# Patient Record
Sex: MALE | Race: WHITE | NOT HISPANIC OR LATINO | Employment: OTHER
[De-identification: names, ages, dates, MRNs, and addresses within clinical notes are randomized per-mention and may not be internally consistent; named-entity substitution may affect disease eponyms.]

---

## 2022-02-15 ENCOUNTER — HOME CARE VISIT (OUTPATIENT)
Dept: HOME HEALTH SERVICES | Facility: CLINIC | Age: 87
End: 2022-02-15

## 2022-02-15 ENCOUNTER — HOME HEALTH ADMISSION (OUTPATIENT)
Dept: HOME HEALTH SERVICES | Facility: HOME HEALTHCARE | Age: 87
End: 2022-02-15

## 2022-02-15 ENCOUNTER — TRANSCRIBE ORDERS (OUTPATIENT)
Dept: HOME HEALTH SERVICES | Facility: HOME HEALTHCARE | Age: 87
End: 2022-02-15

## 2022-02-15 DIAGNOSIS — J18.9 UNRESOLVED PNEUMONIA: ICD-10-CM

## 2022-02-15 DIAGNOSIS — J44.1 COPD EXACERBATION: Primary | ICD-10-CM

## 2022-02-15 PROCEDURE — G0151 HHCP-SERV OF PT,EA 15 MIN: HCPCS

## 2022-02-16 ENCOUNTER — HOME CARE VISIT (OUTPATIENT)
Dept: HOME HEALTH SERVICES | Facility: CLINIC | Age: 87
End: 2022-02-16

## 2022-02-16 VITALS
OXYGEN SATURATION: 96 % | SYSTOLIC BLOOD PRESSURE: 129 MMHG | RESPIRATION RATE: 18 BRPM | HEART RATE: 70 BPM | TEMPERATURE: 97.9 F | DIASTOLIC BLOOD PRESSURE: 78 MMHG

## 2022-02-16 PROCEDURE — G0299 HHS/HOSPICE OF RN EA 15 MIN: HCPCS

## 2022-02-16 NOTE — HOME HEALTH
REASON FOR REFERRAL:  Patient admitted to [facility] 2/9/2022-2/14/2022 with hypoxic respiratory failure due to COPD exacerbation and pneumonia.  He reports he feels fine and isn't sure he needs any help.  Patient's caregiver reports patient has gotten weaker and had several recent falls.  DIAGNOSIS: Weakness, abnormal gait  SURGICAL PROCEDURE: N/A  PERTINENT MEDICAL HISTORY: HTN, HLD, Carotid artery occlusion, Dementia, Black lung  PRIOR LEVEL OF FUNCTION: Ambulate all surfaces I with PRN mackenzie of std cane or quad cane  PATIENT GOAL FOR THIS EPISODE OF CARE: Get around like I used to  HOME SOCIAL ENVIRONMENT: Lives in 2 story home with spouse but does not have to access lower level

## 2022-02-17 VITALS
OXYGEN SATURATION: 94 % | TEMPERATURE: 97.2 F | DIASTOLIC BLOOD PRESSURE: 78 MMHG | HEART RATE: 63 BPM | SYSTOLIC BLOOD PRESSURE: 142 MMHG

## 2022-02-19 ENCOUNTER — HOME CARE VISIT (OUTPATIENT)
Dept: HOME HEALTH SERVICES | Facility: CLINIC | Age: 87
End: 2022-02-19

## 2022-02-19 VITALS
RESPIRATION RATE: 18 BRPM | SYSTOLIC BLOOD PRESSURE: 126 MMHG | TEMPERATURE: 97.1 F | DIASTOLIC BLOOD PRESSURE: 80 MMHG | HEART RATE: 64 BPM | OXYGEN SATURATION: 95 %

## 2022-02-19 PROCEDURE — G0299 HHS/HOSPICE OF RN EA 15 MIN: HCPCS

## 2022-02-24 ENCOUNTER — HOME CARE VISIT (OUTPATIENT)
Dept: HOME HEALTH SERVICES | Facility: CLINIC | Age: 87
End: 2022-02-24

## 2022-02-24 VITALS
TEMPERATURE: 96.8 F | DIASTOLIC BLOOD PRESSURE: 86 MMHG | RESPIRATION RATE: 18 BRPM | HEART RATE: 66 BPM | SYSTOLIC BLOOD PRESSURE: 142 MMHG | OXYGEN SATURATION: 94 %

## 2022-02-24 PROCEDURE — G0157 HHC PT ASSISTANT EA 15: HCPCS

## 2022-02-25 NOTE — HOME HEALTH
pt up amb. in home and reports had scheduled appointment in [place] this am, but cancelled due to bad weather; pt reports rescheduled for 3/10/22; pt reports continuing activities as able to but mostly limited by poor vision, and wants to lay around a lot because he can't get out and do what he used to

## 2022-03-02 ENCOUNTER — HOME CARE VISIT (OUTPATIENT)
Dept: HOME HEALTH SERVICES | Facility: CLINIC | Age: 87
End: 2022-03-02

## 2022-03-02 VITALS
SYSTOLIC BLOOD PRESSURE: 118 MMHG | TEMPERATURE: 96.6 F | HEART RATE: 68 BPM | RESPIRATION RATE: 18 BRPM | DIASTOLIC BLOOD PRESSURE: 82 MMHG | OXYGEN SATURATION: 94 %

## 2022-03-02 PROCEDURE — G0157 HHC PT ASSISTANT EA 15: HCPCS

## 2022-03-03 NOTE — HOME HEALTH
pt up amb. in home and reports doing pretty good; pts spouse reports pt attempted to do some yard work yesturday but couldn't get the small chain saw started; pt reports still trying to get out and stay as active as possible

## 2022-03-10 ENCOUNTER — HOME CARE VISIT (OUTPATIENT)
Dept: HOME HEALTH SERVICES | Facility: CLINIC | Age: 87
End: 2022-03-10

## 2022-03-10 PROCEDURE — G0151 HHCP-SERV OF PT,EA 15 MIN: HCPCS

## 2022-03-11 VITALS
TEMPERATURE: 97.4 F | OXYGEN SATURATION: 96 % | DIASTOLIC BLOOD PRESSURE: 59 MMHG | SYSTOLIC BLOOD PRESSURE: 106 MMHG | HEART RATE: 73 BPM | RESPIRATION RATE: 18 BRPM

## 2022-03-11 NOTE — HOME HEALTH
Patient reports he doesn't depend on his cane but uses it outside.  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  Patient reports completing his exercises 1-2 times a day and is up to 25 reps of each exercise. He had a follow up with the doctor today with no medication changes reported by patient's spouse; patient reports he does not plan to f/u with the pulmonologist when scheduled.  Patient reports he is getting in and out of the tub on his own and is doing everything he used to do.

## 2022-10-02 ENCOUNTER — APPOINTMENT (OUTPATIENT)
Dept: CT IMAGING | Facility: HOSPITAL | Age: 87
End: 2022-10-02

## 2022-10-02 ENCOUNTER — APPOINTMENT (OUTPATIENT)
Dept: GENERAL RADIOLOGY | Facility: HOSPITAL | Age: 87
End: 2022-10-02

## 2022-10-02 ENCOUNTER — HOSPITAL ENCOUNTER (EMERGENCY)
Facility: HOSPITAL | Age: 87
Discharge: HOME | End: 2022-10-02
Attending: STUDENT IN AN ORGANIZED HEALTH CARE EDUCATION/TRAINING PROGRAM | Admitting: STUDENT IN AN ORGANIZED HEALTH CARE EDUCATION/TRAINING PROGRAM

## 2022-10-02 VITALS
WEIGHT: 135 LBS | BODY MASS INDEX: 22.49 KG/M2 | TEMPERATURE: 98.6 F | OXYGEN SATURATION: 95 % | SYSTOLIC BLOOD PRESSURE: 195 MMHG | HEIGHT: 65 IN | DIASTOLIC BLOOD PRESSURE: 88 MMHG | RESPIRATION RATE: 18 BRPM | HEART RATE: 84 BPM

## 2022-10-02 DIAGNOSIS — R41.0 CONFUSION: Primary | ICD-10-CM

## 2022-10-02 PROCEDURE — 71045 X-RAY EXAM CHEST 1 VIEW: CPT

## 2022-10-02 PROCEDURE — 99283 EMERGENCY DEPT VISIT LOW MDM: CPT

## 2022-10-02 PROCEDURE — 70450 CT HEAD/BRAIN W/O DYE: CPT

## 2022-10-02 NOTE — ED PROVIDER NOTES
Subjective   History of Present Illness  Patient presents with wife with complaints of 3 days of intermittent confusion after falling off his porch and hitting his forehead on Wednesday.  He is not sure if he lost consciousness.  Patient has dementia and is on donezepil.  He has had upper respiratory symptoms and [facility] for some relief but was informed that he needed to come here to get a CAT scan.  He did not want to come to the hospital yesterday but presented today with increased cough and more confusion.  Wife has given him DM cough medicine over-the-counter and states that seems to make him more confused.        Review of Systems   Constitutional: Negative for activity change and appetite change.   HENT: Negative for congestion and ear pain.    Eyes: Negative for pain and discharge.   Respiratory: Positive for cough. Negative for chest tightness and shortness of breath.    Cardiovascular: Negative for chest pain and palpitations.   Gastrointestinal: Negative for abdominal distention and abdominal pain.   Endocrine: Negative for cold intolerance and heat intolerance.   Genitourinary: Negative for difficulty urinating and dysuria.   Musculoskeletal: Negative for arthralgias and back pain.   Skin: Negative for color change and rash.   Allergic/Immunologic: Negative for environmental allergies and food allergies.   Neurological: Negative for dizziness and headaches.   Hematological: Negative for adenopathy. Does not bruise/bleed easily.   Psychiatric/Behavioral: Positive for confusion. Negative for agitation.       No past medical history on file.    Allergies   Allergen Reactions   • Morphine Unknown - High Severity   • Tramadol Unknown - High Severity       No past surgical history on file.    No family history on file.    Social History     Socioeconomic History   • Marital status:            Objective   Physical Exam  Vitals and nursing note reviewed.   Constitutional:        "Appearance: He is well-developed.   HENT:      Head: Normocephalic and atraumatic.   Eyes:      Pupils: Pupils are equal, round, and reactive to light.   Neck:      Thyroid: No thyromegaly.      Trachea: No tracheal deviation.   Cardiovascular:      Rate and Rhythm: Normal rate.      Pulses:           Radial pulses are 2+ on the left side.        Dorsalis pedis pulses are 2+ on the right side and 2+ on the left side.      Heart sounds: Normal heart sounds, S1 normal and S2 normal.   Pulmonary:      Effort: Pulmonary effort is normal.      Breath sounds: Normal breath sounds.   Abdominal:      Palpations: Abdomen is soft.   Musculoskeletal:         General: Normal range of motion.      Cervical back: Neck supple.   Skin:     General: Skin is warm and dry.      Capillary Refill: Capillary refill takes 2 to 3 seconds.   Neurological:      Mental Status: He is alert and oriented to person, place, and time.      GCS: GCS eye subscore is 4. GCS verbal subscore is 4. GCS motor subscore is 6.      Comments: Patient has a history of dementia and is not always oriented.  Wife endorses baseline mentation at time of exam.     Psychiatric:         Speech: Speech normal.         Behavior: Behavior normal.         Thought Content: Thought content normal.         Procedures           ED Course  ED Course as of 10/02/22 2213   Sun Oct 02, 2022   1345 Patient is refusing blood draw or saline lock. [TERESITA]      ED Course User Index  [TERESITA] Rocío Gary MD           Vitals:    10/02/22 1233 10/02/22 1341 10/02/22 1504   BP: 146/83 (!) 189/89 (!) 195/88   BP Location: Left arm Left arm Left arm   Patient Position: Sitting Sitting Sitting   Pulse: 95 81 84   Resp: 17 18 18   Temp: 98.6 °F (37 °C)     TempSrc: Oral     SpO2: 93% 93% 95%   Weight: 61.2 kg (135 lb)     Height: 165.1 cm (65\")       Lab Results (last 24 hours)     ** No results found for the last 24 hours. **        CT Head Without Contrast    Result Date: " 10/2/2022  CONCLUSION: No intracranial injury or acute process. Cerebral atrophy. Marked small vessel disease. [id number] Electronically signed by:  Daniel Ruelas MD  10/2/2022 1:37 PM CDT Workstation: [id number]    XR Chest 1 View    Result Date: 10/2/2022  CONCLUSION: Previously demonstrated large right-sided diaphragmatic hernia. 10 cm hiatal hernia. [id number] Electronically signed by:  Daniel Ruelas MD  10/2/2022 2:05 PM CDT Workstation: [id number]                                    MDM  Number of Diagnoses or Management Options  Confusion  Diagnosis management comments: Patient with increased confusion after being given cold medicine.  Patient refused labs or urine.  CT head negative for acute findings.  Discussed with wife other options for controlling his upper respiratory infection symptoms and alternatives to coughing such as honey, increase fluids, or plain guaifenesin.  Close follow-up with PCP tomorrow.  Vital signs were stable throughout stay.       Amount and/or Complexity of Data Reviewed  Tests in the radiology section of CPT®: ordered and reviewed    Patient Progress  Patient progress: stable      Final diagnoses:   Confusion       ED Disposition  ED Disposition     ED Disposition   Discharge    Condition   Stable    Comment   --             Janina Motley, APRN  [address] [id number] [phone]    Call in 1 day  for follow up         Medication List      No changes were made to your prescriptions during this visit.       This document has been electronically signed by Rocío Gary MD on October 2, 2022 22:15 CDT    Rocío Gary MD   Part of this note may be an electronic transcription/translation of spoken language to printed text using the Dragon Dictation System.        Rocío Gary MD  10/02/22 2215       Rocío Gary MD  10/02/22 2216

## 2022-10-02 NOTE — DISCHARGE INSTRUCTIONS
Call your PCP tomorrow for follow-up.  Take all your regularly scheduled medications.  Do not use over-the-counter decongestants and use only nasal saline washes, plain guaifenesin for cough, honey for cough and sore throat, and increase fluids.

## 2022-10-09 ENCOUNTER — HOSPITAL ENCOUNTER (INPATIENT)
Facility: HOSPITAL | Age: 87
LOS: 10 days | Discharge: HOME | End: 2022-10-19
Attending: PSYCHIATRY & NEUROLOGY | Admitting: PSYCHIATRY & NEUROLOGY

## 2022-10-09 ENCOUNTER — APPOINTMENT (OUTPATIENT)
Dept: CT IMAGING | Facility: HOSPITAL | Age: 87
End: 2022-10-09

## 2022-10-09 ENCOUNTER — HOSPITAL ENCOUNTER (EMERGENCY)
Facility: HOSPITAL | Age: 87
Discharge: PSYCHIATRIC OR SUBSTANCE USE FACILITY | End: 2022-10-09
Attending: EMERGENCY MEDICINE | Admitting: EMERGENCY MEDICINE

## 2022-10-09 VITALS
OXYGEN SATURATION: 96 % | HEART RATE: 78 BPM | HEIGHT: 65 IN | WEIGHT: 135 LBS | SYSTOLIC BLOOD PRESSURE: 180 MMHG | RESPIRATION RATE: 18 BRPM | TEMPERATURE: 98.1 F | BODY MASS INDEX: 22.49 KG/M2 | DIASTOLIC BLOOD PRESSURE: 92 MMHG

## 2022-10-09 DIAGNOSIS — Z74.09 IMPAIRED MOBILITY AND ADLS: ICD-10-CM

## 2022-10-09 DIAGNOSIS — R44.3 HALLUCINATION: Primary | ICD-10-CM

## 2022-10-09 DIAGNOSIS — F03.918 DEMENTIA WITH BEHAVIORAL DISTURBANCE: ICD-10-CM

## 2022-10-09 DIAGNOSIS — R26.89 DECREASED FUNCTIONAL MOBILITY: ICD-10-CM

## 2022-10-09 DIAGNOSIS — Z78.9 IMPAIRED MOBILITY AND ADLS: ICD-10-CM

## 2022-10-09 PROBLEM — F03.90 DEMENTIA: Status: ACTIVE | Noted: 2022-01-01

## 2022-10-09 LAB
ALBUMIN SERPL-MCNC: 4.2 G/DL (ref 3.5–5.2)
ALBUMIN/GLOB SERPL: 1.4 G/DL
ALP SERPL-CCNC: 136 U/L (ref 39–117)
ALT SERPL W P-5'-P-CCNC: 10 U/L (ref 1–41)
AMPHET+METHAMPHET UR QL: NEGATIVE
AMPHETAMINES UR QL: NEGATIVE
ANION GAP SERPL CALCULATED.3IONS-SCNC: 13 MMOL/L (ref 5–15)
APAP SERPL-MCNC: <5 MCG/ML (ref 0–30)
AST SERPL-CCNC: 15 U/L (ref 1–40)
BARBITURATES UR QL SCN: NEGATIVE
BASOPHILS # BLD AUTO: 0.05 10*3/MM3 (ref 0–0.2)
BASOPHILS NFR BLD AUTO: 0.5 % (ref 0–1.5)
BENZODIAZ UR QL SCN: NEGATIVE
BILIRUB SERPL-MCNC: 0.5 MG/DL (ref 0–1.2)
BILIRUB UR QL STRIP: NEGATIVE
BUN SERPL-MCNC: 16 MG/DL (ref 8–23)
BUN/CREAT SERPL: 13.3 (ref 7–25)
BUPRENORPHINE SERPL-MCNC: NEGATIVE NG/ML
CALCIUM SPEC-SCNC: 9.6 MG/DL (ref 8.6–10.5)
CANNABINOIDS SERPL QL: NEGATIVE
CHLORIDE SERPL-SCNC: 107 MMOL/L (ref 98–107)
CLARITY UR: CLEAR
CO2 SERPL-SCNC: 22 MMOL/L (ref 22–29)
COCAINE UR QL: NEGATIVE
COLOR UR: YELLOW
CREAT SERPL-MCNC: 1.2 MG/DL (ref 0.76–1.27)
DEPRECATED RDW RBC AUTO: 42.6 FL (ref 37–54)
EGFRCR SERPLBLD CKD-EPI 2021: 58.9 ML/MIN/1.73
EOSINOPHIL # BLD AUTO: 0.42 10*3/MM3 (ref 0–0.4)
EOSINOPHIL NFR BLD AUTO: 4.4 % (ref 0.3–6.2)
ERYTHROCYTE [DISTWIDTH] IN BLOOD BY AUTOMATED COUNT: 12.3 % (ref 12.3–15.4)
ETHANOL BLD-MCNC: <10 MG/DL (ref 0–10)
ETHANOL UR QL: <0.01 %
FLUAV RNA RESP QL NAA+PROBE: NOT DETECTED
FLUBV RNA RESP QL NAA+PROBE: NOT DETECTED
GLOBULIN UR ELPH-MCNC: 2.9 GM/DL
GLUCOSE SERPL-MCNC: 108 MG/DL (ref 65–99)
GLUCOSE UR STRIP-MCNC: NEGATIVE MG/DL
HCT VFR BLD AUTO: 45.1 % (ref 37.5–51)
HGB BLD-MCNC: 15.7 G/DL (ref 13–17.7)
HGB UR QL STRIP.AUTO: NEGATIVE
HOLD SPECIMEN: NORMAL
IMM GRANULOCYTES # BLD AUTO: 0.07 10*3/MM3 (ref 0–0.05)
IMM GRANULOCYTES NFR BLD AUTO: 0.7 % (ref 0–0.5)
KETONES UR QL STRIP: NEGATIVE
LEUKOCYTE ESTERASE UR QL STRIP.AUTO: NEGATIVE
LYMPHOCYTES # BLD AUTO: 2.08 10*3/MM3 (ref 0.7–3.1)
LYMPHOCYTES NFR BLD AUTO: 21.8 % (ref 19.6–45.3)
MCH RBC QN AUTO: 32.8 PG (ref 26.6–33)
MCHC RBC AUTO-ENTMCNC: 34.8 G/DL (ref 31.5–35.7)
MCV RBC AUTO: 94.2 FL (ref 79–97)
METHADONE UR QL SCN: NEGATIVE
MONOCYTES # BLD AUTO: 0.73 10*3/MM3 (ref 0.1–0.9)
MONOCYTES NFR BLD AUTO: 7.7 % (ref 5–12)
NEUTROPHILS NFR BLD AUTO: 6.18 10*3/MM3 (ref 1.7–7)
NEUTROPHILS NFR BLD AUTO: 64.9 % (ref 42.7–76)
NITRITE UR QL STRIP: NEGATIVE
NRBC BLD AUTO-RTO: 0 /100 WBC (ref 0–0.2)
OPIATES UR QL: NEGATIVE
OXYCODONE UR QL SCN: NEGATIVE
PCP UR QL SCN: NEGATIVE
PH UR STRIP.AUTO: 6 [PH] (ref 5–9)
PLATELET # BLD AUTO: 222 10*3/MM3 (ref 140–450)
PMV BLD AUTO: 9.7 FL (ref 6–12)
POTASSIUM SERPL-SCNC: 3.7 MMOL/L (ref 3.5–5.2)
PROPOXYPH UR QL: NEGATIVE
PROT SERPL-MCNC: 7.1 G/DL (ref 6–8.5)
PROT UR QL STRIP: ABNORMAL
RBC # BLD AUTO: 4.79 10*6/MM3 (ref 4.14–5.8)
SALICYLATES SERPL-MCNC: <0.3 MG/DL
SARS-COV-2 RNA RESP QL NAA+PROBE: NOT DETECTED
SODIUM SERPL-SCNC: 142 MMOL/L (ref 136–145)
SP GR UR STRIP: 1.02 (ref 1–1.03)
TRICYCLICS UR QL SCN: NEGATIVE
URATE SERPL-MCNC: 6.3 MG/DL (ref 3.4–7)
UROBILINOGEN UR QL STRIP: ABNORMAL
WBC NRBC COR # BLD: 9.53 10*3/MM3 (ref 3.4–10.8)
WHOLE BLOOD HOLD COAG: NORMAL

## 2022-10-09 PROCEDURE — 80053 COMPREHEN METABOLIC PANEL: CPT | Performed by: EMERGENCY MEDICINE

## 2022-10-09 PROCEDURE — 36415 COLL VENOUS BLD VENIPUNCTURE: CPT

## 2022-10-09 PROCEDURE — 72125 CT NECK SPINE W/O DYE: CPT

## 2022-10-09 PROCEDURE — 99284 EMERGENCY DEPT VISIT MOD MDM: CPT

## 2022-10-09 PROCEDURE — 87636 SARSCOV2 & INF A&B AMP PRB: CPT | Performed by: EMERGENCY MEDICINE

## 2022-10-09 PROCEDURE — 85025 COMPLETE CBC W/AUTO DIFF WBC: CPT | Performed by: EMERGENCY MEDICINE

## 2022-10-09 PROCEDURE — 82077 ASSAY SPEC XCP UR&BREATH IA: CPT | Performed by: EMERGENCY MEDICINE

## 2022-10-09 PROCEDURE — 80143 DRUG ASSAY ACETAMINOPHEN: CPT | Performed by: EMERGENCY MEDICINE

## 2022-10-09 PROCEDURE — 80306 DRUG TEST PRSMV INSTRMNT: CPT | Performed by: EMERGENCY MEDICINE

## 2022-10-09 PROCEDURE — 84550 ASSAY OF BLOOD/URIC ACID: CPT | Performed by: EMERGENCY MEDICINE

## 2022-10-09 PROCEDURE — 70450 CT HEAD/BRAIN W/O DYE: CPT

## 2022-10-09 PROCEDURE — 99285 EMERGENCY DEPT VISIT HI MDM: CPT

## 2022-10-09 PROCEDURE — 80179 DRUG ASSAY SALICYLATE: CPT | Performed by: EMERGENCY MEDICINE

## 2022-10-09 PROCEDURE — 81003 URINALYSIS AUTO W/O SCOPE: CPT | Performed by: EMERGENCY MEDICINE

## 2022-10-09 RX ORDER — CLONIDINE HYDROCHLORIDE 0.1 MG/1
0.1 TABLET ORAL 4 TIMES DAILY PRN
Status: DISCONTINUED | OUTPATIENT
Start: 2022-10-09 | End: 2022-10-19 | Stop reason: HOSPADM

## 2022-10-09 RX ORDER — CLONIDINE HYDROCHLORIDE 0.1 MG/1
0.1 TABLET ORAL 4 TIMES DAILY PRN
Status: DISCONTINUED | OUTPATIENT
Start: 2022-10-09 | End: 2022-10-09

## 2022-10-09 RX ORDER — ACETAMINOPHEN 325 MG/1
650 TABLET ORAL EVERY 4 HOURS PRN
Status: DISCONTINUED | OUTPATIENT
Start: 2022-10-09 | End: 2022-10-19 | Stop reason: HOSPADM

## 2022-10-09 RX ORDER — LOPERAMIDE HYDROCHLORIDE 2 MG/1
2 CAPSULE ORAL
Status: DISCONTINUED | OUTPATIENT
Start: 2022-10-09 | End: 2022-10-19 | Stop reason: HOSPADM

## 2022-10-09 RX ORDER — ALUMINA, MAGNESIA, AND SIMETHICONE 2400; 2400; 240 MG/30ML; MG/30ML; MG/30ML
15 SUSPENSION ORAL EVERY 6 HOURS PRN
Status: DISCONTINUED | OUTPATIENT
Start: 2022-10-09 | End: 2022-10-19 | Stop reason: HOSPADM

## 2022-10-10 PROBLEM — F02.80 MAJOR NEUROCOGNITIVE DISORDER DUE TO MULTIPLE ETIOLOGIES: Status: ACTIVE | Noted: 2022-10-10

## 2022-10-10 PROBLEM — E78.5 HLD (HYPERLIPIDEMIA): Status: ACTIVE | Noted: 2022-01-01

## 2022-10-10 PROBLEM — I67.9 CEREBROVASCULAR DISEASE: Status: ACTIVE | Noted: 2022-10-10

## 2022-10-10 PROBLEM — I10 BENIGN ESSENTIAL HTN: Status: ACTIVE | Noted: 2022-10-10

## 2022-10-10 PROBLEM — I25.10 CAD (CORONARY ARTERY DISEASE): Status: ACTIVE | Noted: 2022-10-10

## 2022-10-10 PROBLEM — J44.9 COPD (CHRONIC OBSTRUCTIVE PULMONARY DISEASE): Status: ACTIVE | Noted: 2022-10-10

## 2022-10-10 PROCEDURE — 97162 PT EVAL MOD COMPLEX 30 MIN: CPT

## 2022-10-10 PROCEDURE — 94640 AIRWAY INHALATION TREATMENT: CPT

## 2022-10-10 PROCEDURE — 97165 OT EVAL LOW COMPLEX 30 MIN: CPT

## 2022-10-10 PROCEDURE — 94760 N-INVAS EAR/PLS OXIMETRY 1: CPT

## 2022-10-10 PROCEDURE — 99223 1ST HOSP IP/OBS HIGH 75: CPT | Performed by: PSYCHIATRY & NEUROLOGY

## 2022-10-10 RX ORDER — TORSEMIDE 10 MG/1
10 TABLET ORAL DAILY
Status: DISCONTINUED | OUTPATIENT
Start: 2022-10-10 | End: 2022-10-19 | Stop reason: HOSPADM

## 2022-10-10 RX ORDER — ATORVASTATIN CALCIUM 40 MG/1
40 TABLET, FILM COATED ORAL NIGHTLY
Status: DISCONTINUED | OUTPATIENT
Start: 2022-10-10 | End: 2022-10-19 | Stop reason: HOSPADM

## 2022-10-10 RX ORDER — MULTIPLE VITAMINS W/ MINERALS TAB 9MG-400MCG
1 TAB ORAL DAILY
Status: DISCONTINUED | OUTPATIENT
Start: 2022-10-10 | End: 2022-10-19 | Stop reason: HOSPADM

## 2022-10-10 RX ORDER — NITROGLYCERIN 0.4 MG/1
0.4 TABLET SUBLINGUAL
Status: DISCONTINUED | OUTPATIENT
Start: 2022-10-10 | End: 2022-10-19 | Stop reason: HOSPADM

## 2022-10-10 RX ORDER — RIVASTIGMINE 4.6 MG/24H
1 PATCH, EXTENDED RELEASE TRANSDERMAL DAILY
Status: DISCONTINUED | OUTPATIENT
Start: 2022-10-10 | End: 2022-10-19 | Stop reason: HOSPADM

## 2022-10-10 RX ORDER — ASPIRIN 81 MG/1
81 TABLET, CHEWABLE ORAL DAILY
Status: DISCONTINUED | OUTPATIENT
Start: 2022-10-10 | End: 2022-10-19 | Stop reason: HOSPADM

## 2022-10-10 RX ORDER — BUDESONIDE AND FORMOTEROL FUMARATE DIHYDRATE 160; 4.5 UG/1; UG/1
2 AEROSOL RESPIRATORY (INHALATION)
Status: DISCONTINUED | OUTPATIENT
Start: 2022-10-10 | End: 2022-10-19 | Stop reason: HOSPADM

## 2022-10-10 RX ORDER — ALBUTEROL SULFATE 2.5 MG/3ML
2.5 SOLUTION RESPIRATORY (INHALATION) EVERY 6 HOURS PRN
Status: DISCONTINUED | OUTPATIENT
Start: 2022-10-10 | End: 2022-10-19 | Stop reason: HOSPADM

## 2022-10-10 RX ORDER — SPIRONOLACTONE 25 MG/1
25 TABLET ORAL DAILY
Status: DISCONTINUED | OUTPATIENT
Start: 2022-10-10 | End: 2022-10-19 | Stop reason: HOSPADM

## 2022-10-10 RX ADMIN — ASPIRIN 81 MG: 81 TABLET, CHEWABLE ORAL at 08:53

## 2022-10-10 RX ADMIN — BUDESONIDE AND FORMOTEROL FUMARATE DIHYDRATE 2 PUFF: 160; 4.5 AEROSOL RESPIRATORY (INHALATION) at 10:29

## 2022-10-10 RX ADMIN — Medication 1 TABLET: at 08:53

## 2022-10-10 RX ADMIN — TORSEMIDE 10 MG: 10 TABLET ORAL at 08:53

## 2022-10-10 RX ADMIN — ATORVASTATIN CALCIUM 40 MG: 40 TABLET, FILM COATED ORAL at 20:27

## 2022-10-10 RX ADMIN — SPIRONOLACTONE 25 MG: 25 TABLET ORAL at 08:53

## 2022-10-10 RX ADMIN — RIVASTIGMINE TRANSDERMAL SYSTEM 1 PATCH: 4.6 PATCH, EXTENDED RELEASE TRANSDERMAL at 17:23

## 2022-10-11 LAB
25(OH)D3 SERPL-MCNC: 37 NG/ML (ref 30–100)
FOLATE SERPL-MCNC: 6.59 NG/ML (ref 4.78–24.2)
HIV1+2 AB SER QL: NORMAL
QT INTERVAL: 434 MS
QTC INTERVAL: 519 MS
TSH SERPL DL<=0.05 MIU/L-ACNC: 2.4 UIU/ML (ref 0.27–4.2)
VIT B12 BLD-MCNC: 522 PG/ML (ref 211–946)

## 2022-10-11 PROCEDURE — 84443 ASSAY THYROID STIM HORMONE: CPT | Performed by: PSYCHIATRY & NEUROLOGY

## 2022-10-11 PROCEDURE — 94799 UNLISTED PULMONARY SVC/PX: CPT

## 2022-10-11 PROCEDURE — 82607 VITAMIN B-12: CPT | Performed by: PSYCHIATRY & NEUROLOGY

## 2022-10-11 PROCEDURE — 99232 SBSQ HOSP IP/OBS MODERATE 35: CPT | Performed by: PSYCHIATRY & NEUROLOGY

## 2022-10-11 PROCEDURE — 82306 VITAMIN D 25 HYDROXY: CPT | Performed by: PSYCHIATRY & NEUROLOGY

## 2022-10-11 PROCEDURE — 82746 ASSAY OF FOLIC ACID SERUM: CPT | Performed by: PSYCHIATRY & NEUROLOGY

## 2022-10-11 PROCEDURE — G0432 EIA HIV-1/HIV-2 SCREEN: HCPCS | Performed by: PSYCHIATRY & NEUROLOGY

## 2022-10-11 PROCEDURE — 94664 DEMO&/EVAL PT USE INHALER: CPT

## 2022-10-11 PROCEDURE — 94760 N-INVAS EAR/PLS OXIMETRY 1: CPT

## 2022-10-11 PROCEDURE — 93005 ELECTROCARDIOGRAM TRACING: CPT | Performed by: PSYCHIATRY & NEUROLOGY

## 2022-10-11 RX ADMIN — Medication 1 TABLET: at 08:58

## 2022-10-11 RX ADMIN — SPIRONOLACTONE 25 MG: 25 TABLET ORAL at 08:58

## 2022-10-11 RX ADMIN — TORSEMIDE 10 MG: 10 TABLET ORAL at 08:58

## 2022-10-11 RX ADMIN — RIVASTIGMINE TRANSDERMAL SYSTEM 1 PATCH: 4.6 PATCH, EXTENDED RELEASE TRANSDERMAL at 08:59

## 2022-10-11 RX ADMIN — ATORVASTATIN CALCIUM 40 MG: 40 TABLET, FILM COATED ORAL at 20:03

## 2022-10-11 RX ADMIN — BUDESONIDE AND FORMOTEROL FUMARATE DIHYDRATE 2 PUFF: 160; 4.5 AEROSOL RESPIRATORY (INHALATION) at 20:14

## 2022-10-11 RX ADMIN — BUDESONIDE AND FORMOTEROL FUMARATE DIHYDRATE 2 PUFF: 160; 4.5 AEROSOL RESPIRATORY (INHALATION) at 08:48

## 2022-10-11 RX ADMIN — ASPIRIN 81 MG: 81 TABLET, CHEWABLE ORAL at 08:58

## 2022-10-12 PROCEDURE — 99232 SBSQ HOSP IP/OBS MODERATE 35: CPT | Performed by: PSYCHIATRY & NEUROLOGY

## 2022-10-12 PROCEDURE — 94799 UNLISTED PULMONARY SVC/PX: CPT

## 2022-10-12 RX ORDER — ESCITALOPRAM OXALATE 5 MG/1
2.5 TABLET ORAL DAILY
Status: DISCONTINUED | OUTPATIENT
Start: 2022-10-12 | End: 2022-10-13

## 2022-10-12 RX ADMIN — ESCITALOPRAM 2.5 MG: 5 TABLET, FILM COATED ORAL at 15:41

## 2022-10-12 RX ADMIN — ATORVASTATIN CALCIUM 40 MG: 40 TABLET, FILM COATED ORAL at 20:17

## 2022-10-12 RX ADMIN — SPIRONOLACTONE 25 MG: 25 TABLET ORAL at 08:14

## 2022-10-12 RX ADMIN — Medication 1 TABLET: at 08:16

## 2022-10-12 RX ADMIN — TORSEMIDE 10 MG: 10 TABLET ORAL at 08:14

## 2022-10-12 RX ADMIN — BUDESONIDE AND FORMOTEROL FUMARATE DIHYDRATE 2 PUFF: 160; 4.5 AEROSOL RESPIRATORY (INHALATION) at 07:30

## 2022-10-12 RX ADMIN — RIVASTIGMINE TRANSDERMAL SYSTEM 1 PATCH: 4.6 PATCH, EXTENDED RELEASE TRANSDERMAL at 08:15

## 2022-10-12 RX ADMIN — ASPIRIN 81 MG: 81 TABLET, CHEWABLE ORAL at 08:14

## 2022-10-13 PROCEDURE — 99231 SBSQ HOSP IP/OBS SF/LOW 25: CPT | Performed by: PSYCHIATRY & NEUROLOGY

## 2022-10-13 PROCEDURE — 94799 UNLISTED PULMONARY SVC/PX: CPT

## 2022-10-13 RX ORDER — ESCITALOPRAM OXALATE 5 MG/1
5 TABLET ORAL DAILY
Status: DISCONTINUED | OUTPATIENT
Start: 2022-10-14 | End: 2022-10-19 | Stop reason: HOSPADM

## 2022-10-13 RX ADMIN — ASPIRIN 81 MG: 81 TABLET, CHEWABLE ORAL at 08:41

## 2022-10-13 RX ADMIN — ATORVASTATIN CALCIUM 40 MG: 40 TABLET, FILM COATED ORAL at 20:12

## 2022-10-13 RX ADMIN — Medication 1 TABLET: at 08:41

## 2022-10-13 RX ADMIN — RIVASTIGMINE TRANSDERMAL SYSTEM 1 PATCH: 4.6 PATCH, EXTENDED RELEASE TRANSDERMAL at 08:41

## 2022-10-13 RX ADMIN — BUDESONIDE AND FORMOTEROL FUMARATE DIHYDRATE 2 PUFF: 160; 4.5 AEROSOL RESPIRATORY (INHALATION) at 07:59

## 2022-10-13 RX ADMIN — TORSEMIDE 10 MG: 10 TABLET ORAL at 08:41

## 2022-10-13 RX ADMIN — BUDESONIDE AND FORMOTEROL FUMARATE DIHYDRATE 2 PUFF: 160; 4.5 AEROSOL RESPIRATORY (INHALATION) at 20:22

## 2022-10-13 RX ADMIN — SPIRONOLACTONE 25 MG: 25 TABLET ORAL at 08:41

## 2022-10-13 RX ADMIN — ESCITALOPRAM 2.5 MG: 5 TABLET, FILM COATED ORAL at 08:41

## 2022-10-14 PROCEDURE — 99231 SBSQ HOSP IP/OBS SF/LOW 25: CPT | Performed by: NURSE PRACTITIONER

## 2022-10-14 RX ADMIN — RIVASTIGMINE TRANSDERMAL SYSTEM 1 PATCH: 4.6 PATCH, EXTENDED RELEASE TRANSDERMAL at 08:53

## 2022-10-14 RX ADMIN — ASPIRIN 81 MG: 81 TABLET, CHEWABLE ORAL at 08:53

## 2022-10-14 RX ADMIN — TORSEMIDE 10 MG: 10 TABLET ORAL at 08:53

## 2022-10-14 RX ADMIN — SPIRONOLACTONE 25 MG: 25 TABLET ORAL at 08:53

## 2022-10-14 RX ADMIN — Medication 1 TABLET: at 08:53

## 2022-10-14 RX ADMIN — ATORVASTATIN CALCIUM 40 MG: 40 TABLET, FILM COATED ORAL at 21:03

## 2022-10-14 RX ADMIN — ESCITALOPRAM 5 MG: 5 TABLET, FILM COATED ORAL at 08:53

## 2022-10-15 PROCEDURE — 99232 SBSQ HOSP IP/OBS MODERATE 35: CPT | Performed by: PSYCHIATRY & NEUROLOGY

## 2022-10-15 RX ADMIN — TORSEMIDE 10 MG: 10 TABLET ORAL at 08:37

## 2022-10-15 RX ADMIN — ESCITALOPRAM 5 MG: 5 TABLET, FILM COATED ORAL at 08:37

## 2022-10-15 RX ADMIN — Medication 1 TABLET: at 08:37

## 2022-10-15 RX ADMIN — ATORVASTATIN CALCIUM 40 MG: 40 TABLET, FILM COATED ORAL at 21:26

## 2022-10-15 RX ADMIN — SPIRONOLACTONE 25 MG: 25 TABLET ORAL at 08:37

## 2022-10-15 RX ADMIN — RIVASTIGMINE TRANSDERMAL SYSTEM 1 PATCH: 4.6 PATCH, EXTENDED RELEASE TRANSDERMAL at 08:37

## 2022-10-15 RX ADMIN — ASPIRIN 81 MG: 81 TABLET, CHEWABLE ORAL at 08:37

## 2022-10-16 PROCEDURE — 94799 UNLISTED PULMONARY SVC/PX: CPT

## 2022-10-16 PROCEDURE — 94664 DEMO&/EVAL PT USE INHALER: CPT

## 2022-10-16 PROCEDURE — 94760 N-INVAS EAR/PLS OXIMETRY 1: CPT

## 2022-10-16 PROCEDURE — 99232 SBSQ HOSP IP/OBS MODERATE 35: CPT | Performed by: PSYCHIATRY & NEUROLOGY

## 2022-10-16 RX ADMIN — ATORVASTATIN CALCIUM 40 MG: 40 TABLET, FILM COATED ORAL at 21:49

## 2022-10-16 RX ADMIN — SPIRONOLACTONE 25 MG: 25 TABLET ORAL at 08:35

## 2022-10-16 RX ADMIN — ESCITALOPRAM 5 MG: 5 TABLET, FILM COATED ORAL at 08:35

## 2022-10-16 RX ADMIN — TORSEMIDE 10 MG: 10 TABLET ORAL at 08:35

## 2022-10-16 RX ADMIN — Medication 1 TABLET: at 08:35

## 2022-10-16 RX ADMIN — ASPIRIN 81 MG: 81 TABLET, CHEWABLE ORAL at 08:35

## 2022-10-16 RX ADMIN — BUDESONIDE AND FORMOTEROL FUMARATE DIHYDRATE 2 PUFF: 160; 4.5 AEROSOL RESPIRATORY (INHALATION) at 07:45

## 2022-10-16 RX ADMIN — RIVASTIGMINE TRANSDERMAL SYSTEM 1 PATCH: 4.6 PATCH, EXTENDED RELEASE TRANSDERMAL at 08:35

## 2022-10-17 PROCEDURE — 99232 SBSQ HOSP IP/OBS MODERATE 35: CPT | Performed by: PSYCHIATRY & NEUROLOGY

## 2022-10-17 RX ADMIN — ASPIRIN 81 MG: 81 TABLET, CHEWABLE ORAL at 08:26

## 2022-10-17 RX ADMIN — TORSEMIDE 10 MG: 10 TABLET ORAL at 08:27

## 2022-10-17 RX ADMIN — SPIRONOLACTONE 25 MG: 25 TABLET ORAL at 08:27

## 2022-10-17 RX ADMIN — RIVASTIGMINE TRANSDERMAL SYSTEM 1 PATCH: 4.6 PATCH, EXTENDED RELEASE TRANSDERMAL at 08:29

## 2022-10-17 RX ADMIN — ATORVASTATIN CALCIUM 40 MG: 40 TABLET, FILM COATED ORAL at 21:05

## 2022-10-17 RX ADMIN — Medication 1 TABLET: at 08:26

## 2022-10-17 RX ADMIN — ESCITALOPRAM 5 MG: 5 TABLET, FILM COATED ORAL at 08:26

## 2022-10-18 PROCEDURE — 99232 SBSQ HOSP IP/OBS MODERATE 35: CPT | Performed by: PSYCHIATRY & NEUROLOGY

## 2022-10-18 RX ADMIN — SPIRONOLACTONE 25 MG: 25 TABLET ORAL at 08:25

## 2022-10-18 RX ADMIN — ASPIRIN 81 MG: 81 TABLET, CHEWABLE ORAL at 08:25

## 2022-10-18 RX ADMIN — Medication 1 TABLET: at 08:25

## 2022-10-18 RX ADMIN — ESCITALOPRAM 5 MG: 5 TABLET, FILM COATED ORAL at 08:25

## 2022-10-18 RX ADMIN — TORSEMIDE 10 MG: 10 TABLET ORAL at 08:25

## 2022-10-18 RX ADMIN — RIVASTIGMINE TRANSDERMAL SYSTEM 1 PATCH: 4.6 PATCH, EXTENDED RELEASE TRANSDERMAL at 08:25

## 2022-10-18 RX ADMIN — ATORVASTATIN CALCIUM 40 MG: 40 TABLET, FILM COATED ORAL at 20:16

## 2022-10-19 VITALS
TEMPERATURE: 96.3 F | HEART RATE: 82 BPM | BODY MASS INDEX: 22.49 KG/M2 | DIASTOLIC BLOOD PRESSURE: 57 MMHG | WEIGHT: 135 LBS | HEIGHT: 65 IN | SYSTOLIC BLOOD PRESSURE: 98 MMHG | RESPIRATION RATE: 16 BRPM | OXYGEN SATURATION: 97 %

## 2022-10-19 PROCEDURE — 99238 HOSP IP/OBS DSCHRG MGMT 30/<: CPT | Performed by: PSYCHIATRY & NEUROLOGY

## 2022-10-19 PROCEDURE — 90846 FAMILY PSYTX W/O PT 50 MIN: CPT | Performed by: PSYCHIATRY & NEUROLOGY

## 2022-10-19 RX ORDER — RIVASTIGMINE 4.6 MG/24H
1 PATCH, EXTENDED RELEASE TRANSDERMAL DAILY
Qty: 30 PATCH | Refills: 1 | Status: SHIPPED | OUTPATIENT
Start: 2022-10-20

## 2022-10-19 RX ORDER — ESCITALOPRAM OXALATE 5 MG/1
5 TABLET ORAL DAILY
Qty: 30 TABLET | Refills: 1 | Status: SHIPPED | OUTPATIENT
Start: 2022-10-20

## 2022-10-19 RX ADMIN — TORSEMIDE 10 MG: 10 TABLET ORAL at 08:11

## 2022-10-19 RX ADMIN — RIVASTIGMINE TRANSDERMAL SYSTEM 1 PATCH: 4.6 PATCH, EXTENDED RELEASE TRANSDERMAL at 08:10

## 2022-10-19 RX ADMIN — ASPIRIN 81 MG: 81 TABLET, CHEWABLE ORAL at 08:11

## 2022-10-19 RX ADMIN — Medication 1 TABLET: at 08:11

## 2022-10-19 RX ADMIN — SPIRONOLACTONE 25 MG: 25 TABLET ORAL at 08:11

## 2022-10-19 RX ADMIN — ESCITALOPRAM 5 MG: 5 TABLET, FILM COATED ORAL at 08:11

## 2022-12-01 ENCOUNTER — APPOINTMENT (OUTPATIENT)
Dept: GENERAL RADIOLOGY | Facility: HOSPITAL | Age: 87
End: 2022-12-01

## 2022-12-01 ENCOUNTER — APPOINTMENT (OUTPATIENT)
Dept: CT IMAGING | Facility: HOSPITAL | Age: 87
End: 2022-12-01

## 2022-12-01 ENCOUNTER — HOSPITAL ENCOUNTER (INPATIENT)
Facility: HOSPITAL | Age: 87
LOS: 7 days | Discharge: NURSING, REHAB OR LONG-TERM CARE | End: 2022-12-09
Attending: EMERGENCY MEDICINE | Admitting: FAMILY MEDICINE

## 2022-12-01 DIAGNOSIS — Z78.9 IMPAIRED MOBILITY AND ADLS: ICD-10-CM

## 2022-12-01 DIAGNOSIS — Z74.09 IMPAIRED MOBILITY AND ADLS: ICD-10-CM

## 2022-12-01 DIAGNOSIS — R31.0 GROSS HEMATURIA: ICD-10-CM

## 2022-12-01 DIAGNOSIS — R33.9 URINARY RETENTION: ICD-10-CM

## 2022-12-01 DIAGNOSIS — Z74.09 IMPAIRED FUNCTIONAL MOBILITY, BALANCE, GAIT, AND ENDURANCE: ICD-10-CM
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
## 2022-12-01 DIAGNOSIS — S72.002A CLOSED FRACTURE OF PROXIMAL END OF LEFT FEMUR, INITIAL ENCOUNTER: Primary | ICD-10-CM

## 2022-12-01 LAB
ALBUMIN SERPL-MCNC: 4.2 G/DL (ref 3.5–5.2)
ALBUMIN/GLOB SERPL: 1.8 G/DL
ALP SERPL-CCNC: 99 U/L (ref 39–117)
ALT SERPL W P-5'-P-CCNC: 15 U/L (ref 1–41)
ANION GAP SERPL CALCULATED.3IONS-SCNC: 14 MMOL/L (ref 5–15)
AST SERPL-CCNC: 21 U/L (ref 1–40)
BASOPHILS # BLD AUTO: 0.03 10*3/MM3 (ref 0–0.2)
BASOPHILS NFR BLD AUTO: 0.3 % (ref 0–1.5)
BILIRUB SERPL-MCNC: 0.6 MG/DL (ref 0–1.2)
BILIRUB UR QL STRIP: NEGATIVE
BUN SERPL-MCNC: 20 MG/DL (ref 8–23)
BUN/CREAT SERPL: 17.2 (ref 7–25)
CALCIUM SPEC-SCNC: 9.6 MG/DL (ref 8.6–10.5)
CHLORIDE SERPL-SCNC: 106 MMOL/L (ref 98–107)
CLARITY UR: CLEAR
CO2 SERPL-SCNC: 22 MMOL/L (ref 22–29)
COLOR UR: YELLOW
CREAT SERPL-MCNC: 1.16 MG/DL (ref 0.76–1.27)
DEPRECATED RDW RBC AUTO: 40.7 FL (ref 37–54)
EGFRCR SERPLBLD CKD-EPI 2021: 61 ML/MIN/1.73
EOSINOPHIL # BLD AUTO: 0.06 10*3/MM3 (ref 0–0.4)
EOSINOPHIL NFR BLD AUTO: 0.5 % (ref 0.3–6.2)
ERYTHROCYTE [DISTWIDTH] IN BLOOD BY AUTOMATED COUNT: 12.3 % (ref 12.3–15.4)
GLOBULIN UR ELPH-MCNC: 2.3 GM/DL
GLUCOSE SERPL-MCNC: 158 MG/DL (ref 65–99)
GLUCOSE UR STRIP-MCNC: NEGATIVE MG/DL
HCT VFR BLD AUTO: 42 % (ref 37.5–51)
HGB BLD-MCNC: 14.6 G/DL (ref 13–17.7)
HGB UR QL STRIP.AUTO: NEGATIVE
IMM GRANULOCYTES # BLD AUTO: 0.1 10*3/MM3 (ref 0–0.05)
IMM GRANULOCYTES NFR BLD AUTO: 0.9 % (ref 0–0.5)
KETONES UR QL STRIP: NEGATIVE
LEUKOCYTE ESTERASE UR QL STRIP.AUTO: NEGATIVE
LYMPHOCYTES # BLD AUTO: 1.78 10*3/MM3 (ref 0.7–3.1)
LYMPHOCYTES NFR BLD AUTO: 15.6 % (ref 19.6–45.3)
MCH RBC QN AUTO: 31.7 PG (ref 26.6–33)
MCHC RBC AUTO-ENTMCNC: 34.8 G/DL (ref 31.5–35.7)
MCV RBC AUTO: 91.3 FL (ref 79–97)
MONOCYTES # BLD AUTO: 0.82 10*3/MM3 (ref 0.1–0.9)
MONOCYTES NFR BLD AUTO: 7.2 % (ref 5–12)
NEUTROPHILS NFR BLD AUTO: 75.5 % (ref 42.7–76)
NEUTROPHILS NFR BLD AUTO: 8.61 10*3/MM3 (ref 1.7–7)
NITRITE UR QL STRIP: NEGATIVE
NRBC BLD AUTO-RTO: 0 /100 WBC (ref 0–0.2)
PH UR STRIP.AUTO: 7 [PH] (ref 5–9)
PLATELET # BLD AUTO: 150 10*3/MM3 (ref 140–450)
PMV BLD AUTO: 10.3 FL (ref 6–12)
POTASSIUM SERPL-SCNC: 3.5 MMOL/L (ref 3.5–5.2)
PROT SERPL-MCNC: 6.5 G/DL (ref 6–8.5)
PROT UR QL STRIP: ABNORMAL
RBC # BLD AUTO: 4.6 10*6/MM3 (ref 4.14–5.8)
SODIUM SERPL-SCNC: 142 MMOL/L (ref 136–145)
SP GR UR STRIP: 1.01 (ref 1–1.03)
UROBILINOGEN UR QL STRIP: ABNORMAL
WBC NRBC COR # BLD: 11.4 10*3/MM3 (ref 3.4–10.8)

## 2022-12-01 PROCEDURE — G0378 HOSPITAL OBSERVATION PER HR: HCPCS

## 2022-12-01 PROCEDURE — 25010000002 FENTANYL CITRATE (PF) 50 MCG/ML SOLUTION: Performed by: INTERNAL MEDICINE

## 2022-12-01 PROCEDURE — 81003 URINALYSIS AUTO W/O SCOPE: CPT

## 2022-12-01 PROCEDURE — 25010000002 FENTANYL CITRATE (PF) 50 MCG/ML SOLUTION: Performed by: EMERGENCY MEDICINE

## 2022-12-01 PROCEDURE — 73630 X-RAY EXAM OF FOOT: CPT

## 2022-12-01 PROCEDURE — 93010 ELECTROCARDIOGRAM REPORT: CPT | Performed by: INTERNAL MEDICINE

## 2022-12-01 PROCEDURE — 85610 PROTHROMBIN TIME: CPT | Performed by: INTERNAL MEDICINE

## 2022-12-01 PROCEDURE — 93005 ELECTROCARDIOGRAM TRACING: CPT | Performed by: INTERNAL MEDICINE

## 2022-12-01 PROCEDURE — 73502 X-RAY EXAM HIP UNI 2-3 VIEWS: CPT

## 2022-12-01 PROCEDURE — 72192 CT PELVIS W/O DYE: CPT

## 2022-12-01 PROCEDURE — 51702 INSERT TEMP BLADDER CATH: CPT

## 2022-12-01 PROCEDURE — 85025 COMPLETE CBC W/AUTO DIFF WBC: CPT

## 2022-12-01 PROCEDURE — 36415 COLL VENOUS BLD VENIPUNCTURE: CPT

## 2022-12-01 PROCEDURE — 73552 X-RAY EXAM OF FEMUR 2/>: CPT

## 2022-12-01 PROCEDURE — 80053 COMPREHEN METABOLIC PANEL: CPT

## 2022-12-01 PROCEDURE — 25010000002 HYDRALAZINE PER 20 MG

## 2022-12-01 PROCEDURE — 99285 EMERGENCY DEPT VISIT HI MDM: CPT

## 2022-12-01 RX ORDER — ATORVASTATIN CALCIUM 40 MG/1
40 TABLET, FILM COATED ORAL NIGHTLY
Status: DISCONTINUED | OUTPATIENT
Start: 2022-12-01 | End: 2022-12-09 | Stop reason: HOSPADM

## 2022-12-01 RX ORDER — FENTANYL CITRATE 50 UG/ML
25 INJECTION, SOLUTION INTRAMUSCULAR; INTRAVENOUS ONCE
Status: COMPLETED | OUTPATIENT
Start: 2022-12-01 | End: 2022-12-01

## 2022-12-01 RX ORDER — HYDRALAZINE HYDROCHLORIDE 20 MG/ML
10 INJECTION INTRAMUSCULAR; INTRAVENOUS ONCE
Status: COMPLETED | OUTPATIENT
Start: 2022-12-01 | End: 2022-12-01

## 2022-12-01 RX ORDER — ALBUTEROL SULFATE 2.5 MG/3ML
2.5 SOLUTION RESPIRATORY (INHALATION) EVERY 4 HOURS PRN
Status: DISCONTINUED | OUTPATIENT
Start: 2022-12-01 | End: 2022-12-09 | Stop reason: HOSPADM

## 2022-12-01 RX ORDER — FENTANYL CITRATE 50 UG/ML
25 INJECTION, SOLUTION INTRAMUSCULAR; INTRAVENOUS EVERY 6 HOURS PRN
Status: DISCONTINUED | OUTPATIENT
Start: 2022-12-01 | End: 2022-12-02

## 2022-12-01 RX ORDER — ACETAMINOPHEN 325 MG/1
650 TABLET ORAL EVERY 6 HOURS PRN
Status: DISCONTINUED | OUTPATIENT
Start: 2022-12-01 | End: 2022-12-09 | Stop reason: HOSPADM

## 2022-12-01 RX ORDER — CLONIDINE HYDROCHLORIDE 0.2 MG/1
0.2 TABLET ORAL ONCE
Status: COMPLETED | OUTPATIENT
Start: 2022-12-01 | End: 2022-12-01

## 2022-12-01 RX ORDER — SODIUM CHLORIDE 9 MG/ML
40 INJECTION, SOLUTION INTRAVENOUS AS NEEDED
Status: DISCONTINUED | OUTPATIENT
Start: 2022-12-01 | End: 2022-12-09 | Stop reason: HOSPADM

## 2022-12-01 RX ORDER — AMOXICILLIN 250 MG
2 CAPSULE ORAL 2 TIMES DAILY PRN
Status: DISCONTINUED | OUTPATIENT
Start: 2022-12-01 | End: 2022-12-09 | Stop reason: HOSPADM

## 2022-12-01 RX ORDER — BUDESONIDE AND FORMOTEROL FUMARATE DIHYDRATE 80; 4.5 UG/1; UG/1
2 AEROSOL RESPIRATORY (INHALATION)
Status: DISCONTINUED | OUTPATIENT
Start: 2022-12-01 | End: 2022-12-09 | Stop reason: HOSPADM

## 2022-12-01 RX ORDER — SODIUM CHLORIDE 0.9 % (FLUSH) 0.9 %
10 SYRINGE (ML) INJECTION EVERY 12 HOURS SCHEDULED
Status: DISCONTINUED | OUTPATIENT
Start: 2022-12-01 | End: 2022-12-09 | Stop reason: HOSPADM

## 2022-12-01 RX ORDER — LABETALOL HYDROCHLORIDE 5 MG/ML
10 INJECTION, SOLUTION INTRAVENOUS ONCE
Status: COMPLETED | OUTPATIENT
Start: 2022-12-01 | End: 2022-12-01

## 2022-12-01 RX ORDER — RIVASTIGMINE 4.6 MG/24H
1 PATCH, EXTENDED RELEASE TRANSDERMAL DAILY
Status: DISCONTINUED | OUTPATIENT
Start: 2022-12-02 | End: 2022-12-09 | Stop reason: HOSPADM

## 2022-12-01 RX ORDER — NITROGLYCERIN 0.4 MG/1
0.4 TABLET SUBLINGUAL
Status: DISCONTINUED | OUTPATIENT
Start: 2022-12-01 | End: 2022-12-09 | Stop reason: HOSPADM

## 2022-12-01 RX ORDER — SODIUM CHLORIDE 0.9 % (FLUSH) 0.9 %
10 SYRINGE (ML) INJECTION AS NEEDED
Status: DISCONTINUED | OUTPATIENT
Start: 2022-12-01 | End: 2022-12-09 | Stop reason: HOSPADM

## 2022-12-01 RX ORDER — ESCITALOPRAM OXALATE 5 MG/1
5 TABLET ORAL DAILY
Status: DISCONTINUED | OUTPATIENT
Start: 2022-12-02 | End: 2022-12-09 | Stop reason: HOSPADM

## 2022-12-01 RX ORDER — ASPIRIN 81 MG/1
81 TABLET, CHEWABLE ORAL DAILY
Status: DISCONTINUED | OUTPATIENT
Start: 2022-12-02 | End: 2022-12-09 | Stop reason: HOSPADM

## 2022-12-01 RX ORDER — MULTIPLE VITAMINS W/ MINERALS TAB 9MG-400MCG
1 TAB ORAL DAILY
Status: DISCONTINUED | OUTPATIENT
Start: 2022-12-02 | End: 2022-12-09 | Stop reason: HOSPADM

## 2022-12-01 RX ORDER — ENOXAPARIN SODIUM 100 MG/ML
40 INJECTION SUBCUTANEOUS DAILY
Status: DISCONTINUED | OUTPATIENT
Start: 2022-12-03 | End: 2022-12-09 | Stop reason: HOSPADM

## 2022-12-01 RX ADMIN — CLONIDINE HYDROCHLORIDE 0.2 MG: 0.2 TABLET ORAL at 19:19

## 2022-12-01 RX ADMIN — ATORVASTATIN CALCIUM 40 MG: 40 TABLET, FILM COATED ORAL at 23:11

## 2022-12-01 RX ADMIN — FENTANYL CITRATE 25 MCG: 50 INJECTION, SOLUTION INTRAMUSCULAR; INTRAVENOUS at 23:00

## 2022-12-01 RX ADMIN — Medication 10 ML: at 23:11

## 2022-12-01 RX ADMIN — FENTANYL CITRATE 25 MCG: 50 INJECTION, SOLUTION INTRAMUSCULAR; INTRAVENOUS at 18:25

## 2022-12-01 RX ADMIN — LABETALOL HYDROCHLORIDE 10 MG: 5 INJECTION, SOLUTION INTRAVENOUS at 23:09

## 2022-12-01 RX ADMIN — HYDRALAZINE HYDROCHLORIDE 10 MG: 20 INJECTION INTRAMUSCULAR; INTRAVENOUS at 20:45

## 2022-12-01 NOTE — ED NOTES
Patient arrives from home via EMS with c/o left hip pain after fall. Patient did not lose consciousness. Patient states was getting up from having dinner and his foot slipped.

## 2022-12-01 NOTE — ED PROVIDER NOTES
"Subjective   History of Present Illness  87 year old male patient presents to ER after fall tonight. He stood up from being seated and fell forward, landing on wooden floor after loosing balance. No LOC. He was unable to get up on his own and is complaining of left hip pain. He is unable to use pain scale, states \"its up there.\" no other complaints. Denies tobacco, ETOH, or illicit drug use.        Review of Systems   Constitutional: Negative.    HENT: Negative.    Eyes: Negative.    Respiratory: Negative.    Cardiovascular: Negative.    Gastrointestinal: Negative.    Endocrine: Negative.    Genitourinary: Negative.    Musculoskeletal: Positive for arthralgias and gait problem.   Skin: Negative.    Allergic/Immunologic: Negative.    Hematological: Negative.    Psychiatric/Behavioral: Negative.        Past Medical History:   Diagnosis Date   • Dementia (HCC)    • Hyperlipidemia    • Myocardial infarction (HCC)        Allergies   Allergen Reactions   • Morphine Unknown - High Severity   • Tramadol Unknown - High Severity       Past Surgical History:   Procedure Laterality Date   • CAROTID STENT         History reviewed. No pertinent family history.    Social History     Socioeconomic History   • Marital status:    Tobacco Use   • Smoking status: Never   Vaping Use   • Vaping Use: Unknown   Substance and Sexual Activity   • Alcohol use: Not Currently   • Drug use: Never           Objective    BP (!) 191/90 (BP Location: Left arm, Patient Position: Lying)   Pulse 84   Temp 97.6 °F (36.4 °C) (Oral)   Resp 20   Ht 165.1 cm (65\")   Wt 63.5 kg (140 lb)   SpO2 96%   BMI 23.30 kg/m²     Physical Exam  Vitals and nursing note reviewed.   Constitutional:       General: He is not in acute distress.     Appearance: Normal appearance. He is not ill-appearing, toxic-appearing or diaphoretic.   HENT:      Head: Normocephalic.      Nose: Nose normal.      Mouth/Throat:      Mouth: Mucous membranes are moist.   Eyes:     "  Pupils: Pupils are equal, round, and reactive to light.   Cardiovascular:      Rate and Rhythm: Normal rate and regular rhythm.      Pulses: Normal pulses.      Heart sounds: Normal heart sounds.   Pulmonary:      Effort: Pulmonary effort is normal.      Breath sounds: Normal breath sounds.   Abdominal:      General: Bowel sounds are normal.      Palpations: Abdomen is soft.   Musculoskeletal:         General: Tenderness present. No swelling or deformity. Normal range of motion.      Cervical back: Normal range of motion.      Comments: Tenderness to left hip, no deformity, swelling, rotation, or shortening appreciated. Pedal pulse, motor and sensory intact.   Skin:     General: Skin is warm and dry.      Capillary Refill: Capillary refill takes less than 2 seconds.   Neurological:      Mental Status: He is alert and oriented to person, place, and time.   Psychiatric:         Mood and Affect: Mood normal.         Behavior: Behavior normal.         Thought Content: Thought content normal.         Judgment: Judgment normal.         Procedures           ED Course  ED Course as of 12/01/22 2122   Thu Dec 01, 2022   1859 Spoke with patient and wife about his results. Will attempt to stand and ambulate patient to assess mobility.  [HS]   2113 Spoke with Dr. Danielle who is agreeable with admission. [HS]      ED Course User Index  [HS] Paloma Grajeda, APRN            Results for orders placed or performed during the hospital encounter of 10/09/22   TSH    Specimen: Blood   Result Value Ref Range    TSH 2.400 0.270 - 4.200 uIU/mL   Vitamin B12    Specimen: Blood   Result Value Ref Range    Vitamin B-12 522 211 - 946 pg/mL   Folate    Specimen: Blood   Result Value Ref Range    Folate 6.59 4.78 - 24.20 ng/mL   Vitamin D 25 Hydroxy    Specimen: Blood   Result Value Ref Range    25 Hydroxy, Vitamin D 37.0 30.0 - 100.0 ng/ml   HIV-1 / O / 2 Ag / Antibody 4th Generation    Specimen: Blood   Result Value Ref Range    HIV-1/  HIV-2 Non-Reactive Non-Reactive   ECG 12 Lead   Result Value Ref Range    QT Interval 434 ms    QTC Interval 519 ms     XR Foot 3+ View Left    Result Date: 12/1/2022  XR FOOT 3 OR MORE VIEWS COMPARISONS: None. ADDITIONAL PERTINENT HISTORY: Fall with pain FINDINGS:   Osseous structures: Mild diffuse osteopenia. No bony fractures. Joint spaces: Negative. Surrounding soft tissues: Negative.     1. Diffuse osteopenia. 2. No acute appearing bony abnormalities. Electronically signed by:  Darin Michel MD  12/1/2022 8:35 PM CST Workstation: [id number]    CT Pelvis Without Contrast    Result Date: 12/1/2022  EXAM: CT PELVIS WITHOUT IV CONTRAST ORDERING PROVIDER: JOSHUA NANCE CLINICAL HISTORY: Pain on the left side COMPARISON STUDY: Radiograph of the same date TECHNIQUE: A multislice scan was obtained of the pelvic girdle and left hip in the axial plane without IV contrast. Reformatted images were generated in the sagittal and coronal planes. This exam was performed according to our departmental dose-optimization program, which includes automated exposure control, adjustment of the mA and/or kV according to the patient's size and/or use of iterative reconstruction technique. FINDINGS: Discontinuity of the cortex of the base of the femoral neck on axial image 61 along both the lateral and medial margin, with a subtle lucency visualized on coronal image 35, and axial image 64, consistent with nondisplaced fracture of the left proximal femur along the intratrochanteric region. Buckling of the left inferior pubic ramus on axial image 66, and along the lateral aspect of the left superior pubic ramus on axial image 49 and this is due to fracture of indeterminate age. Coexisting fracture deformity of the left lateral sacral ala on axial image 29. There is significant distention of the urinary bladder, and this patient may benefit from catheterization of the urinary bladder for decompression.     Acute nondisplaced fracture of  the left proximal femur along the intratrochanteric region. Fracture deformity of indeterminate age involving the left pubic rami and lateral aspect of the left sacral ala. There is significant distention of the urinary bladder, and this patient may benefit from catheterization of the urinary bladder for decompression. Electronically signed by:  Panfilo Horton MD  12/1/2022 8:50 PM CST Workstation: [id number]    XR Hip With or Without Pelvis 2 - 3 View Left    Result Date: 12/1/2022  EXAM DESCRIPTION: XR HIP W OR WO PELVIS 2-3 VIEW LEFT 12/1/2022 6:15 PM CST CLINICAL HISTORY:Fall, injury COMPARISON: None. TECHNIQUE: 3 views of the left hip. FINDINGS: Osteopenia. No acute fracture or traumatic malalignment. Postsurgical changes of the right acetabulum and superior pubic ramus. Surgical clips projecting over the left pelvis. Pelvic phleboliths.     Osteopenia. No acute fracture or traumatic malalignment. Electronically signed by:  Meir Vu MD  12/1/2022 6:42 PM CST Workstation: [id number]                                      [facility]    Final diagnoses:   Fall, initial encounter   Closed fracture of proximal end of left femur, initial encounter (HCC)   Urinary retention       ED Disposition  ED Disposition     ED Disposition   Decision to Admit    Condition   --    Comment   Level of Care: Med/Surg [1]   Diagnosis: Closed fracture of proximal end of left femur, initial encounter ([facility]) [[id number]]   Admitting Physician: BEHROOZI, SAEID [[id number]]   Attending Physician: BEHROOZI, SAEID [[id number]]               No follow-up provider specified.       Medication List      No changes were made to your prescriptions during this visit.          Paloma Grajeda, APRN  12/01/22 2122

## 2022-12-02 ENCOUNTER — APPOINTMENT (OUTPATIENT)
Dept: ULTRASOUND IMAGING | Facility: HOSPITAL | Age: 87
End: 2022-12-02

## 2022-12-02 ENCOUNTER — ANESTHESIA (OUTPATIENT)
Dept: PERIOP | Facility: HOSPITAL | Age: 87
End: 2022-12-02

## 2022-12-02 ENCOUNTER — APPOINTMENT (OUTPATIENT)
Dept: CARDIOLOGY | Facility: HOSPITAL | Age: 87
End: 2022-12-02

## 2022-12-02 ENCOUNTER — ANESTHESIA EVENT (OUTPATIENT)
Dept: PERIOP | Facility: HOSPITAL | Age: 87
End: 2022-12-02

## 2022-12-02 ENCOUNTER — APPOINTMENT (OUTPATIENT)
Dept: GENERAL RADIOLOGY | Facility: HOSPITAL | Age: 87
End: 2022-12-02

## 2022-12-02 LAB
25(OH)D3 SERPL-MCNC: 36.4 NG/ML (ref 30–100)
ALBUMIN SERPL-MCNC: 4.1 G/DL (ref 3.5–5.2)
ALBUMIN/GLOB SERPL: 1.7 G/DL
ALP SERPL-CCNC: 93 U/L (ref 39–117)
ALT SERPL W P-5'-P-CCNC: 14 U/L (ref 1–41)
ANION GAP SERPL CALCULATED.3IONS-SCNC: 14 MMOL/L (ref 5–15)
AST SERPL-CCNC: 21 U/L (ref 1–40)
BH CV ECHO MEAS - AO MAX PG: 4.7 MMHG
BH CV ECHO MEAS - AO V2 MAX: 108.6 CM/SEC
BH CV ECHO MEAS - TR MAX PG: 35 MMHG
BH CV ECHO MEAS - TR MAX VEL: 295.9 CM/SEC
BILIRUB SERPL-MCNC: 0.9 MG/DL (ref 0–1.2)
BUN SERPL-MCNC: 20 MG/DL (ref 8–23)
BUN/CREAT SERPL: 17.4 (ref 7–25)
CALCIUM SPEC-SCNC: 9.6 MG/DL (ref 8.6–10.5)
CHLORIDE SERPL-SCNC: 107 MMOL/L (ref 98–107)
CO2 SERPL-SCNC: 22 MMOL/L (ref 22–29)
CREAT SERPL-MCNC: 1.15 MG/DL (ref 0.76–1.27)
DEPRECATED RDW RBC AUTO: 41.4 FL (ref 37–54)
EGFRCR SERPLBLD CKD-EPI 2021: 61.6 ML/MIN/1.73
ERYTHROCYTE [DISTWIDTH] IN BLOOD BY AUTOMATED COUNT: 12.2 % (ref 12.3–15.4)
FOLATE SERPL-MCNC: 8.81 NG/ML (ref 4.78–24.2)
GLOBULIN UR ELPH-MCNC: 2.4 GM/DL
GLUCOSE SERPL-MCNC: 156 MG/DL (ref 65–99)
HBA1C MFR BLD: 5.5 % (ref 4.8–5.6)
HCT VFR BLD AUTO: 42.3 % (ref 37.5–51)
HCT VFR BLD AUTO: 42.6 % (ref 37.5–51)
HGB BLD-MCNC: 14.4 G/DL (ref 13–17.7)
HGB BLD-MCNC: 14.5 G/DL (ref 13–17.7)
INR PPP: 1.21 (ref 0.8–1.2)
MAGNESIUM SERPL-MCNC: 1.5 MG/DL (ref 1.6–2.4)
MAXIMAL PREDICTED HEART RATE: 133 BPM
MCH RBC QN AUTO: 31.7 PG (ref 26.6–33)
MCHC RBC AUTO-ENTMCNC: 34.3 G/DL (ref 31.5–35.7)
MCV RBC AUTO: 92.4 FL (ref 79–97)
PLATELET # BLD AUTO: 145 10*3/MM3 (ref 140–450)
PMV BLD AUTO: 10.2 FL (ref 6–12)
POTASSIUM SERPL-SCNC: 3.3 MMOL/L (ref 3.5–5.2)
PROT SERPL-MCNC: 6.5 G/DL (ref 6–8.5)
PROTHROMBIN TIME: 15.3 SECONDS (ref 11.1–15.3)
RBC # BLD AUTO: 4.58 10*6/MM3 (ref 4.14–5.8)
SODIUM SERPL-SCNC: 143 MMOL/L (ref 136–145)
STRESS TARGET HR: 113 BPM
TSH SERPL DL<=0.05 MIU/L-ACNC: 2.64 UIU/ML (ref 0.27–4.2)
VIT B12 BLD-MCNC: 325 PG/ML (ref 211–946)
WBC NRBC COR # BLD: 11.06 10*3/MM3 (ref 3.4–10.8)

## 2022-12-02 PROCEDURE — 94640 AIRWAY INHALATION TREATMENT: CPT

## 2022-12-02 PROCEDURE — C1713 ANCHOR/SCREW BN/BN,TIS/BN: HCPCS | Performed by: STUDENT IN AN ORGANIZED HEALTH CARE EDUCATION/TRAINING PROGRAM

## 2022-12-02 PROCEDURE — 27245 TREAT THIGH FRACTURE: CPT | Performed by: STUDENT IN AN ORGANIZED HEALTH CARE EDUCATION/TRAINING PROGRAM

## 2022-12-02 PROCEDURE — 97163 PT EVAL HIGH COMPLEX 45 MIN: CPT

## 2022-12-02 PROCEDURE — 84443 ASSAY THYROID STIM HORMONE: CPT | Performed by: INTERNAL MEDICINE

## 2022-12-02 PROCEDURE — 99221 1ST HOSP IP/OBS SF/LOW 40: CPT | Performed by: STUDENT IN AN ORGANIZED HEALTH CARE EDUCATION/TRAINING PROGRAM

## 2022-12-02 PROCEDURE — 93321 DOPPLER ECHO F-UP/LMTD STD: CPT | Performed by: INTERNAL MEDICINE

## 2022-12-02 PROCEDURE — 82607 VITAMIN B-12: CPT | Performed by: INTERNAL MEDICINE

## 2022-12-02 PROCEDURE — 25010000002 CEFAZOLIN PER 500 MG: Performed by: FAMILY MEDICINE

## 2022-12-02 PROCEDURE — 83036 HEMOGLOBIN GLYCOSYLATED A1C: CPT | Performed by: INTERNAL MEDICINE

## 2022-12-02 PROCEDURE — 25010000002 PROPOFOL 200 MG/20ML EMULSION: Performed by: NURSE ANESTHETIST, CERTIFIED REGISTERED

## 2022-12-02 PROCEDURE — 80053 COMPREHEN METABOLIC PANEL: CPT | Performed by: INTERNAL MEDICINE

## 2022-12-02 PROCEDURE — 93308 TTE F-UP OR LMTD: CPT | Performed by: INTERNAL MEDICINE

## 2022-12-02 PROCEDURE — 25010000002 PHENYLEPHRINE 10 MG/ML SOLUTION: Performed by: NURSE ANESTHETIST, CERTIFIED REGISTERED

## 2022-12-02 PROCEDURE — 93325 DOPPLER ECHO COLOR FLOW MAPG: CPT | Performed by: INTERNAL MEDICINE

## 2022-12-02 PROCEDURE — 85014 HEMATOCRIT: CPT | Performed by: STUDENT IN AN ORGANIZED HEALTH CARE EDUCATION/TRAINING PROGRAM

## 2022-12-02 PROCEDURE — 83735 ASSAY OF MAGNESIUM: CPT | Performed by: INTERNAL MEDICINE

## 2022-12-02 PROCEDURE — 25010000002 DEXAMETHASONE PER 1 MG: Performed by: NURSE ANESTHETIST, CERTIFIED REGISTERED

## 2022-12-02 PROCEDURE — 0QS734Z REPOSITION LEFT UPPER FEMUR WITH INTERNAL FIXATION DEVICE, PERCUTANEOUS APPROACH: ICD-10-PCS | Performed by: STUDENT IN AN ORGANIZED HEALTH CARE EDUCATION/TRAINING PROGRAM

## 2022-12-02 PROCEDURE — 93321 DOPPLER ECHO F-UP/LMTD STD: CPT

## 2022-12-02 PROCEDURE — 25010000002 FENTANYL CITRATE PF 50 MCG/ML SOLUTION PREFILLED SYRINGE: Performed by: NURSE ANESTHETIST, CERTIFIED REGISTERED

## 2022-12-02 PROCEDURE — 97166 OT EVAL MOD COMPLEX 45 MIN: CPT

## 2022-12-02 PROCEDURE — 93325 DOPPLER ECHO COLOR FLOW MAPG: CPT

## 2022-12-02 PROCEDURE — 76775 US EXAM ABDO BACK WALL LIM: CPT

## 2022-12-02 PROCEDURE — 93308 TTE F-UP OR LMTD: CPT

## 2022-12-02 PROCEDURE — 82746 ASSAY OF FOLIC ACID SERUM: CPT | Performed by: INTERNAL MEDICINE

## 2022-12-02 PROCEDURE — 85027 COMPLETE CBC AUTOMATED: CPT | Performed by: INTERNAL MEDICINE

## 2022-12-02 PROCEDURE — 25010000002 ONDANSETRON PER 1 MG: Performed by: NURSE ANESTHETIST, CERTIFIED REGISTERED

## 2022-12-02 PROCEDURE — 76000 FLUOROSCOPY <1 HR PHYS/QHP: CPT

## 2022-12-02 PROCEDURE — 25010000002 KETOROLAC TROMETHAMINE PER 15 MG: Performed by: NURSE ANESTHETIST, CERTIFIED REGISTERED

## 2022-12-02 PROCEDURE — 85018 HEMOGLOBIN: CPT | Performed by: STUDENT IN AN ORGANIZED HEALTH CARE EDUCATION/TRAINING PROGRAM

## 2022-12-02 PROCEDURE — 82306 VITAMIN D 25 HYDROXY: CPT | Performed by: INTERNAL MEDICINE

## 2022-12-02 PROCEDURE — 25010000002 CEFAZOLIN PER 500 MG: Performed by: STUDENT IN AN ORGANIZED HEALTH CARE EDUCATION/TRAINING PROGRAM

## 2022-12-02 PROCEDURE — C1769 GUIDE WIRE: HCPCS | Performed by: STUDENT IN AN ORGANIZED HEALTH CARE EDUCATION/TRAINING PROGRAM

## 2022-12-02 DEVICE — LAG SCREW, TI
Type: IMPLANTABLE DEVICE | Site: HIP | Status: FUNCTIONAL
Brand: GAMMA

## 2022-12-02 DEVICE — LOCKING SCREW, FULLY THREADED: Type: IMPLANTABLE DEVICE | Site: HIP | Status: FUNCTIONAL

## 2022-12-02 DEVICE — TROCHANTERIC NAIL KIT, TI
Type: IMPLANTABLE DEVICE | Site: HIP | Status: FUNCTIONAL
Brand: GAMMA

## 2022-12-02 RX ORDER — FLUMAZENIL 0.1 MG/ML
0.2 INJECTION INTRAVENOUS AS NEEDED
Status: DISCONTINUED | OUTPATIENT
Start: 2022-12-02 | End: 2022-12-02 | Stop reason: HOSPADM

## 2022-12-02 RX ORDER — KETOROLAC TROMETHAMINE 30 MG/ML
INJECTION, SOLUTION INTRAMUSCULAR; INTRAVENOUS AS NEEDED
Status: DISCONTINUED | OUTPATIENT
Start: 2022-12-02 | End: 2022-12-02 | Stop reason: SURG

## 2022-12-02 RX ORDER — ACETAMINOPHEN 325 MG/1
650 TABLET ORAL ONCE AS NEEDED
Status: DISCONTINUED | OUTPATIENT
Start: 2022-12-02 | End: 2022-12-02 | Stop reason: HOSPADM

## 2022-12-02 RX ORDER — NALOXONE HCL 0.4 MG/ML
0.4 VIAL (ML) INJECTION AS NEEDED
Status: DISCONTINUED | OUTPATIENT
Start: 2022-12-02 | End: 2022-12-02 | Stop reason: HOSPADM

## 2022-12-02 RX ORDER — PROMETHAZINE HYDROCHLORIDE 25 MG/1
25 SUPPOSITORY RECTAL ONCE AS NEEDED
Status: DISCONTINUED | OUTPATIENT
Start: 2022-12-02 | End: 2022-12-02 | Stop reason: HOSPADM

## 2022-12-02 RX ORDER — BUPIVACAINE HCL/0.9 % NACL/PF 0.1 %
2 PLASTIC BAG, INJECTION (ML) EPIDURAL EVERY 8 HOURS
Status: COMPLETED | OUTPATIENT
Start: 2022-12-02 | End: 2022-12-05

## 2022-12-02 RX ORDER — DEXAMETHASONE SODIUM PHOSPHATE 4 MG/ML
INJECTION, SOLUTION INTRA-ARTICULAR; INTRALESIONAL; INTRAMUSCULAR; INTRAVENOUS; SOFT TISSUE AS NEEDED
Status: DISCONTINUED | OUTPATIENT
Start: 2022-12-02 | End: 2022-12-02 | Stop reason: SURG

## 2022-12-02 RX ORDER — MEPERIDINE HYDROCHLORIDE 25 MG/ML
12.5 INJECTION INTRAMUSCULAR; INTRAVENOUS; SUBCUTANEOUS
Status: DISCONTINUED | OUTPATIENT
Start: 2022-12-02 | End: 2022-12-02 | Stop reason: HOSPADM

## 2022-12-02 RX ORDER — CEFAZOLIN SODIUM IN 0.9 % NACL 3 G/100 ML
3 INTRAVENOUS SOLUTION, PIGGYBACK (ML) INTRAVENOUS ONCE
Status: DISCONTINUED | OUTPATIENT
Start: 2022-12-02 | End: 2022-12-02 | Stop reason: SDUPTHER

## 2022-12-02 RX ORDER — DIPHENHYDRAMINE HYDROCHLORIDE 50 MG/ML
12.5 INJECTION INTRAMUSCULAR; INTRAVENOUS
Status: DISCONTINUED | OUTPATIENT
Start: 2022-12-02 | End: 2022-12-02 | Stop reason: HOSPADM

## 2022-12-02 RX ORDER — FENTANYL CITRATE 50 UG/ML
12.5 INJECTION, SOLUTION INTRAMUSCULAR; INTRAVENOUS EVERY 6 HOURS PRN
Status: DISCONTINUED | OUTPATIENT
Start: 2022-12-02 | End: 2022-12-03

## 2022-12-02 RX ORDER — ACETAMINOPHEN 650 MG/1
650 SUPPOSITORY RECTAL ONCE AS NEEDED
Status: DISCONTINUED | OUTPATIENT
Start: 2022-12-02 | End: 2022-12-02 | Stop reason: HOSPADM

## 2022-12-02 RX ORDER — BUPIVACAINE HCL/0.9 % NACL/PF 0.1 %
2 PLASTIC BAG, INJECTION (ML) EPIDURAL ONCE
Status: COMPLETED | OUTPATIENT
Start: 2022-12-02 | End: 2022-12-02

## 2022-12-02 RX ORDER — LIDOCAINE HYDROCHLORIDE 20 MG/ML
INJECTION, SOLUTION EPIDURAL; INFILTRATION; INTRACAUDAL; PERINEURAL AS NEEDED
Status: DISCONTINUED | OUTPATIENT
Start: 2022-12-02 | End: 2022-12-02 | Stop reason: SURG

## 2022-12-02 RX ORDER — ONDANSETRON 2 MG/ML
4 INJECTION INTRAMUSCULAR; INTRAVENOUS ONCE AS NEEDED
Status: DISCONTINUED | OUTPATIENT
Start: 2022-12-02 | End: 2022-12-02 | Stop reason: HOSPADM

## 2022-12-02 RX ORDER — EPHEDRINE SULFATE 50 MG/ML
5 INJECTION, SOLUTION INTRAVENOUS ONCE AS NEEDED
Status: DISCONTINUED | OUTPATIENT
Start: 2022-12-02 | End: 2022-12-02 | Stop reason: HOSPADM

## 2022-12-02 RX ORDER — ONDANSETRON 2 MG/ML
INJECTION INTRAMUSCULAR; INTRAVENOUS AS NEEDED
Status: DISCONTINUED | OUTPATIENT
Start: 2022-12-02 | End: 2022-12-02 | Stop reason: SURG

## 2022-12-02 RX ORDER — PROMETHAZINE HYDROCHLORIDE 25 MG/1
25 TABLET ORAL ONCE AS NEEDED
Status: DISCONTINUED | OUTPATIENT
Start: 2022-12-02 | End: 2022-12-02 | Stop reason: HOSPADM

## 2022-12-02 RX ORDER — FENTANYL CITRATE 50 UG/ML
INJECTION, SOLUTION INTRAMUSCULAR; INTRAVENOUS AS NEEDED
Status: DISCONTINUED | OUTPATIENT
Start: 2022-12-02 | End: 2022-12-02 | Stop reason: SURG

## 2022-12-02 RX ORDER — HYDROXYZINE PAMOATE 25 MG/1
25 CAPSULE ORAL ONCE AS NEEDED
Status: COMPLETED | OUTPATIENT
Start: 2022-12-02 | End: 2022-12-02

## 2022-12-02 RX ORDER — SODIUM CHLORIDE, SODIUM GLUCONATE, SODIUM ACETATE, POTASSIUM CHLORIDE AND MAGNESIUM CHLORIDE 526; 502; 368; 37; 30 MG/100ML; MG/100ML; MG/100ML; MG/100ML; MG/100ML
INJECTION, SOLUTION INTRAVENOUS CONTINUOUS PRN
Status: DISCONTINUED | OUTPATIENT
Start: 2022-12-02 | End: 2022-12-02 | Stop reason: SURG

## 2022-12-02 RX ORDER — LANOLIN ALCOHOL/MO/W.PET/CERES
3 CREAM (GRAM) TOPICAL NIGHTLY PRN
Status: DISCONTINUED | OUTPATIENT
Start: 2022-12-02 | End: 2022-12-09 | Stop reason: HOSPADM

## 2022-12-02 RX ORDER — PROPOFOL 10 MG/ML
INJECTION, EMULSION INTRAVENOUS AS NEEDED
Status: DISCONTINUED | OUTPATIENT
Start: 2022-12-02 | End: 2022-12-02 | Stop reason: SURG

## 2022-12-02 RX ORDER — PHENYLEPHRINE HYDROCHLORIDE 10 MG/ML
INJECTION INTRAVENOUS AS NEEDED
Status: DISCONTINUED | OUTPATIENT
Start: 2022-12-02 | End: 2022-12-02 | Stop reason: SURG

## 2022-12-02 RX ADMIN — Medication 2 G: at 11:19

## 2022-12-02 RX ADMIN — SODIUM CHLORIDE, SODIUM GLUCONATE, SODIUM ACETATE, POTASSIUM CHLORIDE AND MAGNESIUM CHLORIDE: 526; 502; 368; 37; 30 INJECTION, SOLUTION INTRAVENOUS at 11:09

## 2022-12-02 RX ADMIN — PHENYLEPHRINE HYDROCHLORIDE 100 MCG: 10 INJECTION, SOLUTION INTRAVENOUS at 12:40

## 2022-12-02 RX ADMIN — KETOROLAC TROMETHAMINE 15 MG: 30 INJECTION, SOLUTION INTRAMUSCULAR at 12:59

## 2022-12-02 RX ADMIN — PHENYLEPHRINE HYDROCHLORIDE 100 MCG: 10 INJECTION, SOLUTION INTRAVENOUS at 12:28

## 2022-12-02 RX ADMIN — KETOROLAC TROMETHAMINE 15 MG: 30 INJECTION, SOLUTION INTRAMUSCULAR at 12:34

## 2022-12-02 RX ADMIN — Medication 10 ML: at 20:49

## 2022-12-02 RX ADMIN — PHENYLEPHRINE HYDROCHLORIDE 150 MCG: 10 INJECTION, SOLUTION INTRAVENOUS at 11:47

## 2022-12-02 RX ADMIN — FENTANYL CITRATE 25 MCG: 50 INJECTION, SOLUTION INTRAMUSCULAR; INTRAVENOUS at 12:02

## 2022-12-02 RX ADMIN — ATORVASTATIN CALCIUM 40 MG: 40 TABLET, FILM COATED ORAL at 20:49

## 2022-12-02 RX ADMIN — PROPOFOL 80 MG: 10 INJECTION, EMULSION INTRAVENOUS at 11:16

## 2022-12-02 RX ADMIN — PROPOFOL 30 MG: 10 INJECTION, EMULSION INTRAVENOUS at 12:05

## 2022-12-02 RX ADMIN — DEXAMETHASONE SODIUM PHOSPHATE 4 MG: 4 INJECTION, SOLUTION INTRAMUSCULAR; INTRAVENOUS at 11:37

## 2022-12-02 RX ADMIN — CEFAZOLIN 2 G: 10 INJECTION, POWDER, FOR SOLUTION INTRAVENOUS at 18:07

## 2022-12-02 RX ADMIN — PHENYLEPHRINE HYDROCHLORIDE 100 MCG: 10 INJECTION, SOLUTION INTRAVENOUS at 12:16

## 2022-12-02 RX ADMIN — BUDESONIDE AND FORMOTEROL FUMARATE DIHYDRATE 2 PUFF: 80; 4.5 AEROSOL RESPIRATORY (INHALATION) at 09:45

## 2022-12-02 RX ADMIN — ONDANSETRON 4 MG: 2 INJECTION INTRAMUSCULAR; INTRAVENOUS at 12:33

## 2022-12-02 RX ADMIN — MELATONIN 3 MG: 3 TAB ORAL at 20:49

## 2022-12-02 RX ADMIN — FENTANYL CITRATE 25 MCG: 50 INJECTION, SOLUTION INTRAMUSCULAR; INTRAVENOUS at 12:21

## 2022-12-02 RX ADMIN — LIDOCAINE HYDROCHLORIDE 60 MG: 20 INJECTION, SOLUTION EPIDURAL; INFILTRATION; INTRACAUDAL; PERINEURAL at 11:16

## 2022-12-02 RX ADMIN — PHENYLEPHRINE HYDROCHLORIDE 150 MCG: 10 INJECTION, SOLUTION INTRAVENOUS at 11:32

## 2022-12-02 RX ADMIN — HYDROXYZINE PAMOATE 25 MG: 25 CAPSULE ORAL at 20:49

## 2022-12-02 RX ADMIN — TRANEXAMIC ACID 1000 MG: 100 INJECTION, SOLUTION INTRAVENOUS at 12:31

## 2022-12-02 RX ADMIN — TRANEXAMIC ACID 1000 MG: 100 INJECTION, SOLUTION INTRAVENOUS at 11:40

## 2022-12-02 NOTE — PROGRESS NOTES
[facility]  INPATIENT PROGRESS NOTE    Length of Stay: 1  Date of Admission: 12/1/2022  Primary Care Physician: Nicole Burgos APRN    Subjective   Chief Complaint: Hip pain  HPI:    Admitted for hip fracture after a fall.  Will be going in for hip repair today.    Review of Systems   Unable to perform ROS: Mental status change        All pertinent negatives and positives are as above. All other systems have been reviewed and are negative unless otherwise stated.     Objective    Temp:  [97.1 °F (36.2 °C)-98 °F (36.7 °C)] 97.8 °F (36.6 °C)  Heart Rate:  [78-91] 80  Resp:  [18-20] 18  BP: (130-197)/(60-93) 157/79  Physical Exam  Vitals and nursing note reviewed.   Constitutional:       General: He is not in acute distress.     Appearance: He is well-developed. He is not diaphoretic.   HENT:      Head: Normocephalic and atraumatic.   Cardiovascular:      Rate and Rhythm: Normal rate.   Pulmonary:      Effort: Pulmonary effort is normal. No respiratory distress.      Breath sounds: No wheezing.   Abdominal:      General: There is no distension.      Palpations: Abdomen is soft.   Musculoskeletal:         General: Normal range of motion.   Skin:     General: Skin is warm and dry.   Neurological:      Mental Status: He is alert.      Cranial Nerves: No cranial nerve deficit.   Psychiatric:      Comments: Confusion present             Results Review:  I have reviewed the labs, radiology results, and diagnostic studies.    Laboratory Data:   Lab Results (last 24 hours)     Procedure Component Value Units Date/Time    Hemoglobin & Hematocrit, Blood [[id number]]  (Normal) Collected: 12/02/22 1318    Specimen: Blood Updated: 12/02/22 1327     Hemoglobin 14.4 g/dL      Hematocrit 42.6 %     Vitamin D,25-Hydroxy [[id number]]  (Normal) Collected: 12/02/22 0708    Specimen: Blood Updated: 12/02/22 1313     25 Hydroxy, Vitamin D 36.4 ng/ml     Narrative:      Reference  Range for Total Vitamin D 25(OH)     Deficiency <20.0 ng/mL   Insufficiency 21-29 ng/mL   Sufficiency  ng/mL  Toxicity >100 ng/ml    Results may be falsely increased if patient taking Biotin.      Folate [[id number]]  (Normal) Collected: 12/02/22 0708    Specimen: Blood Updated: 12/02/22 1313     Folate 8.81 ng/mL     Narrative:      Results may be falsely increased if patient taking Biotin.      Vitamin B12 [[id number]]  (Normal) Collected: 12/02/22 0708    Specimen: Blood Updated: 12/02/22 1313     Vitamin B-12 325 pg/mL     Narrative:      Results may be falsely increased if patient taking Biotin.      TSH [[id number]]  (Normal) Collected: 12/02/22 0708    Specimen: Blood Updated: 12/02/22 0804     TSH 2.640 uIU/mL     Comprehensive Metabolic Panel [[id number]]  (Abnormal) Collected: 12/02/22 0708    Specimen: Blood Updated: 12/02/22 0759     Glucose 156 mg/dL      BUN 20 mg/dL      Creatinine 1.15 mg/dL      Sodium 143 mmol/L      Potassium 3.3 mmol/L      Chloride 107 mmol/L      CO2 22.0 mmol/L      Calcium 9.6 mg/dL      Total Protein 6.5 g/dL      Albumin 4.10 g/dL      ALT (SGPT) 14 U/L      AST (SGOT) 21 U/L      Alkaline Phosphatase 93 U/L      Total Bilirubin 0.9 mg/dL      Globulin 2.4 gm/dL      A/G Ratio 1.7 g/dL      BUN/Creatinine Ratio 17.4     Anion Gap 14.0 mmol/L      eGFR 61.6 mL/min/1.73      Comment: National Kidney Foundation and American Society of Nephrology (ASN) Task Force recommended calculation based on the Chronic Kidney Disease Epidemiology Collaboration (CKD-EPI) equation refit without adjustment for race.       Narrative:      GFR Normal >60  Chronic Kidney Disease <60  Kidney Failure <15    The GFR formula is only valid for adults with stable renal function between ages 18 and 70.    Magnesium [[id number]]  (Abnormal) Collected: 12/02/22 0708    Specimen: Blood Updated: 12/02/22 0759     Magnesium 1.5 mg/dL     Hemoglobin A1c [[id number]]  (Normal) Collected: 12/02/22 0708     Specimen: Blood Updated: 12/02/22 0750     Hemoglobin A1C 5.50 %     Narrative:      Hemoglobin A1C Ranges:    Increased Risk for Diabetes  5.7% to 6.4%  Diabetes                     >= 6.5%  Diabetic Goal                < 7.0%    CBC (No Diff) [[id number]]  (Abnormal) Collected: 12/02/22 0708    Specimen: Blood Updated: 12/02/22 0740     WBC 11.06 10*3/mm3      RBC 4.58 10*6/mm3      Hemoglobin 14.5 g/dL      Hematocrit 42.3 %      MCV 92.4 fL      MCH 31.7 pg      MCHC 34.3 g/dL      RDW 12.2 %      RDW-SD 41.4 fl      MPV 10.2 fL      Platelets 145 10*3/mm3     Protime-INR [[id number]]  (Abnormal) Collected: 12/01/22 2338    Specimen: Blood Updated: 12/02/22 0021     Protime 15.3 Seconds      INR 1.21    Narrative:      Therapeutic range for most indications is 2.0-3.0 INR,  or 2.5-3.5 for mechanical heart valves.    Comprehensive Metabolic Panel [[id number]]  (Abnormal) Collected: 12/01/22 2145    Specimen: Blood Updated: 12/01/22 2238     Glucose 158 mg/dL      BUN 20 mg/dL      Creatinine 1.16 mg/dL      Sodium 142 mmol/L      Potassium 3.5 mmol/L      Chloride 106 mmol/L      CO2 22.0 mmol/L      Calcium 9.6 mg/dL      Total Protein 6.5 g/dL      Albumin 4.20 g/dL      ALT (SGPT) 15 U/L      AST (SGOT) 21 U/L      Alkaline Phosphatase 99 U/L      Total Bilirubin 0.6 mg/dL      Globulin 2.3 gm/dL      A/G Ratio 1.8 g/dL      BUN/Creatinine Ratio 17.2     Anion Gap 14.0 mmol/L      eGFR 61.0 mL/min/1.73      Comment: National Kidney Foundation and American Society of Nephrology (ASN) Task Force recommended calculation based on the Chronic Kidney Disease Epidemiology Collaboration (CKD-EPI) equation refit without adjustment for race.       Narrative:      GFR Normal >60  Chronic Kidney Disease <60  Kidney Failure <15    The GFR formula is only valid for adults with stable renal function between ages 18 and 70.    CBC & Differential [[id number]]  (Abnormal) Collected: 12/01/22 2145    Specimen: Blood Updated:  12/01/22 2227    Narrative:      The following orders were created for panel order CBC & Differential.  Procedure                               Abnormality         Status                     ---------                               -----------         ------                     CBC Auto Differential[[id number]]        Abnormal            Final result                 Please view results for these tests on the individual orders.    CBC Auto Differential [[id number]]  (Abnormal) Collected: 12/01/22 2145    Specimen: Blood Updated: 12/01/22 2227     WBC 11.40 10*3/mm3      RBC 4.60 10*6/mm3      Hemoglobin 14.6 g/dL      Hematocrit 42.0 %      MCV 91.3 fL      MCH 31.7 pg      MCHC 34.8 g/dL      RDW 12.3 %      RDW-SD 40.7 fl      MPV 10.3 fL      Platelets 150 10*3/mm3      Neutrophil % 75.5 %      Lymphocyte % 15.6 %      Monocyte % 7.2 %      Eosinophil % 0.5 %      Basophil % 0.3 %      Immature Grans % 0.9 %      Neutrophils, Absolute 8.61 10*3/mm3      Lymphocytes, Absolute 1.78 10*3/mm3      Monocytes, Absolute 0.82 10*3/mm3      Eosinophils, Absolute 0.06 10*3/mm3      Basophils, Absolute 0.03 10*3/mm3      Immature Grans, Absolute 0.10 10*3/mm3      nRBC 0.0 /100 WBC     Urinalysis With Microscopic If Indicated (No Culture) - Urine, Clean Catch [[id number]]  (Abnormal) Collected: 12/01/22 2129    Specimen: Urine, Clean Catch Updated: 12/01/22 2136     Color, UA Yellow     Appearance, UA Clear     pH, UA 7.0     Specific Gravity, UA 1.015     Glucose, UA Negative     Ketones, UA Negative     Bilirubin, UA Negative     Blood, UA Negative     Protein, UA Trace     Leuk Esterase, UA Negative     Nitrite, UA Negative     Urobilinogen, UA 0.2 E.U./dL    Narrative:      Urine microscopic not indicated.          Culture Data:   No results found for: BLOODCX  No results found for: URINECX  No results found for: RESPCX  No results found for: WOUNDCX  No results found for: STOOLCX  [facility] components found for:  [facility]    Radiology Data:   Imaging Results (Last 24 Hours)     Procedure Component Value Units Date/Time    FL C Arm During Surgery [[id number]] Resulted: 12/02/22 1331     Updated: 12/02/22 1331    XR Femur 2 View Left [[id number]] Collected: 12/01/22 2156     Updated: 12/01/22 2227    Narrative:      Left femur x-ray two views on 12/1/2022    CLINICAL INDICATION: Left intertrochanteric femur fracture    COMPARISON: Left hip x-ray from 12/1/2022 and CT pelvis from  12/1/2022    FINDINGS: There is an acute, oblique, nondisplaced left  intertrochanteric femur fracture. There is diffuse osteopenia. No  other fracture is noted. Vascular calcifications are noted.  Visualized joints are well aligned.      Impression:      Acute left intertrochanteric femur fracture.    Electronically signed by:  Noel Arias  12/1/2022 10:25 PM  CST Workstation: [id number]    CT Pelvis Without Contrast [[id number]] Collected: 12/01/22 1941     Updated: 12/01/22 2053    Narrative:      EXAM:  CT PELVIS WITHOUT IV CONTRAST    ORDERING PROVIDER:  JOSHUA NANCE    CLINICAL HISTORY:  Pain on the left side    COMPARISON STUDY:  Radiograph of the same date    TECHNIQUE:  A multislice scan was obtained of the pelvic girdle and left hip  in the axial plane without IV contrast. Reformatted images were  generated in the sagittal and coronal planes.   This exam was performed according to our departmental  dose-optimization program, which includes automated exposure  control, adjustment of the mA and/or kV according to the  patient's size and/or use of iterative reconstruction technique.     FINDINGS:  Discontinuity of the cortex of the base of the femoral neck on  axial image 61 along both the lateral and medial margin, with a  subtle lucency visualized on coronal image 35, and axial image  64, consistent with nondisplaced fracture of the left proximal  femur along the intratrochanteric region.    Buckling of the left inferior pubic ramus on  axial image 66, and  along the lateral aspect of the left superior pubic ramus on  axial image 49 and this is due to fracture of indeterminate age.  Coexisting fracture deformity of the left lateral sacral ala on  axial image 29.    There is significant distention of the urinary bladder, and this  patient may benefit from catheterization of the urinary bladder  for decompression.      Impression:      Acute nondisplaced fracture of the left proximal femur along the  intratrochanteric region.  Fracture deformity of indeterminate age involving the left pubic  rami and lateral aspect of the left sacral ala.  There is significant distention of the urinary bladder, and this  patient may benefit from catheterization of the urinary bladder  for decompression.    Electronically signed by:  Panfilo Horton MD  12/1/2022 8:50 PM CST  Workstation: [id number]    XR Foot 3+ View Left [[id number]] Collected: 12/01/22 2007     Updated: 12/01/22 2038    Narrative:      XR FOOT 3 OR MORE VIEWS    COMPARISONS: None.    ADDITIONAL PERTINENT HISTORY: Fall with pain    FINDINGS:       Osseous structures: Mild diffuse osteopenia. No bony fractures.    Joint spaces: Negative.    Surrounding soft tissues: Negative.      Impression:      1. Diffuse osteopenia.  2. No acute appearing bony abnormalities.    Electronically signed by:  Darin Michel MD  12/1/2022 8:35 PM CST  Workstation: [id number]    XR Hip With or Without Pelvis 2 - 3 View Left [[id number]] Collected: 12/01/22 1815     Updated: 12/01/22 1844    Narrative:      EXAM DESCRIPTION: XR HIP W OR WO PELVIS 2-3 VIEW LEFT 12/1/2022  6:15 PM CST    CLINICAL HISTORY:Fall, injury    COMPARISON: None.    TECHNIQUE:     3 views of the left hip.     FINDINGS:     Osteopenia.    No acute fracture or traumatic malalignment.    Postsurgical changes of the right acetabulum and superior pubic  ramus.    Surgical clips projecting over the left pelvis.    Pelvic phleboliths.       Impression:       Osteopenia. No acute fracture or traumatic malalignment.    Electronically signed by:  Meir Vu MD  12/1/2022 6:42 PM  CST Workstation: [id number]          I have reviewed the patient's current medications.     Assessment/Plan     Active Hospital Problems    Diagnosis    • **Closed fracture of proximal end of left femur, initial encounter ([facility])        Left femur fracture-status post repair, orthopedics managing, will continue with PT OT    Hematuria-we will continue to monitor has a Godwin catheter.  May need urology to see him as well    Hypertension-continue managing blood pressure    Dyslipidemia-continue with Lipitor     pain-continue with pain control    DVT prophylaxis-SCD            Jordy Fernandez MD   12/02/22   13:42 CST

## 2022-12-02 NOTE — PLAN OF CARE
Goal Outcome Evaluation:  Plan of Care Reviewed With: patient, spouse           Outcome Evaluation: OT eval complete, co-eval with PT. Patient alert x 1, pleasant and agreeable for eval. Supine<>sit dependent x2. Patient requires min A for static sitting balance. Sit to stand with mod A x 2 person. Patient did take several steps towards HOB and foot of bed with mod A x 2 person and RW. Returned to bed, VSS. Patient with decreased balance, hx of dementia, decreased safety in transfers, and decreased independence in ADLs. Cont inpatient OT. Recommend SNF.

## 2022-12-02 NOTE — THERAPY EVALUATION
Patient Name: Teofilo Brown  : 1935    MRN: [id number]                              Today's Date: 2022       Admit Date: 2022    Visit Dx:     ICD-10-CM ICD-9-CM   1. Closed fracture of proximal end of left femur, initial encounter (Formerly [facility])  S72.002A 820.8   2. Fall, initial encounter  W19.XXXA E888.9   3. Urinary retention  R33.9 788.20   4. 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, due to cerebrovascular dz)   • Cerebrovascular disease   • COPD (chronic obstructive pulmonary disease) (Formerly [facility])   • Benign essential HTN   • HLD (hyperlipidemia)   • CAD (coronary artery disease)   • Closed fracture of proximal end of left femur, initial encounter (Formerly [facility])     Past Medical History:   Diagnosis Date   • Dementia (Formerly [facility])    • Hyperlipidemia    • Myocardial infarction (Formerly [facility])      Past Surgical History:   Procedure Laterality Date   • CAROTID STENT        General Information     Row Name 22 1416          OT Time and Intention    Document Type evaluation  -     Mode of Treatment physical therapy;occupational therapy  -     Row Name 22 [facility]          General Information    Patient Profile Reviewed yes  -     Prior Level of Function independent:;feeding;grooming;min assist:;transfer;gait;mod assist:;dressing;bathing  -     Existing Precautions/Restrictions fall  -     Barriers to Rehab cognitive status;medically complex  -     Row Name 22 1416          Living Environment    People in Home spouse  -     Row Name 22 1416          Home Main Entrance    Number of Stairs, Main Entrance three  -     Row Name 22 1416          Stairs Within Home, Primary    Stairs, Within Home, Primary tub/shower, has a RW he uses, no other DME at home. Wife was planning to transition patient to NH for long term care.  -     Row Name  12/02/22 1416          Cognition    Orientation Status (Cognition) oriented to;person  -     Row Name 12/02/22 1416          Safety Issues, Functional Mobility    Impairments Affecting Function (Mobility) balance;endurance/activity tolerance;grasp  -           User Key  (r) = Recorded By, (t) = Taken By, (c) = Cosigned By    Initials Name Provider Type     Annemarie Funes OT Occupational Therapist                 Mobility/ADL's     Row Name 12/02/22 1416 12/02/22 1415       Bed Mobility    Bed Mobility -- supine-sit;sit-supine  -SJ    Supine-Sit [place] (Bed Mobility) -- dependent (less than 25% patient effort);2 person assist  -SJ    Sit-Supine [place] (Bed Mobility) -- dependent (less than 25% patient effort);2 person assist  -SJ    Supine-Sit-Supine [place] (Bed Mobility) --  -SJ dependent (less than 25% patient effort);2 person assist  -    Assistive Device (Bed Mobility) -- bed rails;head of bed elevated  -    Row Name 12/02/22 1416 12/02/22 1415       Transfers    Transfers --  -SJ sit-stand transfer;stand-sit transfer  -    Row Name 12/02/22 1415          Sit-Stand Transfer    Sit-Stand [place] (Transfers) moderate assist (50% patient effort)  -     Assistive Device (Sit-Stand Transfers) walker, front-wheeled  -     Row Name 12/02/22 1416 12/02/22 1415       Stand-Sit Transfer    Stand-Sit [place] (Transfers) --  -SJ 2 person assist;moderate assist (50% patient effort)  -    Assistive Device (Stand-Sit Transfers) --  -SJ walker, front-wheeled  -    Row Name 12/02/22 1416          Activities of Daily Living    BADL Assessment/Intervention --  -     Row Name 12/02/22 1416 12/02/22 1415       Lower Body Dressing Assessment/Training    [place] Level (Lower Body Dressing) --  -SJ doff;don;socks;dependent (less than 25% patient effort)  -          User Key  (r) = Recorded By, (t) = Taken By, (c) = Cosigned By    Initials Name Provider Type    Annemarie Man  JANNA SEARS Occupational Therapist               Obj/Interventions     [facility] Name 12/02/22 1415          Sensory Assessment (Somatosensory)    Sensory Assessment (Somatosensory) UE sensation intact  -[facility] Name 12/02/22 1415          Range of Motion Comprehensive    General Range of Motion bilateral upper extremity ROM WFL  -[facility] Name 12/02/22 1415          Strength Comprehensive (MMT)    General Manual Muscle Testing (MMT) Assessment other (see comments)  -     Comment, General Manual Muscle Testing (MMT) Assessment BUE 4/5 grossly  -SJ           User Key  (r) = Recorded By, (t) = Taken By, (c) = Cosigned By    Initials Name Provider Type     Annemarie Funes OT Occupational Therapist               Goals/Plan     Row Name 12/02/22 1416          Transfer Goal 1 (OT)    Activity/Assistive Device (Transfer Goal 1, OT) toilet  -SJ     [place] Level/Cues Needed (Transfer Goal 1, OT) minimum assist (75% or more patient effort)  -SJ     Time Frame (Transfer Goal 1, OT) long term goal (LTG);by discharge  -SJ     Progress/Outcome (Transfer Goal 1, OT) goal not met  -SJ     Row Name 12/02/22 1416          Bathing Goal 1 (OT)    Activity/Device (Bathing Goal 1, OT) lower body bathing  -SJ     [place] Level/Cues Needed (Bathing Goal 1, OT) minimum assist (75% or more patient effort)  -SJ     Time Frame (Bathing Goal 1, OT) long term goal (LTG);by discharge  -SJ     Progress/Outcomes (Bathing Goal 1, OT) goal not met  -SJ     Row Name 12/02/22 1416          Dressing Goal 1 (OT)    Activity/Device (Dressing Goal 1, OT) lower body dressing  -SJ     [place]/Cues Needed (Dressing Goal 1, OT) moderate assist (50-74% patient effort)  -SJ     Time Frame (Dressing Goal 1, OT) long term goal (LTG);by discharge  -     Progress/Outcome (Dressing Goal 1, OT) goal not met  -SJ     Row Name 12/02/22 1416          Toileting Goal 1 (OT)    Activity/Device (Toileting Goal 1, OT) toileting skills, all  -SJ      [place] Level/Cues Needed (Toileting Goal 1, OT) minimum assist (75% or more patient effort)  -     Time Frame (Toileting Goal 1, OT) long term goal (LTG);by discharge  -     Progress/Outcome (Toileting Goal 1, OT) goal not met  -     Row Name 12/02/22 1416          Therapy Assessment/Plan (OT)    Planned Therapy Interventions (OT) activity tolerance training;adaptive equipment training;BADL retraining;edema control/reduction;functional balance retraining;IADL retraining;cognitive/visual perception retraining;manual therapy/joint mobilization;occupation/activity based interventions;neuromuscular control/coordination retraining;passive ROM/stretching;patient/caregiver education/training;ROM/therapeutic exercise;strengthening exercise;transfer/mobility retraining  -           User Key  (r) = Recorded By, (t) = Taken By, (c) = Cosigned By    Initials Name Provider Type     Annemarie Funes, OT Occupational Therapist               Clinical Impression     Row Name 12/02/22 1416          Pain Assessment    Pretreatment Pain Rating 5/10  -SJ     Posttreatment Pain Rating 5/10  -SJ     Pain Location - Side/Orientation Left  -     Pain Location - hip  -     Pain Intervention(s) Medication (See MAR);Repositioned;Ambulation/increased activity  -     Row Name 12/02/22 1416          Plan of Care Review    Plan of Care Reviewed With patient;spouse  -     Outcome Evaluation OT eval complete, co-eval with PT. Patient alert x 1, pleasant and agreeable for eval. Supine<>sit dependent x2. Patient requires min A for static sitting balance. Sit to stand with mod A x 2 person. Patient did take several steps towards HOB and foot of bed with mod A x 2 person and RW. Returned to bed, VSS. Patient with decreased balance, hx of dementia, decreased safety in transfers, and decreased independence in ADLs. Cont inpatient OT. Recommend SNF.  -     Row Name 12/02/22 1416          Therapy Assessment/Plan (OT)     Patient/Family Therapy Goal Statement (OT) SNF for rehab  -     Rehab Potential (OT) good, to achieve stated therapy goals  -     Criteria for Skilled Therapeutic Interventions Met (OT) yes;skilled treatment is necessary  -     Therapy Frequency (OT) daily  -     Predicted Duration of Therapy Intervention (OT) until d/c or all goals met  -[facility] Name 12/02/22 1416          Therapy Plan Review/Discharge Plan (OT)    Anticipated Discharge Disposition (OT) skilled nursing facility  -     Row Name 12/02/22 1416          Vital Signs    Pre Systolic BP Rehab 170  -SJ     Pre Treatment Diastolic BP 78  -SJ     Post Systolic BP Rehab 176  -SJ     Post Treatment Diastolic BP 82  -SJ     Pretreatment Heart Rate (beats/min) 78  -SJ     Posttreatment Heart Rate (beats/min) 85  -SJ     Pre SpO2 (%) 95  -SJ     O2 Delivery Pre Treatment supplemental O2  -     Post SpO2 (%) 97  -SJ     O2 Delivery Post Treatment supplemental O2  -SJ     Pre Patient Position Supine  -SJ     Post Patient Position Supine  -[facility] Name 12/02/22 1416          Positioning and Restraints    Pre-Treatment Position in bed  -     Post Treatment Position bed  -SJ     In Bed notified nsg;supine;call light within reach;encouraged to call for assist;exit alarm on  -           User Key  (r) = Recorded By, (t) = Taken By, (c) = Cosigned By    Initials Name Provider Type     Annemarie Funes, OT Occupational Therapist               Outcome Measures     Row Name 12/02/22 1416          How much help from another is currently needed...    Putting on and taking off regular lower body clothing? 1  -SJ     Bathing (including washing, rinsing, and drying) 2  -SJ     Toileting (which includes using toilet bed pan or urinal) 1  -SJ     Putting on and taking off regular upper body clothing 2  -SJ     Taking care of personal grooming (such as brushing teeth) 3  -SJ     Eating meals 3  -SJ     AM-PAC 6 Clicks Score (OT) 12  -     Row Name 12/02/22  1420 12/02/22 1330       How much help from another person do you currently need...    Turning from your back to your side while in flat bed without using bedrails? 2  -LR 2  -RM    Moving from lying on back to sitting on the side of a flat bed without bedrails? 2  -LR 2  -RM    Moving to and from a bed to a chair (including a wheelchair)? 2  -LR 2  -RM    Standing up from a chair using your arms (e.g., wheelchair, bedside chair)? 2  -LR 2  -RM    Climbing 3-5 steps with a railing? 2  -LR 2  -RM    To walk in hospital room? 2  -LR 2  -RM    AM-PAC 6 Clicks Score (PT) 12  -LR 12  -RM    Highest level of mobility 4 --> Transferred to chair/commode  -LR 4 --> Transferred to chair/commode  -RM    Row Name 12/02/22 0915          How much help from another person do you currently need...    Turning from your back to your side while in flat bed without using bedrails? 3  -MB     Moving from lying on back to sitting on the side of a flat bed without bedrails? 1  -MB     Moving to and from a bed to a chair (including a wheelchair)? 1  -MB     Standing up from a chair using your arms (e.g., wheelchair, bedside chair)? 1  -MB     Climbing 3-5 steps with a railing? 1  -MB     To walk in hospital room? 1  -MB     AM-PAC 6 Clicks Score (PT) 8  -MB     Highest level of mobility 3 --> Sat at edge of bed  -MB     Row Name 12/02/22 1420 12/02/22 1416       Functional Assessment    Outcome Measure Options AM-PAC 6 Clicks Basic Mobility (PT)  -LR AM-PAC 6 Clicks Daily Activity (OT)  -SJ          User Key  (r) = Recorded By, (t) = Taken By, (c) = Cosigned By    Initials Name Provider Type    Kanika Peterson RN Registered Nurse    Nicole Alonso RN Registered Nurse    Milan Nichols Physical Therapist    Annemarie Man, OT Occupational Therapist                Occupational Therapy Education     Title: PT OT SLP Therapies (In Progress)     Topic: Occupational Therapy (In Progress)     Point: ADL training (In  Progress)     Description:   Instruct learner(s) on proper safety adaptation and remediation techniques during self care or transfers.   Instruct in proper use of assistive devices.              Learning Progress Summary           Patient Acceptance, E,TB, NR by  at 12/2/2022 1501    Comment: POC, role of OT                   Point: Home exercise program (Not Started)     Description:   Instruct learner(s) on appropriate technique for monitoring, assisting and/or progressing therapeutic exercises/activities.              Learner Progress:  Not documented in this visit.          Point: Precautions (Not Started)     Description:   Instruct learner(s) on prescribed precautions during self-care and functional transfers.              Learner Progress:  Not documented in this visit.          Point: Body mechanics (Not Started)     Description:   Instruct learner(s) on proper positioning and spine alignment during self-care, functional mobility activities and/or exercises.              Learner Progress:  Not documented in this visit.                      User Key     Initials Effective Dates Name Provider Type Discipline     06/14/21 -  Annemarie Funes, OT Occupational Therapist OT              OT Recommendation and Plan  Planned Therapy Interventions (OT): activity tolerance training, adaptive equipment training, BADL retraining, edema control/reduction, functional balance retraining, IADL retraining, cognitive/visual perception retraining, manual therapy/joint mobilization, occupation/activity based interventions, neuromuscular control/coordination retraining, passive ROM/stretching, patient/caregiver education/training, ROM/therapeutic exercise, strengthening exercise, transfer/mobility retraining  Therapy Frequency (OT): daily  Plan of Care Review  Plan of Care Reviewed With: patient, spouse  Outcome Evaluation: OT eval complete, co-eval with PT. Patient alert x 1, pleasant and agreeable for eval. Supine<>sit  dependent x2. Patient requires min A for static sitting balance. Sit to stand with mod A x 2 person. Patient did take several steps towards HOB and foot of bed with mod A x 2 person and RW. Returned to bed, VSS. Patient with decreased balance, hx of dementia, decreased safety in transfers, and decreased independence in ADLs. Cont inpatient OT. Recommend SNF.     Time Calculation:    Time Calculation- OT     Row Name 12/02/22 1502             Time Calculation- OT    OT Start Time 1416  -      OT Stop Time 1500  -      OT Time Calculation (min) 44 min  -SJ      OT Received On 12/02/22  -      OT Goal Re-Cert Due Date 12/15/22  -         Untimed Charges    OT Eval/Re-eval Minutes 44  -SJ         Total Minutes    Untimed Charges Total Minutes 44  -SJ       Total Minutes 44  -SJ            User Key  (r) = Recorded By, (t) = Taken By, (c) = Cosigned By    Initials Name Provider Type     Annemarie Funes OT Occupational Therapist              Therapy Charges for Today     Code Description Service Date Service Provider Modifiers Qty    [id number]  OT EVAL MOD COMPLEXITY 3 12/2/2022 Annemarie Funes OT GO 1               Annemarie Funes OT  12/2/2022

## 2022-12-02 NOTE — THERAPY EVALUATION
Patient Name: Teofilo Brown  : 1935    MRN: [id number]                              Today's Date: 2022       Admit Date: 2022    Visit Dx:     ICD-10-CM ICD-9-CM   1. Closed fracture of proximal end of left femur, initial encounter (Formerly [facility])  S72.002A 820.8   2. Fall, initial encounter  W19.XXXA E888.9   3. Urinary retention  R33.9 788.20   4. Impaired mobility and ADLs  Z74.09 V49.89    Z78.9    5. 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, due to cerebrovascular dz)   • Cerebrovascular disease   • COPD (chronic obstructive pulmonary disease) (Formerly [facility])   • Benign essential HTN   • HLD (hyperlipidemia)   • CAD (coronary artery disease)   • Closed fracture of proximal end of left femur, initial encounter (Formerly [facility])     Past Medical History:   Diagnosis Date   • Dementia (Formerly [facility])    • Hyperlipidemia    • Myocardial infarction (Formerly [facility])      Past Surgical History:   Procedure Laterality Date   • CAROTID STENT        General Information     Row Name 22 142          Physical Therapy Time and Intention    Document Type evaluation  -LR     Mode of Treatment physical therapy;occupational therapy  -LR     Row Name 22 1420          General Information    Patient Profile Reviewed yes  -LR     Prior Level of Function independent:;all household mobility;gait;transfer  -LR     Existing Precautions/Restrictions fall  -LR     Row Name 22 1420          Living Environment    People in Home spouse  -LR     Row Name 22 1420          Home Main Entrance    Number of Stairs, Main Entrance three  -LR     Row Name 22 1420          Stairs Within Home, Primary    Stairs, Within Home, Primary tub/shower, has a RW he uses, Rollator/SPC. Wife was planning to transition patient to NH for long term care.  -LR     Row Name 22 1428           Cognition    Orientation Status (Cognition) oriented to;person  -LR     Row Name 12/02/22 1420          Safety Issues, Functional Mobility    Safety Issues Affecting Function (Mobility) insight into deficits/self-awareness;safety precautions follow-through/compliance;safety precaution awareness;awareness of need for assistance;at risk behavior observed;ability to follow commands  -LR     Impairments Affecting Function (Mobility) balance;endurance/activity tolerance;grasp;pain;strength;shortness of breath  -LR           User Key  (r) = Recorded By, (t) = Taken By, (c) = Cosigned By    Initials Name Provider Type    Mialn Nichols Physical Therapist               Mobility     Row Name 12/02/22 1416          Bed Mobility    Bed Mobility supine-sit;sit-supine  -LR     Supine-Sit [place] (Bed Mobility) dependent (less than 25% patient effort);2 person assist  -LR     Sit-Supine [place] (Bed Mobility) dependent (less than 25% patient effort);2 person assist  -LR     Assistive Device (Bed Mobility) bed rails;head of bed elevated  -LR     Row Name 12/02/22 1416          Sit-Stand Transfer    Sit-Stand [place] (Transfers) moderate assist (50% patient effort)  -LR     Assistive Device (Sit-Stand Transfers) walker, front-wheeled  -LR     Row Name 12/02/22 1416          Gait/Stairs (Locomotion)    [place] Level (Gait) 2 person assist;verbal cues;nonverbal cues (demo/gesture);moderate assist (50% patient effort)  -LR     Assistive Device (Gait) walker, front-wheeled  -LR     Distance in Feet (Gait) 6'x1 laterally  -LR     Deviations/Abnormal Patterns (Gait) antalgic;base of support, narrow;araceli decreased;festinating/shuffling;gait speed decreased  -LR           User Key  (r) = Recorded By, (t) = Taken By, (c) = Cosigned By    Initials Name Provider Type    Milan Nichols Physical Therapist               Obj/Interventions     Row Name 12/02/22 1416          Range of Motion Comprehensive     Comment, General Range of Motion BLE grossly WFL  -LR     Row Name 12/02/22 1416          Strength Comprehensive (MMT)    Comment, General Manual Muscle Testing (MMT) Assessment Difficult to assess due to cognition, RLE grossly 3/5, LLE limit by hip pain  -LR     Row Name 12/02/22 1416          Sensory Assessment (Somatosensory)    Sensory Assessment (Somatosensory) unable/difficult to assess  -           User Key  (r) = Recorded By, (t) = Taken By, (c) = Cosigned By    Initials Name Provider Type    LR Milan Cole Physical Therapist               Goals/Plan     Row Name 12/02/22 1420          Bed Mobility Goal 1 (PT)    Activity/Assistive Device (Bed Mobility Goal 1, PT) sit to supine;supine to sit  -LR     [place] Level/Cues Needed (Bed Mobility Goal 1, PT) standby assist  -LR     Time Frame (Bed Mobility Goal 1, PT) by discharge  -LR     Progress/Outcomes (Bed Mobility Goal 1, PT) goal not met  -     Row Name 12/02/22 1420          Transfer Goal 1 (PT)    Activity/Assistive Device (Transfer Goal 1, PT) sit-to-stand/stand-to-sit;bed-to-chair/chair-to-bed  -LR     [place] Level/Cues Needed (Transfer Goal 1, PT) standby assist  -LR     Time Frame (Transfer Goal 1, PT) by discharge  -LR     Progress/Outcome (Transfer Goal 1, PT) goal not met  -LR     Row Name 12/02/22 1420          Gait Training Goal 1 (PT)    Activity/Assistive Device (Gait Training Goal 1, PT) gait (walking locomotion);assistive device use  -LR     [place] Level (Gait Training Goal 1, PT) standby assist  -LR     Distance (Gait Training Goal 1, PT) 50'x1  -LR     Time Frame (Gait Training Goal 1, PT) by discharge  -LR     Progress/Outcome (Gait Training Goal 1, PT) goal not met  -LR     Row Name 12/02/22 1420          Therapy Assessment/Plan (PT)    Planned Therapy Interventions (PT) balance training;bed mobility training;gait training;home exercise program;joint mobilization;lumbar stabilization;manual therapy  techniques;neuromuscular re-education;patient/family education;postural re-education;orthotic fitting/training;prosthetic fitting/training;ROM (range of motion);stair training;strengthening;stretching;transfer training;vestibular therapy;wheelchair management/propulsion training  -LR           User Key  (r) = Recorded By, (t) = Taken By, (c) = Cosigned By    Initials Name Provider Type    LR Milan Cole Physical Therapist               Clinical Impression     Row Name 12/02/22 1415          Pain    Pretreatment Pain Rating 5/10  -LR     Posttreatment Pain Rating 5/10  -LR     Row Name 12/02/22 1415          Plan of Care Review    Plan of Care Reviewed With patient;spouse  -LR     Outcome Evaluation PT eval completed. Patient alert and talkative but seems to be hallucinating. Dependentx2 for supine<>sit transfer with HOB up and bed rails, ModAx1 for sit<>stand transfer with RW and ModAx2 for ambulating laterally 6' x 1 left and right with RW. Patient required PT/OT to help with weight shifting, AD placement, and sequencing. Patient may benefit from cotreatment if only able to tolerate one therapy at day. Anticipate SNF at d/c.  -LR     Row Name 12/02/22 1415          Therapy Assessment/Plan (PT)    Patient/Family Therapy Goals Statement (PT) Family wants to go to SNF.  -LR     Rehab Potential (PT) fair, will monitor progress closely  -LR     Criteria for Skilled Interventions Met (PT) yes;meets criteria;skilled treatment is necessary  -LR     Therapy Frequency (PT) daily  -LR     Predicted Duration of Therapy Intervention (PT) Until d/c or until all goals met  -LR     Row Name 12/02/22 1415          Vital Signs    Pre Systolic BP Rehab 170  -LR     Pre Treatment Diastolic BP 78  -LR     Post Systolic BP Rehab 176  -LR     Post Treatment Diastolic BP 82  -LR     Pretreatment Heart Rate (beats/min) 78  -LR     Posttreatment Heart Rate (beats/min) 85  -LR     Pre SpO2 (%) 95  -LR     O2 Delivery Pre Treatment  supplemental O2  -LR     Post SpO2 (%) 97  -LR     O2 Delivery Post Treatment supplemental O2  -LR     Pre Patient Position Supine  -LR     Post Patient Position Supine  -LR     Row Name 12/02/22 1415          Positioning and Restraints    Pre-Treatment Position in bed  -LR     Post Treatment Position bed  -LR     In Bed notified nsg;call light within reach;encouraged to call for assist;exit alarm on  -LR           User Key  (r) = Recorded By, (t) = Taken By, (c) = Cosigned By    Initials Name Provider Type    Milan Nichols Physical Therapist               Outcome Measures     Row Name 12/02/22 1420 12/02/22 1330       How much help from another person do you currently need...    Turning from your back to your side while in flat bed without using bedrails? 2  -LR 2  -RM    Moving from lying on back to sitting on the side of a flat bed without bedrails? 2  -LR 2  -RM    Moving to and from a bed to a chair (including a wheelchair)? 2  -LR 2  -RM    Standing up from a chair using your arms (e.g., wheelchair, bedside chair)? 2  -LR 2  -RM    Climbing 3-5 steps with a railing? 2  -LR 2  -RM    To walk in hospital room? 2  -LR 2  -RM    AM-PAC 6 Clicks Score (PT) 12  -LR 12  -RM    Highest level of mobility 4 --> Transferred to chair/commode  -LR 4 --> Transferred to chair/commode  -RM    Row Name 12/02/22 0915          How much help from another person do you currently need...    Turning from your back to your side while in flat bed without using bedrails? 3  -MB     Moving from lying on back to sitting on the side of a flat bed without bedrails? 1  -MB     Moving to and from a bed to a chair (including a wheelchair)? 1  -MB     Standing up from a chair using your arms (e.g., wheelchair, bedside chair)? 1  -MB     Climbing 3-5 steps with a railing? 1  -MB     To walk in hospital room? 1  -MB     AM-PAC 6 Clicks Score (PT) 8  -MB     Highest level of mobility 3 --> Sat at edge of bed  -MB     Row Name 12/02/22 1420  12/02/22 1416       Functional Assessment    Outcome Measure Options AM-PAC 6 Clicks Basic Mobility (PT)  -LR AM-PAC 6 Clicks Daily Activity (OT)  -SJ          User Key  (r) = Recorded By, (t) = Taken By, (c) = Cosigned By    Initials Name Provider Type     Kanika Marquez RN Registered Nurse    NEYDA Julian, Nicole AGUIRRE RN Registered Nurse    Milan Nichols Physical Therapist    Annemarie Man, OT Occupational Therapist                             Physical Therapy Education     Title: PT OT SLP Therapies (In Progress)     Topic: Physical Therapy (Done)     Point: Mobility training (Done)     Learning Progress Summary           Patient Acceptance, E,TB, VU by LR at 12/2/2022 1458    Comment: Educated on PT POC and goals.                   Point: Home exercise program (Done)     Learning Progress Summary           Patient Acceptance, E,TB, VU by LR at 12/2/2022 1458    Comment: Educated on PT POC and goals.                   Point: Body mechanics (Done)     Learning Progress Summary           Patient Acceptance, E,TB, VU by LR at 12/2/2022 1458    Comment: Educated on PT POC and goals.                   Point: Precautions (Done)     Learning Progress Summary           Patient Acceptance, E,TB, VU by LR at 12/2/2022 1458    Comment: Educated on PT POC and goals.                               User Key     Initials Effective Dates Name Provider Type Discipline     06/16/21 -  Milan Cole Physical Therapist PT              PT Recommendation and Plan  Planned Therapy Interventions (PT): balance training, bed mobility training, gait training, home exercise program, joint mobilization, lumbar stabilization, manual therapy techniques, neuromuscular re-education, patient/family education, postural re-education, orthotic fitting/training, prosthetic fitting/training, ROM (range of motion), stair training, strengthening, stretching, transfer training, vestibular therapy, wheelchair management/propulsion  training  Plan of Care Reviewed With: patient, spouse  Outcome Evaluation: PT eval completed. Patient alert and talkative but seems to be hallucinating. Dependentx2 for supine<>sit transfer with HOB up and bed rails, ModAx1 for sit<>stand transfer with RW and ModAx2 for ambulating laterally 6' x 1 left and right with RW. Patient required PT/OT to help with weight shifting, AD placement, and sequencing. Patient may benefit from cotreatment if only able to tolerate one therapy at day. Anticipate SNF at d/c.     Time Calculation:    PT Charges     Row Name 12/02/22 1604             Time Calculation    Start Time 1415  -LR      Stop Time 1455  -LR      Time Calculation (min) 40 min  -LR      PT Received On 12/02/22  -LR      PT Goal Re-Cert Due Date 12/15/22  -LR         Untimed Charges    PT Eval/Re-eval Minutes 40  -LR         Total Minutes    Untimed Charges Total Minutes 40  -LR       Total Minutes 40  -LR            User Key  (r) = Recorded By, (t) = Taken By, (c) = Cosigned By    Initials Name Provider Type    LR Milan Cole Physical Therapist              Therapy Charges for Today     Code Description Service Date Service Provider Modifiers Qty    18018905983 HC PT EVAL HIGH COMPLEXITY 3 12/2/2022 Milan Cole GP 1          PT G-Codes  Outcome Measure Options: AM-PAC 6 Clicks Basic Mobility (PT)  AM-PAC 6 Clicks Score (PT): 12  AM-PAC 6 Clicks Score (OT): 12  PT Discharge Summary  Anticipated Discharge Disposition (PT): skilled nursing facility    Milan Cole  12/2/2022

## 2022-12-02 NOTE — PLAN OF CARE
Goal Outcome Evaluation:  Plan of Care Reviewed With: patient, spouse           Outcome Evaluation: PT eval completed. Patient alert and talkative but seems to be hallucinating. Dependentx2 for supine<>sit transfer with HOB up and bed rails, ModAx1 for sit<>stand transfer with RW and ModAx2 for ambulating laterally 6' x 1 left and right with RW. Patient required PT/OT to help with weight shifting, AD placement, and sequencing. Patient may benefit from cotreatment if only able to tolerate one therapy at day. Anticipate SNF at d/c.

## 2022-12-02 NOTE — PLAN OF CARE
Goal Outcome Evaluation:      Received from OR in stable condition           Problem: Pain Acute  Goal: Acceptable Pain Control and Functional Ability  Outcome: Ongoing, Not Progressing

## 2022-12-02 NOTE — ANESTHESIA PROCEDURE NOTES
Airway  Date/Time: 12/2/2022 11:16 AM  End Time:12/2/2022 11:16 AM    General Information and Staff    Patient location during procedure: OR  CRNA/CAA: Alka Caruso CRNA  SRNA: Cornell Velasquez SRNA  Indications and Patient Condition  Indications for airway management: airway protection and CNS depression    Preoxygenated: yes  MILS maintained throughout  Mask difficulty assessment: 0 - not attempted    Final Airway Details  Final airway type: supraglottic airway      Successful airway: I-gel  Size 4     Cormack-Lehane Classification: grade I - full view of glottis  Number of attempts at approach: 1  Assessment: lips, teeth, and gum same as pre-op

## 2022-12-02 NOTE — OP NOTE
Name: Teofilo Brown  YOB: 1935    DATE OF SURGERY: 12/2/2022    PREOPERATIVE DIAGNOSIS: Left  hip intertrochanteric fracture    POSTOPERATIVE DIAGNOSIS: Left hip intertrochanteric fracture    PROCEDURE PERFORMED: Left hip trochanteric femoral nail    SURGEON: Dino Danielle MD          Anesthesia: General    IMPLANTS:   Implant Name Type Inv. Item Serial No.  Lot No. LRB No. Used Action   NAIL FEM TROCH GAMMA3 TI 130DEG [id number] STRL - [id number] Implant NAIL FEM TROCH GAMMA3 TI 130DEG [id number] STRL  ZENY ANNITA [id number] Left 1 Implanted   SCRW LAG GAM3 TI 10.5X95MM STRL - [id number] Implant SCRW LAG GAM3 TI 10.5X95MM STRL  ZENY ANNITA [id number] Left 1 Implanted   SCRW LK FUL/THRD 5X37MM STRL - [id number] Implant SCRW LK FUL/THRD 5X37MM STRL  ZENY ANNIAT [id number] Left 1 Implanted       Estimated Blood Loss: 50 cc    Specimens :  None    Complications: None    DESCRIPTION OF PROCEDURE:      The patient was taken to the operating room and placed in the supine position.  Preoperative antibiotics were administered. Surgical timeout was performed. After adequate induction of anesthesia the patient was then transferred onto the [place] table and positioned appropriately in the supine position. All bony prominences were well padded.  The Left hip was then prepped and draped in the usual sterile fashion.  C-arm image intensification was then used to confirm proper reduction with indirect traction.  The AP and lateral images demonstrated near anatomic reduction.      A small stab wound was then made a few centimeters proximal to the tip of the greater trochanter.  The deep fascia was then incised in line with the skin incision.  A curved Salazar scissor was then inserted to the tip of the greater trochanter and spread dividing the gluteal muscle.  We then inserted a guide pin at the tip of the greater trochanter as visualized on the AP and lateral C-arm views.  The guide pin was then advanced and  images confirmed proper position.  We then over reamed the guide pin with the large reamer.  A size 13mm reamer was then passed by hand to ensure clear passage of the agustin.  The nail was then chosen, assembled on the back table, and inserted into the shaft of the femur.  We used C-arm image intensification to confirm that the nail had seated appropriately.   We again used C-arm image intensification to make sure that the fracture did not displace.   We used the proximal targeting arm to appropriately position the guide pin for the femoral neck screw.  We made sure that the tip of the guide pin was at the center position on AP and lateral views.  The guide pin was then measured, over drilled, and the final lag screw was placed over the guide pin.  We used C-arm image intensification to confirm that the guide pin did not advance through the femoral head.  After the lag screw had been placed in the appropriate position C-arm image intensification was used to confirm and then the locking bolt was seated fully to lock the lag screw.  The proximal targeting arm was then used to place the distal locking screw into the shaft of the femur.  The guide arm was then removed and the wounds were copiously irrigated with pulsatile lavage. C-arm images were taken of the hip which confirmed near anatomic reduction of the fracture and proper position of the hardware.     All wounds were then closed with 2-0 monocryl followed by staples.  Sterile dressings were applied and the patient was then removed from the fracture table and transferred to PACU for recovery from anesthesia.  At the end of the case the sponge and needle counts were reported to me as being correct and there were no known complications.      Dino Danielle MD    12/02/22 at 12:54 CST by Dino Danielle MD

## 2022-12-02 NOTE — ANESTHESIA POSTPROCEDURE EVALUATION
Patient: Teofilo Scruggs Stone    Procedure Summary     Date: 12/02/22 Room / Location: [facility] OR  / [facility] OR    Anesthesia Start: 1109 Anesthesia Stop: 1301    Procedure: HIP TROCHANTERIC NAILING SHORT WITH INTRAMEDULLARY HIP SCREW (Left: Hip) Diagnosis:       Closed fracture of proximal end of left femur, initial encounter ([facility])      (Closed fracture of proximal end of left femur, initial encounter ([facility]) [S72.002A])    Surgeons: Dino Danielle MD Provider: Alka Caruso CRNA    Anesthesia Type: general ASA Status: 4 - Emergent          Anesthesia Type: general    Vitals  Vitals Value Taken Time   /66 12/02/22 1252   Temp 97.1 °F (36.2 °C) 12/02/22 1252   Pulse 83 12/02/22 1252   Resp 20 12/02/22 1252   SpO2 94 % 12/02/22 1252           Post Anesthesia Care and Evaluation    Patient location during evaluation: PACU  Patient participation: complete - patient cannot participate  Level of consciousness: awake  Pain score: 0  Pain management: adequate    Airway patency: patent  Anesthetic complications: No anesthetic complications  PONV Status: none  Cardiovascular status: acceptable  Respiratory status: acceptable  Hydration status: acceptable    Comments: 144/66  79 bpm   18 rpm  94%  97.1

## 2022-12-02 NOTE — H&P
[facility] Medicine Admission      Date of Admission: 12/1/2022      Primary Care Physician: Nicole Burgos APRN      Chief Complaint: Fall    HPI:    Patient is a 87-year-old male with known past medical history of dementia myocardial infarction and history of 2 stent placement, does not follow with any particular cardiologist, hyperlipidemia, hypertension, who was taken of blood pressure medication sometime ago concerning normal blood pressures, as history of poor gait supposed to use walker, which she does not use this at home with his wife to ER via EMS after a fall with left hip pain.  In ER patient was diagnosed with left hip fracture.  Orthopedic service was consulted.  Patient was found to have urinary retention, with bladder scan of 1000 cc .  Godwin catheter was placed in ED. he had elevated blood pressures in ED.  He was given analgesics in ED for pain control.  Hospitalist service was called for admission of the patient.  Discussed the care with as needed Paloma.    I have seen and examined this patient in ED room 4.  His wife at bedside.  She does not provide any specific information in regard of his fall or any active complaint, including no left hip pain patient does talk about nonrelated issues.  Per report of his wife Gita at bedside patient got up from his chair walked few steps without walker, lost his balance and fell subsequently left side.  His wife was present.  Per his wife he did not have any head trauma or loss of consciousness.  Patient subsequently had left hip pain.  There is no history of recent urinary retention.  His wife he did not urinate since his fall.  As above mentioned no particular specific information is available from the patient by presence of dementia    Concurrent Medical History:  has a past medical history of Dementia (HCC), Hyperlipidemia, and Myocardial infarction (HCC).    Past Surgical History:  has a past surgical  history that includes Carotid stent.    Family History: family history is not on file.     Social History:  reports that he has never smoked. He does not have any smokeless tobacco history on file. He reports that he does not currently use alcohol. He reports that he does not use drugs.    Allergies:   Allergies   Allergen Reactions   • Morphine Unknown - High Severity   • Tramadol Unknown - High Severity       Medications:   Prior to Admission medications    Medication Sig Start Date End Date Taking? Authorizing Provider   albuterol sulfate  (90 Base) MCG/ACT inhaler Inhale 2 puffs Every 4 (Four) Hours As Needed for Shortness of Air or Wheezing. 1/1/22   Janina Motley APRN   aspirin 81 MG chewable tablet Chew 81 mg Daily. 1/1/22   Janina Motley APRN   atorvastatin (LIPITOR) 40 MG tablet Take 40 mg by mouth Every Night. 2/15/22   Janina Motley APRN   escitalopram (LEXAPRO) 5 MG tablet Take 1 tablet by mouth Daily. Indications: Major Depressive Disorder 10/20/22   Terry Drummond MD   fluticasone-salmeterol (ADVAIR HFA) 115-21 MCG/ACT inhaler Inhale 1 puff 2 (Two) Times a Day. 1/1/22   Janina Motley APRN   multivitamin with minerals tablet tablet Take 1 tablet by mouth Daily. 1/1/22   Janina Motley APRN   nitroglycerin (NITROSTAT) 0.4 MG SL tablet Place 0.4 mg under the tongue Every 5 (Five) Minutes As Needed. 2/14/22   Janina Motley APRN   rivastigmine (EXELON) 4.6 MG/24HR patch Place 1 patch on the skin as directed by provider Daily. Indications: Alzheimer's Disease 10/20/22   Teryr Drummond MD   Vitamin D, Cholecalciferol, (CHOLECALCIFEROL) 10 MCG (400 UNIT) tablet Take 400 Units by mouth Daily. 1/1/22   Janina Motley APRN       Review of Systems:  Review of Systems   Otherwise complete ROS is negative except as mentioned above.  Review of systems not obtainable from the patient secondary to his dementia.    Physical Exam:    Temp:  [97.6 °F (36.4 °C)] 97.6 °F (36.4 °C)  Heart Rate:  [82-86] 84  Resp:  [20] 20  BP: (191-197)/(90-93) 191/90  Physical Exam  Constitutional:       General: He is not in acute distress.     Appearance: He is normal weight. He is not ill-appearing, toxic-appearing or diaphoretic.   HENT:      Head: Normocephalic and atraumatic.      Right Ear: External ear normal.      Left Ear: External ear normal.      Nose: Nose normal.      Mouth/Throat:      Mouth: Mucous membranes are dry.      Pharynx: Oropharynx is clear.   Eyes:      Extraocular Movements: Extraocular movements intact.      Conjunctiva/sclera: Conjunctivae normal.      Pupils: Pupils are equal, round, and reactive to light.   Cardiovascular:      Rate and Rhythm: Normal rate and regular rhythm.      Heart sounds:     No friction rub. No gallop.   Pulmonary:      Effort: No respiratory distress.      Breath sounds: No stridor. No wheezing or rales.   Chest:      Chest wall: No tenderness.   Abdominal:      General: Abdomen is flat. There is no distension.      Palpations: Abdomen is soft.      Tenderness: There is no abdominal tenderness. There is no guarding or rebound.   Musculoskeletal:         General: No swelling or tenderness.      Cervical back: No rigidity or tenderness.      Right lower leg: No edema.      Left lower leg: No edema.      Comments: Injury of movement muscular strength examination to left lower extremity deferred.  Left lower extremity is in knee flexion position   Lymphadenopathy:      Cervical: No cervical adenopathy.   Skin:     General: Skin is warm and dry.      Coloration: Skin is not jaundiced.      Findings: No erythema.   Neurological:      Mental Status: He is alert. Mental status is at baseline.      Sensory: No sensory deficit.      Motor: No weakness.      Coordination: Coordination normal.   Psychiatric:         Mood and Affect: Mood normal.         Behavior: Behavior normal.         Judgment: Judgment normal.            Results Reviewed:  I have personally reviewed current lab, radiology, and data and agree with results.  Lab Results (last 24 hours)     Procedure Component Value Units Date/Time    Urinalysis With Microscopic If Indicated (No Culture) - Urine, Clean Catch [[id number]] Collected: 12/01/22 2129    Specimen: Urine, Clean Catch Updated: 12/01/22 2133        Imaging Results (Last 24 Hours)     Procedure Component Value Units Date/Time    CT Pelvis Without Contrast [[id number]] Collected: 12/01/22 1941     Updated: 12/01/22 2053    Narrative:      EXAM:  CT PELVIS WITHOUT IV CONTRAST    ORDERING PROVIDER:  JOSHUA NANCE    CLINICAL HISTORY:  Pain on the left side    COMPARISON STUDY:  Radiograph of the same date    TECHNIQUE:  A multislice scan was obtained of the pelvic girdle and left hip  in the axial plane without IV contrast. Reformatted images were  generated in the sagittal and coronal planes.   This exam was performed according to our departmental  dose-optimization program, which includes automated exposure  control, adjustment of the mA and/or kV according to the  patient's size and/or use of iterative reconstruction technique.     FINDINGS:  Discontinuity of the cortex of the base of the femoral neck on  axial image 61 along both the lateral and medial margin, with a  subtle lucency visualized on coronal image 35, and axial image  64, consistent with nondisplaced fracture of the left proximal  femur along the intratrochanteric region.    Buckling of the left inferior pubic ramus on axial image 66, and  along the lateral aspect of the left superior pubic ramus on  axial image 49 and this is due to fracture of indeterminate age.  Coexisting fracture deformity of the left lateral sacral ala on  axial image 29.    There is significant distention of the urinary bladder, and this  patient may benefit from catheterization of the urinary bladder  for decompression.      Impression:      Acute nondisplaced  fracture of the left proximal femur along the  intratrochanteric region.  Fracture deformity of indeterminate age involving the left pubic  rami and lateral aspect of the left sacral ala.  There is significant distention of the urinary bladder, and this  patient may benefit from catheterization of the urinary bladder  for decompression.    Electronically signed by:  Panfilo Horton MD  12/1/2022 8:50 PM CST  Workstation: [id number]    XR Foot 3+ View Left [[id number]] Collected: 12/01/22 2007     Updated: 12/01/22 2038    Narrative:      XR FOOT 3 OR MORE VIEWS    COMPARISONS: None.    ADDITIONAL PERTINENT HISTORY: Fall with pain    FINDINGS:       Osseous structures: Mild diffuse osteopenia. No bony fractures.    Joint spaces: Negative.    Surrounding soft tissues: Negative.      Impression:      1. Diffuse osteopenia.  2. No acute appearing bony abnormalities.    Electronically signed by:  Darin Michel MD  12/1/2022 8:35 PM CST  Workstation: [id number]    XR Hip With or Without Pelvis 2 - 3 View Left [[id number]] Collected: 12/01/22 1815     Updated: 12/01/22 1844    Narrative:      EXAM DESCRIPTION: XR HIP W OR WO PELVIS 2-3 VIEW LEFT 12/1/2022  6:15 PM CST    CLINICAL HISTORY:Fall, injury    COMPARISON: None.    TECHNIQUE:     3 views of the left hip.     FINDINGS:     Osteopenia.    No acute fracture or traumatic malalignment.    Postsurgical changes of the right acetabulum and superior pubic  ramus.    Surgical clips projecting over the left pelvis.    Pelvic phleboliths.       Impression:      Osteopenia. No acute fracture or traumatic malalignment.    Electronically signed by:  Meir Vu MD  12/1/2022 6:42 PM  CST Workstation: [id number]            Assessment:    Active Hospital Problems    Diagnosis    • **Closed fracture of proximal end of left femur, initial encounter ([facility])      # Mechanical fall  # Left hip fracture secondary to fall  # Urinary retention is status post catheter placed  # Dementia  # History  of coronary artery disease, myocardial infarction and stent placement  # Hypertension,, uncontrolled  # History of hyperlipidemia   # Baseline unsteady gait  # Possible history of COPD considering list of home medication          Plan:    Placed on telemetry  No laboratory work-up available.  We will obtain a stat CBC CMP PT/INR  Obtain further laboratory work-up  Obtain UA and if UA abnormal urine culture.  If UA significantly abnormal placed on antibiotic.  Continue with Godwin catheter.  Obtain EKG  Obtain echocardiogram  Analgesics as needed laxative as needed,   N.p.o. past midnight pending orthopedic service evaluation  Placed on low rate IV fluid upon availability of laboratory work-up  Avoid polypharmacy excessive use of sedative narcotics, medication with significant anticholinergic effect considering dementia  Placed on labetalol as needed for significantly elevated blood pressures.  Avoid the strict blood pressure control.  See response of uncontrolled hypertension to Godwin catheter placement and resolution of urinary retention  PT OT eval will be obtained following orthopedic evaluation and potential surgical intervention  Comorbidities, chronic medical problems will be treated appropriately  DVT and GI prophylaxis will be initiated  Reconcile home medication continue with essential home medications.  Please see orders for comprehensive plan  Prognosis guarded.  I discussed medical care with the ED provider Paloma.    I confirmed that the patient's Advance Care Plan is present, code status is documented, or surrogate decision maker is listed in the patient's medical record.   Patient cannot contribute to discussion about CODE STATUS and advanced directive by presence of dementia.  Per his wife Gita patient has advanced directive and she was not sure about patient's wishes in regard of CODE STATUS.  She decided for full code for mediate care ending further decision making by his wife and review of his  advanced directive form.  I have utilized all available immediate resources to obtain, update, or review the patient's current medications.     I discussed the patient's findings and my recommendations with: Patient's wife and she agreed with above plan of care.      Saeid Behroozi, MD   12/01/22   21:35 CST

## 2022-12-02 NOTE — ANESTHESIA PREPROCEDURE EVALUATION
" Anesthesia Evaluation     Patient summary reviewed and Nursing notes reviewed   no history of anesthetic complications:  NPO Solid Status: > 8 hours  NPO Liquid Status: > 8 hours           Airway   Mallampati: II  TM distance: >3 FB  Neck ROM: full  possible difficult intubation  Dental    (+) edentulous    Pulmonary     breath sounds clear to auscultation  (+) a smoker Former, COPD moderate, decreased breath sounds,   (-) shortness of breath  Cardiovascular     ECG reviewed  Rhythm: regular  Rate: normal    (+) hypertension, past MI  >12 months, CAD, hyperlipidemia,   (-) angina, murmur, cardiac stents, CABG    ROS comment: Normal sinus rhythm with sinus arrhythmia  Right superior axis deviation  Nonspecific intraventricular block  Abnormal ECG  When compared with ECG of 11-OCT-2022 14:36,  Questionable change in QRS axis  T wave inversion now evident in Inferior leads  T wave inversion less evident in Lateral leads    Neuro/Psych  (+) dementia, poor historian.,    GI/Hepatic/Renal/Endo - negative ROS     Musculoskeletal         ROS comment: Patient confused. Mechanical fall.   Abdominal    Substance History - negative use     OB/GYN negative ob/gyn ROS         Other   arthritis,      ROS/Med Hx Other: Godwin in place;blood tinged secondary to \"pulling\" catheter out and then replaced this morning. Patient in soft restraints.                  Anesthesia Plan    ASA 4 - emergent     general     (Discussed anesthesia plan with wife Gita @ [phone];[phone] who understands possible complications, risks and agrees. )  intravenous induction     Anesthetic plan, risks, benefits, and alternatives have been provided, discussed and informed consent has been obtained with: spouse/significant other, legal guardian and healthcare power of .  Pre-procedure education provided  Use of blood products discussed with spouse/significant other  Consented to blood products.   Plan discussed with CRNA.        CODE STATUS:  "   Level Of Support Discussed With: Next of Kin (If No Surrogate)  Code Status (Patient has no pulse and is not breathing): CPR (Attempt to Resuscitate)  Medical Interventions (Patient has pulse or is breathing): Full Support

## 2022-12-02 NOTE — ED NOTES
Patient is at an increased risk for falls. Stretcher placed in lowest position, wheels locked, and side rails up x 2. Call light placed within reach. Yellow bracelet and non skid socks placed on patient. Yellow light indicating increased risk for falls turned on above patient's door.

## 2022-12-02 NOTE — ADDENDUM NOTE
Addendum  created 12/02/22 1305 by Alka Caruso CRNA    Order list changed, Pharmacy for encounter modified

## 2022-12-02 NOTE — CONSULTS
Patient Name:  Teofilo Brown  Admit Date:  12/1/2022  Consult Date:  12/2/2022      Referring Provider: ER  Reason for Consultation: Left hip fracture    Patient Care Team:  Nicole Burgos APRN as PCP - General (Urgent Care)    Chief complaint: Left hip pain    Subjective .     History of present illness:  88 yo male, medically complex who sustained a ground level fall yesterday.  Imaging in the ER showed a left intertrochanteric hip fracture, and he was admitted to medicine overnight.  This appears to be an isolated injury.  History is very limited due to patient's underlying dementia.    Review of Systems:  Pertinent items are noted in HPI  All other systems reviewed as negative    History  Past Medical History:   Diagnosis Date   • Dementia (HCC)    • Hyperlipidemia    • Myocardial infarction (HCC)        Objective     Vital Signs   Temp:  [97.6 °F (36.4 °C)] 97.6 °F (36.4 °C)  Heart Rate:  [78-91] 85  Resp:  [18-20] 18  BP: (130-197)/(62-93) 130/66    Physical Exam:   In general the patient is lying on a stretcher.  He is in no acute distress.     Focused exam of the left lower extremity reveals that the leg is slightly externally rotated.  His foot appears warm and well perfused.  He is witnessed to dorsiflex and plantarflex the foot. Skin appears grossly intact.  Remainder of exam is limited secondary to the patient's dementia.    Results Review:    Imaging Results (Last 24 Hours)     Procedure Component Value Units Date/Time    XR Femur 2 View Left [[id number]] Collected: 12/01/22 2156     Updated: 12/01/22 2227    Narrative:      Left femur x-ray two views on 12/1/2022    CLINICAL INDICATION: Left intertrochanteric femur fracture    COMPARISON: Left hip x-ray from 12/1/2022 and CT pelvis from  12/1/2022    FINDINGS: There is an acute, oblique, nondisplaced left  intertrochanteric femur fracture. There is diffuse osteopenia. No  other fracture is noted. Vascular calcifications are noted.  Visualized  joints are well aligned.      Impression:      Acute left intertrochanteric femur fracture.    Electronically signed by:  Noel Arias  12/1/2022 10:25 PM  CST Workstation: [id number]    CT Pelvis Without Contrast [[id number]] Collected: 12/01/22 1941     Updated: 12/01/22 2053    Narrative:      EXAM:  CT PELVIS WITHOUT IV CONTRAST    ORDERING PROVIDER:  JOSHUA NANCE    CLINICAL HISTORY:  Pain on the left side    COMPARISON STUDY:  Radiograph of the same date    TECHNIQUE:  A multislice scan was obtained of the pelvic girdle and left hip  in the axial plane without IV contrast. Reformatted images were  generated in the sagittal and coronal planes.   This exam was performed according to our departmental  dose-optimization program, which includes automated exposure  control, adjustment of the mA and/or kV according to the  patient's size and/or use of iterative reconstruction technique.     FINDINGS:  Discontinuity of the cortex of the base of the femoral neck on  axial image 61 along both the lateral and medial margin, with a  subtle lucency visualized on coronal image 35, and axial image  64, consistent with nondisplaced fracture of the left proximal  femur along the intratrochanteric region.    Buckling of the left inferior pubic ramus on axial image 66, and  along the lateral aspect of the left superior pubic ramus on  axial image 49 and this is due to fracture of indeterminate age.  Coexisting fracture deformity of the left lateral sacral ala on  axial image 29.    There is significant distention of the urinary bladder, and this  patient may benefit from catheterization of the urinary bladder  for decompression.      Impression:      Acute nondisplaced fracture of the left proximal femur along the  intratrochanteric region.  Fracture deformity of indeterminate age involving the left pubic  rami and lateral aspect of the left sacral ala.  There is significant distention of the urinary bladder, and  this  patient may benefit from catheterization of the urinary bladder  for decompression.    Electronically signed by:  Panfilo Horton MD  12/1/2022 8:50 PM CST  Workstation: [id number]    XR Foot 3+ View Left [[id number]] Collected: 12/01/22 2007     Updated: 12/01/22 2038    Narrative:      XR FOOT 3 OR MORE VIEWS    COMPARISONS: None.    ADDITIONAL PERTINENT HISTORY: Fall with pain    FINDINGS:       Osseous structures: Mild diffuse osteopenia. No bony fractures.    Joint spaces: Negative.    Surrounding soft tissues: Negative.      Impression:      1. Diffuse osteopenia.  2. No acute appearing bony abnormalities.    Electronically signed by:  Darin Michel MD  12/1/2022 8:35 PM CST  Workstation: [id number]    XR Hip With or Without Pelvis 2 - 3 View Left [[id number]] Collected: 12/01/22 1815     Updated: 12/01/22 1844    Narrative:      EXAM DESCRIPTION: XR HIP W OR WO PELVIS 2-3 VIEW LEFT 12/1/2022  6:15 PM CST    CLINICAL HISTORY:Fall, injury    COMPARISON: None.    TECHNIQUE:     3 views of the left hip.     FINDINGS:     Osteopenia.    No acute fracture or traumatic malalignment.    Postsurgical changes of the right acetabulum and superior pubic  ramus.    Surgical clips projecting over the left pelvis.    Pelvic phleboliths.       Impression:      Osteopenia. No acute fracture or traumatic malalignment.    Electronically signed by:  Meir Vu MD  12/1/2022 6:42 PM  CST Workstation: [id number]          Lab Results (last 24 hours)     Procedure Component Value Units Date/Time    TSH [[id number]]  (Normal) Collected: 12/02/22 0708    Specimen: Blood Updated: 12/02/22 0804     TSH 2.640 uIU/mL     Vitamin D,25-Hydroxy [[id number]] Collected: 12/02/22 0708    Specimen: Blood Updated: 12/02/22 0803    Vitamin B12 [[id number]] Collected: 12/02/22 0708    Specimen: Blood Updated: 12/02/22 0803    Folate [[id number]] Collected: 12/02/22 0708    Specimen: Blood Updated: 12/02/22 0803    Comprehensive Metabolic Panel  [[id number]]  (Abnormal) Collected: 12/02/22 0708    Specimen: Blood Updated: 12/02/22 0759     Glucose 156 mg/dL      BUN 20 mg/dL      Creatinine 1.15 mg/dL      Sodium 143 mmol/L      Potassium 3.3 mmol/L      Chloride 107 mmol/L      CO2 22.0 mmol/L      Calcium 9.6 mg/dL      Total Protein 6.5 g/dL      Albumin 4.10 g/dL      ALT (SGPT) 14 U/L      AST (SGOT) 21 U/L      Alkaline Phosphatase 93 U/L      Total Bilirubin 0.9 mg/dL      Globulin 2.4 gm/dL      A/G Ratio 1.7 g/dL      BUN/Creatinine Ratio 17.4     Anion Gap 14.0 mmol/L      eGFR 61.6 mL/min/1.73      Comment: National Kidney Foundation and American Society of Nephrology (ASN) Task Force recommended calculation based on the Chronic Kidney Disease Epidemiology Collaboration (CKD-EPI) equation refit without adjustment for race.       Narrative:      GFR Normal >60  Chronic Kidney Disease <60  Kidney Failure <15    The GFR formula is only valid for adults with stable renal function between ages 18 and 70.    Magnesium [[id number]]  (Abnormal) Collected: 12/02/22 0708    Specimen: Blood Updated: 12/02/22 0759     Magnesium 1.5 mg/dL     Hemoglobin A1c [[id number]]  (Normal) Collected: 12/02/22 0708    Specimen: Blood Updated: 12/02/22 0750     Hemoglobin A1C 5.50 %     Narrative:      Hemoglobin A1C Ranges:    Increased Risk for Diabetes  5.7% to 6.4%  Diabetes                     >= 6.5%  Diabetic Goal                < 7.0%    CBC (No Diff) [[id number]]  (Abnormal) Collected: 12/02/22 0708    Specimen: Blood Updated: 12/02/22 0740     WBC 11.06 10*3/mm3      RBC 4.58 10*6/mm3      Hemoglobin 14.5 g/dL      Hematocrit 42.3 %      MCV 92.4 fL      MCH 31.7 pg      MCHC 34.3 g/dL      RDW 12.2 %      RDW-SD 41.4 fl      MPV 10.2 fL      Platelets 145 10*3/mm3     Protime-INR [[id number]]  (Abnormal) Collected: 12/01/22 2338    Specimen: Blood Updated: 12/02/22 0021     Protime 15.3 Seconds      INR 1.21    Narrative:      Therapeutic range for most indications  is 2.0-3.0 INR,  or 2.5-3.5 for mechanical heart valves.    Comprehensive Metabolic Panel [[id number]]  (Abnormal) Collected: 12/01/22 2145    Specimen: Blood Updated: 12/01/22 2238     Glucose 158 mg/dL      BUN 20 mg/dL      Creatinine 1.16 mg/dL      Sodium 142 mmol/L      Potassium 3.5 mmol/L      Chloride 106 mmol/L      CO2 22.0 mmol/L      Calcium 9.6 mg/dL      Total Protein 6.5 g/dL      Albumin 4.20 g/dL      ALT (SGPT) 15 U/L      AST (SGOT) 21 U/L      Alkaline Phosphatase 99 U/L      Total Bilirubin 0.6 mg/dL      Globulin 2.3 gm/dL      A/G Ratio 1.8 g/dL      BUN/Creatinine Ratio 17.2     Anion Gap 14.0 mmol/L      eGFR 61.0 mL/min/1.73      Comment: National Kidney Foundation and American Society of Nephrology (ASN) Task Force recommended calculation based on the Chronic Kidney Disease Epidemiology Collaboration (CKD-EPI) equation refit without adjustment for race.       Narrative:      GFR Normal >60  Chronic Kidney Disease <60  Kidney Failure <15    The GFR formula is only valid for adults with stable renal function between ages 18 and 70.    CBC & Differential [[id number]]  (Abnormal) Collected: 12/01/22 2145    Specimen: Blood Updated: 12/01/22 2227    Narrative:      The following orders were created for panel order CBC & Differential.  Procedure                               Abnormality         Status                     ---------                               -----------         ------                     CBC Auto Differential[[id number]]        Abnormal            Final result                 Please view results for these tests on the individual orders.    CBC Auto Differential [[id number]]  (Abnormal) Collected: 12/01/22 2145    Specimen: Blood Updated: 12/01/22 2227     WBC 11.40 10*3/mm3      RBC 4.60 10*6/mm3      Hemoglobin 14.6 g/dL      Hematocrit 42.0 %      MCV 91.3 fL      MCH 31.7 pg      MCHC 34.8 g/dL      RDW 12.3 %      RDW-SD 40.7 fl      MPV 10.3 fL      Platelets 150 10*3/mm3       Neutrophil % 75.5 %      Lymphocyte % 15.6 %      Monocyte % 7.2 %      Eosinophil % 0.5 %      Basophil % 0.3 %      Immature Grans % 0.9 %      Neutrophils, Absolute 8.61 10*3/mm3      Lymphocytes, Absolute 1.78 10*3/mm3      Monocytes, Absolute 0.82 10*3/mm3      Eosinophils, Absolute 0.06 10*3/mm3      Basophils, Absolute 0.03 10*3/mm3      Immature Grans, Absolute 0.10 10*3/mm3      nRBC 0.0 /100 WBC     Urinalysis With Microscopic If Indicated (No Culture) - Urine, Clean Catch [[id number]]  (Abnormal) Collected: 12/01/22 2129    Specimen: Urine, Clean Catch Updated: 12/01/22 2136     Color, UA Yellow     Appearance, UA Clear     pH, UA 7.0     Specific Gravity, UA 1.015     Glucose, UA Negative     Ketones, UA Negative     Bilirubin, UA Negative     Blood, UA Negative     Protein, UA Trace     Leuk Esterase, UA Negative     Nitrite, UA Negative     Urobilinogen, UA 0.2 E.U./dL    Narrative:      Urine microscopic not indicated.           I reviewed the patient's new clinical results.  I reviewed the patient's new imaging results and agree with the interpretation.      Assessment & Plan       Closed fracture of proximal end of left femur, initial encounter (HCC)      87 year old medically complex male who presents with a left intertrochanteric hip fracture    Plan:  I discussed the diagnosis and treatment plan with the patient.  Specifically, this is a fracture that requires operative stabilization.  If we do not stabilize the fracture, the patient will remain bed bound with a high risk of morbidity and mortality.  Surgical risks are also high in a patient at his age and with his medical comorbidities, but is necessary if he is going to be able to get out of bed.  I will discuss the case further with his wife when she arrives.  I did speak with her over the phone.  Risks of surgery were discussed to include infection, pain, bleeding, scar, damage to surrounding structures, failure of repair, non-union,  mal-union, failure of hardware, risk of blood transfusion, as well as general risks of surgery to include death.  Again, this is a high risk surgical patient in general with not an insignificant risk of perioperative morbidity and mortality.      I discussed the patients findings and my recommendations with patient and family    Dino Danielle MD  12/02/22  09:44 CST    Time: More than 50% of time spent in counseling and coordination of care:  Total face-to-face/floor time 60 min.  Time spent in counseling 30 min. Counseling included the following topics: surgery and associated risks

## 2022-12-02 NOTE — CONSULTS
[facility]   Consult Note    Patient Name: Teofilo Brown  : 1935  MRN: [id number]  Primary Care Physician:  Nicole Burgos APRN  Referring Physician: Fuentes Hilliard DO  Date of admission: 2022    Inpatient Urology Consult  Consult performed by: Rhea Velasquez APRN  Consult ordered by: Jordy Fernandez MD        Subjective   Subjective     Reason for Consult/ Chief Complaint: hematuria    History of Present Illness  Teofilo Brown is a 87 y.o. male admitted for left femur fracture which was repaired today. Godwin was anchored and hematuria has been present since, some clots present but no clot retention. He is confused, history of dementia. Takes aspirin outpatient, taking aspirin and Lovenox here. Appears comfort, no acute pain. No  meds at home.     Review of Systems   Unable to perform ROS: Mental status change        Personal History     Past Medical History:   Diagnosis Date   • Dementia (HCC)    • Hyperlipidemia    • Myocardial infarction (HCC)        Past Surgical History:   Procedure Laterality Date   • CAROTID STENT         Family History: family history is not on file. Otherwise pertinent FHx was reviewed and not pertinent to current issue.    Social History:  reports that he has never smoked. He does not have any smokeless tobacco history on file. He reports that he does not currently use alcohol. He reports that he does not use drugs.    Home Medications:   Vitamin D (Cholecalciferol), albuterol sulfate HFA, aspirin, atorvastatin, escitalopram, fluticasone-salmeterol, multivitamin with minerals, nitroglycerin, and rivastigmine    Allergies:  Allergies   Allergen Reactions   • Morphine Unknown - High Severity   • Tramadol Unknown - High Severity       Objective    Objective     Vitals:  Temp:  [97 °F (36.1 °C)-98 °F (36.7 °C)] 97.6 °F (36.4 °C)  Heart Rate:  [77-91] 82  Resp:  [18-20] 18  BP: (130-197)/(60-93) 170/78  Flow (L/min):  [2] 2    Physical Exam  Constitutional:        General: He is not in acute distress.     Appearance: He is ill-appearing.   HENT:      Head: Normocephalic.      Right Ear: External ear normal.      Left Ear: External ear normal.      Mouth/Throat:      Mouth: Mucous membranes are dry.   Eyes:      General: No scleral icterus.        Right eye: No discharge.         Left eye: No discharge.      Pupils: Pupils are equal, round, and reactive to light.   Cardiovascular:      Rate and Rhythm: Normal rate.      Pulses: Normal pulses.   Pulmonary:      Effort: Pulmonary effort is normal. No respiratory distress.   Abdominal:      General: There is no distension.      Palpations: Abdomen is soft.      Tenderness: There is no abdominal tenderness.   Genitourinary:     Comments: Godwin dark pink urine, a few clots present.  Musculoskeletal:         General: No swelling, tenderness or deformity.   Skin:     General: Skin is warm and dry.      Capillary Refill: Capillary refill takes less than 2 seconds.      Coloration: Skin is pale.   Neurological:      Mental Status: He is alert. He is disoriented.         Result Review    Result Review:  I have personally reviewed the results from the time of this admission to 12/2/2022 17:08 CST and agree with these findings:  [x]  Laboratory list / accordion  [x]  Microbiology  [x]  Radiology  []  EKG/Telemetry   []  Cardiology/Vascular   []  Pathology  []  Old records  []  Other:  Most notable findings include:    Imaging Results (Last 7 Days)     Procedure Component Value Units Date/Time    FL C Arm During Surgery [[id number]] Resulted: 12/02/22 1331     Updated: 12/02/22 1331    XR Femur 2 View Left [[id number]] Collected: 12/01/22 2156     Updated: 12/01/22 2227    Narrative:      Left femur x-ray two views on 12/1/2022    CLINICAL INDICATION: Left intertrochanteric femur fracture    COMPARISON: Left hip x-ray from 12/1/2022 and CT pelvis from  12/1/2022    FINDINGS: There is an acute, oblique, nondisplaced left  intertrochanteric  femur fracture. There is diffuse osteopenia. No  other fracture is noted. Vascular calcifications are noted.  Visualized joints are well aligned.      Impression:      Acute left intertrochanteric femur fracture.    Electronically signed by:  Noel Arias  12/1/2022 10:25 PM  [facility] Workstation: [id number]    CT Pelvis Without Contrast [[id number]] Collected: 12/01/22 1941     Updated: 12/01/22 2053    Narrative:      EXAM:  CT PELVIS WITHOUT IV CONTRAST    ORDERING PROVIDER:  JOSHUA NANCE    CLINICAL HISTORY:  Pain on the left side    COMPARISON STUDY:  Radiograph of the same date    TECHNIQUE:  A multislice scan was obtained of the pelvic girdle and left hip  in the axial plane without IV contrast. Reformatted images were  generated in the sagittal and coronal planes.   This exam was performed according to our departmental  dose-optimization program, which includes automated exposure  control, adjustment of the mA and/or kV according to the  patient's size and/or use of iterative reconstruction technique.     FINDINGS:  Discontinuity of the cortex of the base of the femoral neck on  axial image 61 along both the lateral and medial margin, with a  subtle lucency visualized on coronal image 35, and axial image  64, consistent with nondisplaced fracture of the left proximal  femur along the intratrochanteric region.    Buckling of the left inferior pubic ramus on axial image 66, and  along the lateral aspect of the left superior pubic ramus on  axial image 49 and this is due to fracture of indeterminate age.  Coexisting fracture deformity of the left lateral sacral ala on  axial image 29.    There is significant distention of the urinary bladder, and this  patient may benefit from catheterization of the urinary bladder  for decompression.      Impression:      Acute nondisplaced fracture of the left proximal femur along the  intratrochanteric region.  Fracture deformity of indeterminate age involving the left  pubic  rami and lateral aspect of the left sacral ala.  There is significant distention of the urinary bladder, and this  patient may benefit from catheterization of the urinary bladder  for decompression.    Electronically signed by:  Panfilo Horton MD  12/1/2022 8:50 PM CST  Workstation: [id number]    XR Foot 3+ View Left [[id number]] Collected: 12/01/22 2007     Updated: 12/01/22 2038    Narrative:      XR FOOT 3 OR MORE VIEWS    COMPARISONS: None.    ADDITIONAL PERTINENT HISTORY: Fall with pain    FINDINGS:       Osseous structures: Mild diffuse osteopenia. No bony fractures.    Joint spaces: Negative.    Surrounding soft tissues: Negative.      Impression:      1. Diffuse osteopenia.  2. No acute appearing bony abnormalities.    Electronically signed by:  Darin Michel MD  12/1/2022 8:35 PM CST  Workstation: [id number]    XR Hip With or Without Pelvis 2 - 3 View Left [[id number]] Collected: 12/01/22 1815     Updated: 12/01/22 1844    Narrative:      EXAM DESCRIPTION: XR HIP W OR WO PELVIS 2-3 VIEW LEFT 12/1/2022  6:15 PM CST    CLINICAL HISTORY:Fall, injury    COMPARISON: None.    TECHNIQUE:     3 views of the left hip.     FINDINGS:     Osteopenia.    No acute fracture or traumatic malalignment.    Postsurgical changes of the right acetabulum and superior pubic  ramus.    Surgical clips projecting over the left pelvis.    Pelvic phleboliths.       Impression:      Osteopenia. No acute fracture or traumatic malalignment.    Electronically signed by:  Meir Vu MD  12/1/2022 6:42 PM  [facility]ST Workstation: [id number]        Lab Results (last 72 hours)     Procedure Component Value Units Date/Time    Hemoglobin & Hematocrit, Blood [[id number]]  (Normal) Collected: 12/02/22 1318    Specimen: Blood Updated: 12/02/22 1327     Hemoglobin 14.4 g/dL      Hematocrit 42.6 %     Vitamin D,25-Hydroxy [[id number]]  (Normal) Collected: 12/02/22 0708    Specimen: Blood Updated: 12/02/22 1313     25 Hydroxy, Vitamin D 36.4 ng/ml      Narrative:      Reference Range for Total Vitamin D 25(OH)     Deficiency <20.0 ng/mL   Insufficiency 21-29 ng/mL   Sufficiency  ng/mL  Toxicity >100 ng/ml    Results may be falsely increased if patient taking Biotin.      Folate [[id number]]  (Normal) Collected: 12/02/22 0708    Specimen: Blood Updated: 12/02/22 1313     Folate 8.81 ng/mL     Narrative:      Results may be falsely increased if patient taking Biotin.      Vitamin B12 [[id number]]  (Normal) Collected: 12/02/22 0708    Specimen: Blood Updated: 12/02/22 1313     Vitamin B-12 325 pg/mL     Narrative:      Results may be falsely increased if patient taking Biotin.      TSH [[id number]]  (Normal) Collected: 12/02/22 0708    Specimen: Blood Updated: 12/02/22 0804     TSH 2.640 uIU/mL     Comprehensive Metabolic Panel [[id number]]  (Abnormal) Collected: 12/02/22 0708    Specimen: Blood Updated: 12/02/22 0759     Glucose 156 mg/dL      BUN 20 mg/dL      Creatinine 1.15 mg/dL      Sodium 143 mmol/L      Potassium 3.3 mmol/L      Chloride 107 mmol/L      CO2 22.0 mmol/L      Calcium 9.6 mg/dL      Total Protein 6.5 g/dL      Albumin 4.10 g/dL      ALT (SGPT) 14 U/L      AST (SGOT) 21 U/L      Alkaline Phosphatase 93 U/L      Total Bilirubin 0.9 mg/dL      Globulin 2.4 gm/dL      A/G Ratio 1.7 g/dL      BUN/Creatinine Ratio 17.4     Anion Gap 14.0 mmol/L      eGFR 61.6 mL/min/1.73      Comment: National Kidney Foundation and American Society of Nephrology (ASN) Task Force recommended calculation based on the Chronic Kidney Disease Epidemiology Collaboration (CKD-EPI) equation refit without adjustment for race.       Narrative:      GFR Normal >60  Chronic Kidney Disease <60  Kidney Failure <15    The GFR formula is only valid for adults with stable renal function between ages 18 and 70.    Magnesium [[id number]]  (Abnormal) Collected: 12/02/22 0708    Specimen: Blood Updated: 12/02/22 0759     Magnesium 1.5 mg/dL     Hemoglobin A1c [[id number]]  (Normal)  Collected: 12/02/22 0708    Specimen: Blood Updated: 12/02/22 0750     Hemoglobin A1C 5.50 %     Narrative:      Hemoglobin A1C Ranges:    Increased Risk for Diabetes  5.7% to 6.4%  Diabetes                     >= 6.5%  Diabetic Goal                < 7.0%    CBC (No Diff) [[id number]]  (Abnormal) Collected: 12/02/22 0708    Specimen: Blood Updated: 12/02/22 0740     WBC 11.06 10*3/mm3      RBC 4.58 10*6/mm3      Hemoglobin 14.5 g/dL      Hematocrit 42.3 %      MCV 92.4 fL      MCH 31.7 pg      MCHC 34.3 g/dL      RDW 12.2 %      RDW-SD 41.4 fl      MPV 10.2 fL      Platelets 145 10*3/mm3     Protime-INR [[id number]]  (Abnormal) Collected: 12/01/22 2338    Specimen: Blood Updated: 12/02/22 0021     Protime 15.3 Seconds      INR 1.21    Narrative:      Therapeutic range for most indications is 2.0-3.0 INR,  or 2.5-3.5 for mechanical heart valves.    Comprehensive Metabolic Panel [[id number]]  (Abnormal) Collected: 12/01/22 2145    Specimen: Blood Updated: 12/01/22 2238     Glucose 158 mg/dL      BUN 20 mg/dL      Creatinine 1.16 mg/dL      Sodium 142 mmol/L      Potassium 3.5 mmol/L      Chloride 106 mmol/L      CO2 22.0 mmol/L      Calcium 9.6 mg/dL      Total Protein 6.5 g/dL      Albumin 4.20 g/dL      ALT (SGPT) 15 U/L      AST (SGOT) 21 U/L      Alkaline Phosphatase 99 U/L      Total Bilirubin 0.6 mg/dL      Globulin 2.3 gm/dL      A/G Ratio 1.8 g/dL      BUN/Creatinine Ratio 17.2     Anion Gap 14.0 mmol/L      eGFR 61.0 mL/min/1.73      Comment: National Kidney Foundation and American Society of Nephrology (ASN) Task Force recommended calculation based on the Chronic Kidney Disease Epidemiology Collaboration (CKD-EPI) equation refit without adjustment for race.       Narrative:      GFR Normal >60  Chronic Kidney Disease <60  Kidney Failure <15    The GFR formula is only valid for adults with stable renal function between ages 18 and 70.    CBC & Differential [[id number]]  (Abnormal) Collected: 12/01/22 2145     Specimen: Blood Updated: 12/01/22 2227    Narrative:      The following orders were created for panel order CBC & Differential.  Procedure                               Abnormality         Status                     ---------                               -----------         ------                     CBC Auto Differential[[id number]]        Abnormal            Final result                 Please view results for these tests on the individual orders.    CBC Auto Differential [[id number]]  (Abnormal) Collected: 12/01/22 2145    Specimen: Blood Updated: 12/01/22 2227     WBC 11.40 10*3/mm3      RBC 4.60 10*6/mm3      Hemoglobin 14.6 g/dL      Hematocrit 42.0 %      MCV 91.3 fL      MCH 31.7 pg      MCHC 34.8 g/dL      RDW 12.3 %      RDW-SD 40.7 fl      MPV 10.3 fL      Platelets 150 10*3/mm3      Neutrophil % 75.5 %      Lymphocyte % 15.6 %      Monocyte % 7.2 %      Eosinophil % 0.5 %      Basophil % 0.3 %      Immature Grans % 0.9 %      Neutrophils, Absolute 8.61 10*3/mm3      Lymphocytes, Absolute 1.78 10*3/mm3      Monocytes, Absolute 0.82 10*3/mm3      Eosinophils, Absolute 0.06 10*3/mm3      Basophils, Absolute 0.03 10*3/mm3      Immature Grans, Absolute 0.10 10*3/mm3      nRBC 0.0 /100 WBC     Urinalysis With Microscopic If Indicated (No Culture) - Urine, Clean Catch [[id number]]  (Abnormal) Collected: 12/01/22 2129    Specimen: Urine, Clean Catch Updated: 12/01/22 2136     Color, UA Yellow     Appearance, UA Clear     pH, UA 7.0     Specific Gravity, UA 1.015     Glucose, UA Negative     Ketones, UA Negative     Bilirubin, UA Negative     Blood, UA Negative     Protein, UA Trace     Leuk Esterase, UA Negative     Nitrite, UA Negative     Urobilinogen, UA 0.2 E.U./dL    Narrative:      Urine microscopic not indicated.            Assessment & Plan   Assessment / Plan     Brief Patient Summary:  Teofilo Brown is a 87 y.o. male who has hematuria after Godwin placement, cause is not known but could be from  urinary tract decompression. No urinary trauma reported. UA pre-op does not indicate UTI.     Active Hospital Problems:  Active Hospital Problems    Diagnosis    • **Closed fracture of proximal end of left femur, initial encounter ([facility])      Plan:   Keep Godwin, may manually irrigate as needed.   May need 3-way Godwin and CBI if hematuria does not clear or progresses to clot retention.  May need to hold aspirin/Lovenox if hematuria does not clear.   Start Flomax.   Renal ultrasound to evaluate hematuria.     DESMOND Ugarte

## 2022-12-02 NOTE — ED NOTES
Attempted to ambulate patient but patient began to scream out in pain attempting to sit on the edge of bed. ED provider made aware.

## 2022-12-03 ENCOUNTER — APPOINTMENT (OUTPATIENT)
Dept: CT IMAGING | Facility: HOSPITAL | Age: 87
End: 2022-12-03

## 2022-12-03 PROBLEM — R33.9 URINARY RETENTION: Status: ACTIVE | Noted: 2022-12-01

## 2022-12-03 LAB
ALBUMIN SERPL-MCNC: 3.6 G/DL (ref 3.5–5.2)
ALBUMIN/GLOB SERPL: 1.6 G/DL
ALP SERPL-CCNC: 80 U/L (ref 39–117)
ALT SERPL W P-5'-P-CCNC: 9 U/L (ref 1–41)
ANION GAP SERPL CALCULATED.3IONS-SCNC: 13 MMOL/L (ref 5–15)
AST SERPL-CCNC: 29 U/L (ref 1–40)
BASOPHILS # BLD AUTO: 0.02 10*3/MM3 (ref 0–0.2)
BASOPHILS NFR BLD AUTO: 0.2 % (ref 0–1.5)
BILIRUB SERPL-MCNC: 0.5 MG/DL (ref 0–1.2)
BUN SERPL-MCNC: 24 MG/DL (ref 8–23)
BUN/CREAT SERPL: 17.9 (ref 7–25)
CALCIUM SPEC-SCNC: 9.3 MG/DL (ref 8.6–10.5)
CHLORIDE SERPL-SCNC: 109 MMOL/L (ref 98–107)
CO2 SERPL-SCNC: 24 MMOL/L (ref 22–29)
CREAT SERPL-MCNC: 1.34 MG/DL (ref 0.76–1.27)
DEPRECATED RDW RBC AUTO: 42.9 FL (ref 37–54)
EGFRCR SERPLBLD CKD-EPI 2021: 51.3 ML/MIN/1.73
EOSINOPHIL # BLD AUTO: 0.03 10*3/MM3 (ref 0–0.4)
EOSINOPHIL NFR BLD AUTO: 0.3 % (ref 0.3–6.2)
ERYTHROCYTE [DISTWIDTH] IN BLOOD BY AUTOMATED COUNT: 12.6 % (ref 12.3–15.4)
GLOBULIN UR ELPH-MCNC: 2.3 GM/DL
GLUCOSE SERPL-MCNC: 119 MG/DL (ref 65–99)
HCT VFR BLD AUTO: 38.2 % (ref 37.5–51)
HGB BLD-MCNC: 13 G/DL (ref 13–17.7)
IMM GRANULOCYTES # BLD AUTO: 0.03 10*3/MM3 (ref 0–0.05)
IMM GRANULOCYTES NFR BLD AUTO: 0.3 % (ref 0–0.5)
LYMPHOCYTES # BLD AUTO: 1.25 10*3/MM3 (ref 0.7–3.1)
LYMPHOCYTES NFR BLD AUTO: 12.8 % (ref 19.6–45.3)
MCH RBC QN AUTO: 31.7 PG (ref 26.6–33)
MCHC RBC AUTO-ENTMCNC: 34 G/DL (ref 31.5–35.7)
MCV RBC AUTO: 93.2 FL (ref 79–97)
MONOCYTES # BLD AUTO: 0.92 10*3/MM3 (ref 0.1–0.9)
MONOCYTES NFR BLD AUTO: 9.4 % (ref 5–12)
NEUTROPHILS NFR BLD AUTO: 7.53 10*3/MM3 (ref 1.7–7)
NEUTROPHILS NFR BLD AUTO: 77 % (ref 42.7–76)
NRBC BLD AUTO-RTO: 0 /100 WBC (ref 0–0.2)
PLATELET # BLD AUTO: 110 10*3/MM3 (ref 140–450)
PMV BLD AUTO: 10.3 FL (ref 6–12)
POTASSIUM SERPL-SCNC: 3.6 MMOL/L (ref 3.5–5.2)
PROT SERPL-MCNC: 5.9 G/DL (ref 6–8.5)
RBC # BLD AUTO: 4.1 10*6/MM3 (ref 4.14–5.8)
SODIUM SERPL-SCNC: 146 MMOL/L (ref 136–145)
WBC NRBC COR # BLD: 9.78 10*3/MM3 (ref 3.4–10.8)

## 2022-12-03 PROCEDURE — 94799 UNLISTED PULMONARY SVC/PX: CPT

## 2022-12-03 PROCEDURE — 94664 DEMO&/EVAL PT USE INHALER: CPT

## 2022-12-03 PROCEDURE — 25010000002 CEFAZOLIN PER 500 MG: Performed by: FAMILY MEDICINE

## 2022-12-03 PROCEDURE — 94760 N-INVAS EAR/PLS OXIMETRY 1: CPT

## 2022-12-03 PROCEDURE — 80053 COMPREHEN METABOLIC PANEL: CPT | Performed by: FAMILY MEDICINE

## 2022-12-03 PROCEDURE — 70450 CT HEAD/BRAIN W/O DYE: CPT

## 2022-12-03 PROCEDURE — 25010000002 ENOXAPARIN PER 10 MG: Performed by: FAMILY MEDICINE

## 2022-12-03 PROCEDURE — 99024 POSTOP FOLLOW-UP VISIT: CPT | Performed by: STUDENT IN AN ORGANIZED HEALTH CARE EDUCATION/TRAINING PROGRAM

## 2022-12-03 PROCEDURE — 85025 COMPLETE CBC W/AUTO DIFF WBC: CPT | Performed by: FAMILY MEDICINE

## 2022-12-03 PROCEDURE — 25010000002 HALOPERIDOL LACTATE PER 5 MG

## 2022-12-03 RX ORDER — HALOPERIDOL 5 MG/ML
INJECTION INTRAMUSCULAR
Status: COMPLETED
Start: 2022-12-03 | End: 2022-12-03

## 2022-12-03 RX ORDER — HALOPERIDOL 5 MG/ML
2 INJECTION INTRAMUSCULAR ONCE
Status: COMPLETED | OUTPATIENT
Start: 2022-12-03 | End: 2022-12-03

## 2022-12-03 RX ORDER — IPRATROPIUM BROMIDE AND ALBUTEROL SULFATE 2.5; .5 MG/3ML; MG/3ML
3 SOLUTION RESPIRATORY (INHALATION)
Status: DISCONTINUED | OUTPATIENT
Start: 2022-12-03 | End: 2022-12-09 | Stop reason: HOSPADM

## 2022-12-03 RX ADMIN — ESCITALOPRAM 5 MG: 5 TABLET, FILM COATED ORAL at 10:39

## 2022-12-03 RX ADMIN — ENOXAPARIN SODIUM 40 MG: 100 INJECTION SUBCUTANEOUS at 10:39

## 2022-12-03 RX ADMIN — CEFAZOLIN 2 G: 10 INJECTION, POWDER, FOR SOLUTION INTRAVENOUS at 19:50

## 2022-12-03 RX ADMIN — CEFAZOLIN 2 G: 10 INJECTION, POWDER, FOR SOLUTION INTRAVENOUS at 02:18

## 2022-12-03 RX ADMIN — RIVASTIGMINE TRANSDERMAL SYSTEM 1 PATCH: 4.6 PATCH, EXTENDED RELEASE TRANSDERMAL at 10:39

## 2022-12-03 RX ADMIN — HALOPERIDOL LACTATE 2 MG: 5 INJECTION, SOLUTION INTRAMUSCULAR at 16:07

## 2022-12-03 RX ADMIN — CEFAZOLIN 2 G: 10 INJECTION, POWDER, FOR SOLUTION INTRAVENOUS at 14:02

## 2022-12-03 RX ADMIN — IPRATROPIUM BROMIDE AND ALBUTEROL SULFATE 3 ML: 2.5; .5 SOLUTION RESPIRATORY (INHALATION) at 14:24

## 2022-12-03 RX ADMIN — ACETAMINOPHEN 650 MG: 325 TABLET, FILM COATED ORAL at 10:39

## 2022-12-03 RX ADMIN — HALOPERIDOL 2 MG: 5 INJECTION INTRAMUSCULAR at 16:07

## 2022-12-03 RX ADMIN — IPRATROPIUM BROMIDE AND ALBUTEROL SULFATE 3 ML: 2.5; .5 SOLUTION RESPIRATORY (INHALATION) at 22:17

## 2022-12-03 NOTE — PLAN OF CARE
Goal Outcome Evaluation:               Vss, no distress, pt not cooperative and spit out half am meds this morning. Once released from restraints, pt begins to pull at mcconnell immediately and when redirected becomes combative. Pain controlled. Pt resting once left alone.

## 2022-12-03 NOTE — PROGRESS NOTES
"   LOS: 2 days   Patient Care Team:  Nicole Burgos APRN as PCP - General (Urgent Care)    Subjective     Subjective:  Symptoms:  Stable.    Diet:  No nausea or vomiting.        History taken from: patient chart    Objective     Vital Signs  Temp:  [96.4 °F (35.8 °C)-98.1 °F (36.7 °C)] 96.4 °F (35.8 °C)  Heart Rate:  [77-98] 80  Resp:  [18-20] 18  BP: (141-174)/(60-85) 156/76    Objective:  General Appearance:  In no acute distress.    Vital signs: (most recent): Blood pressure 156/76, pulse 80, temperature 96.4 °F (35.8 °C), temperature source Axillary, resp. rate 18, height 165.1 cm (65\"), weight 63.5 kg (139 lb 15.9 oz), SpO2 95 %.  Vital signs are normal.  No fever.    Output: Producing urine (Godwin CBI sahara urine).    Lungs:  Normal effort and normal respiratory rate.    Abdomen: Abdomen is soft and non-distended.  There is no abdominal tenderness.     Skin:  Warm, dry and pale.              Results Review:    Lab Results (last 24 hours)     Procedure Component Value Units Date/Time    Comprehensive Metabolic Panel [[id number]]  (Abnormal) Collected: 12/03/22 0701    Specimen: Blood Updated: 12/03/22 0803     Glucose 119 mg/dL      BUN 24 mg/dL      Creatinine 1.34 mg/dL      Sodium 146 mmol/L      Potassium 3.6 mmol/L      Chloride 109 mmol/L      CO2 24.0 mmol/L      Calcium 9.3 mg/dL      Total Protein 5.9 g/dL      Albumin 3.60 g/dL      ALT (SGPT) 9 U/L      AST (SGOT) 29 U/L      Alkaline Phosphatase 80 U/L      Total Bilirubin 0.5 mg/dL      Globulin 2.3 gm/dL      A/G Ratio 1.6 g/dL      BUN/Creatinine Ratio 17.9     Anion Gap 13.0 mmol/L      eGFR 51.3 mL/min/1.73      Comment: National Kidney Foundation and American Society of Nephrology (ASN) Task Force recommended calculation based on the Chronic Kidney Disease Epidemiology Collaboration (CKD-EPI) equation refit without adjustment for race.       Narrative:      GFR Normal >60  Chronic Kidney Disease <60  Kidney Failure <15    The GFR formula is " only valid for adults with stable renal function between ages 18 and 70.    CBC & Differential [[id number]]  (Abnormal) Collected: 12/03/22 0701    Specimen: Blood Updated: 12/03/22 0756    Narrative:      The following orders were created for panel order CBC & Differential.  Procedure                               Abnormality         Status                     ---------                               -----------         ------                     CBC Auto Differential[[id number]]        Abnormal            Final result               Scan Slide[[id number]]                                                                    Please view results for these tests on the individual orders.    CBC Auto Differential [[id number]]  (Abnormal) Collected: 12/03/22 0701    Specimen: Blood Updated: 12/03/22 0756     WBC 9.78 10*3/mm3      RBC 4.10 10*6/mm3      Hemoglobin 13.0 g/dL      Hematocrit 38.2 %      MCV 93.2 fL      MCH 31.7 pg      MCHC 34.0 g/dL      RDW 12.6 %      RDW-SD 42.9 fl      MPV 10.3 fL      Platelets 110 10*3/mm3      Neutrophil % 77.0 %      Lymphocyte % 12.8 %      Monocyte % 9.4 %      Eosinophil % 0.3 %      Basophil % 0.2 %      Immature Grans % 0.3 %      Neutrophils, Absolute 7.53 10*3/mm3      Lymphocytes, Absolute 1.25 10*3/mm3      Monocytes, Absolute 0.92 10*3/mm3      Eosinophils, Absolute 0.03 10*3/mm3      Basophils, Absolute 0.02 10*3/mm3      Immature Grans, Absolute 0.03 10*3/mm3      nRBC 0.0 /100 WBC     Hemoglobin & Hematocrit, Blood [[id number]]  (Normal) Collected: 12/02/22 1318    Specimen: Blood Updated: 12/02/22 1327     Hemoglobin 14.4 g/dL      Hematocrit 42.6 %     Vitamin D,25-Hydroxy [[id number]]  (Normal) Collected: 12/02/22 0708    Specimen: Blood Updated: 12/02/22 1313     25 Hydroxy, Vitamin D 36.4 ng/ml     Narrative:      Reference Range for Total Vitamin D 25(OH)     Deficiency <20.0 ng/mL   Insufficiency 21-29 ng/mL   Sufficiency  ng/mL  Toxicity >100 ng/ml    Results  may be falsely increased if patient taking Biotin.      Folate [[id number]]  (Normal) Collected: 12/02/22 0708    Specimen: Blood Updated: 12/02/22 1313     Folate 8.81 ng/mL     Narrative:      Results may be falsely increased if patient taking Biotin.      Vitamin B12 [[id number]]  (Normal) Collected: 12/02/22 0708    Specimen: Blood Updated: 12/02/22 1313     Vitamin B-12 325 pg/mL     Narrative:      Results may be falsely increased if patient taking Biotin.           Imaging Results (Last 24 Hours)     Procedure Component Value Units Date/Time    US Renal Bilateral [[id number]] Collected: 12/02/22 1732     Updated: 12/02/22 1859    Narrative:      EXAM:  US RETROPERITONEUM    ORDERING PROVIDER:  MERCEDEZ KEMP    CLINICAL HISTORY:    Hematuria    COMPARISON STUDY:      TECHNIQUE:      Transverse and longitudinal sonographic and Doppler imaging of  bilateral kidneys and urinary bladder was performed.    FINDINGS:        RIGHT KIDNEY:  10.5 x 4.1 x 5.8 cm. The right renal cortex  demonstrates unremarkable contour and increased cortical  echogenicity due to chronic medical renal disease. No  nephrolithiasis. Mild-to-moderate hydronephrosis. No solid mass.    LEFT  KIDNEY:  10.8 x 3.7 x 3.5 cm.  The left renal cortex  demonstrates unremarkable contour and increased cortical  echogenicity due to chronic medical renal disease. No  nephrolithiasis. Mild-to-moderate hydronephrosis. No solid mass.    URINARY BLADDER: Decompressed with Godwin catheter in place.      Impression:      There are bilateral mild-to-moderate hydronephrosis on both  sides.  Bilateral chronic medical renal disease with increased cortical  echogenicity on both sides.  Urinary bladder is decompressed with Godwin catheter.    Electronically signed by:  Panfilo Horton MD  12/2/2022 6:57 PM [facility]  Workstation: [id number]    FL C Arm During Surgery [[id number]] Resulted: 12/02/22 1331     Updated: 12/02/22 1331           I reviewed the patient's new clinical  results.  I reviewed the patient's new imaging results and agree with the interpretation.  I reviewed the patient's other test results and agree with the interpretation      Assessment & Plan       Closed fracture of proximal end of left femur, initial encounter ([facility])      Assessment & Plan    Consulted to Urology for hematuria, retention of urine. Urine is dark sahara today. Afebrile. Renal ultrasound shows bilateral hydronephrosis with bladder decompressed by Godwin.   Estimated Creatinine Clearance: 34.9 mL/min (A) (by C-G formula based on SCr of 1.34 mg/dL (H)).    Plan:  NPO after midnight for cystoscopy with Dr. Rahat Velasquez, DESMOND  12/03/22  09:20 CST

## 2022-12-03 NOTE — PROGRESS NOTES
ID: 88 yo male s/p left hip intramedullary nail for left intertrochanteric fracture on 12/2/2022    S: Patient resting comfortably.  He is confused, agitated.    PE  Lying in bed, appears comfortable  Focused exam of his left lower extremity reveals dressings which are clean and dry.  Skin otherwise intact.  Witnessed to dorsiflex and plantarflex ankle.  Foot is warm and well perfused.    Labs  12/3- Hct 38.2    A/P: 88 yo male s/p above    - He is weight bearing as tolerated and activity as tolerated to the left lower extremity with PT and OT  - Agree with Lovenox and SCDs.  Recommend chemoprophylaxis for 30 days post surgery  - May change dressings on POD2  - Prophylactic Ancef completed today  - He should follow up with the orthopaedic clinic approximately 2 weeks from his surgery date.  - Disposition pending

## 2022-12-03 NOTE — PLAN OF CARE
Goal Outcome Evaluation:               Patient is resting with the assistance of melatonin, when he is released from restraints he immediately begins pulling on catheter and iv and tries to get oob.

## 2022-12-03 NOTE — PROGRESS NOTES
[facility] Medicine Services  INPATIENT PROGRESS NOTE    Length of Stay: 2  Date of Admission: 12/1/2022  Primary Care Physician: Nicole Burgos APRN    Subjective   Chief Complaint: Confusion    HPI:    Still having confusion.  He is status post hip surgery.  Has been on supplemental oxygen.  Has been having some agitation.  Continues with Godwin catheter and urine is more clear today.    Review of Systems   Unable to perform ROS: Mental status change          All pertinent negatives and positives are as above. All other systems have been reviewed and are negative unless otherwise stated.     Objective    Temp:  [96 °F (35.6 °C)-98.1 °F (36.7 °C)] 96 °F (35.6 °C)  Heart Rate:  [77-98] 77  Resp:  [18-20] 18  BP: (144-174)/(66-85) 168/75  Physical Exam  Vitals and nursing note reviewed.   Constitutional:       General: He is not in acute distress.     Appearance: He is well-developed. He is not diaphoretic.   HENT:      Head: Normocephalic and atraumatic.   Cardiovascular:      Rate and Rhythm: Normal rate.   Pulmonary:      Effort: Pulmonary effort is normal. No respiratory distress.      Breath sounds: No wheezing.   Abdominal:      General: There is no distension.      Palpations: Abdomen is soft.   Musculoskeletal:         General: Normal range of motion.   Skin:     General: Skin is warm and dry.   Neurological:      Mental Status: He is alert.      Cranial Nerves: No cranial nerve deficit.   Psychiatric:      Comments: Confusion present             Results Review:  I have reviewed the labs, radiology results, and diagnostic studies.    Laboratory Data:   Lab Results (last 24 hours)     Procedure Component Value Units Date/Time    Comprehensive Metabolic Panel [[id number]]  (Abnormal) Collected: 12/03/22 0701    Specimen: Blood Updated: 12/03/22 0803     Glucose 119 mg/dL      BUN 24 mg/dL      Creatinine 1.34 mg/dL      Sodium 146 mmol/L      Potassium 3.6 mmol/L       Chloride 109 mmol/L      CO2 24.0 mmol/L      Calcium 9.3 mg/dL      Total Protein 5.9 g/dL      Albumin 3.60 g/dL      ALT (SGPT) 9 U/L      AST (SGOT) 29 U/L      Alkaline Phosphatase 80 U/L      Total Bilirubin 0.5 mg/dL      Globulin 2.3 gm/dL      A/G Ratio 1.6 g/dL      BUN/Creatinine Ratio 17.9     Anion Gap 13.0 mmol/L      eGFR 51.3 mL/min/1.73      Comment: National Kidney Foundation and American Society of Nephrology (ASN) Task Force recommended calculation based on the Chronic Kidney Disease Epidemiology Collaboration (CKD-EPI) equation refit without adjustment for race.       Narrative:      GFR Normal >60  Chronic Kidney Disease <60  Kidney Failure <15    The GFR formula is only valid for adults with stable renal function between ages 18 and 70.    CBC & Differential [[id number]]  (Abnormal) Collected: 12/03/22 0701    Specimen: Blood Updated: 12/03/22 0756    Narrative:      The following orders were created for panel order CBC & Differential.  Procedure                               Abnormality         Status                     ---------                               -----------         ------                     CBC Auto Differential[[id number]]        Abnormal            Final result               Scan Slide[[id number]]                                                                    Please view results for these tests on the individual orders.    CBC Auto Differential [[id number]]  (Abnormal) Collected: 12/03/22 0701    Specimen: Blood Updated: 12/03/22 0756     WBC 9.78 10*3/mm3      RBC 4.10 10*6/mm3      Hemoglobin 13.0 g/dL      Hematocrit 38.2 %      MCV 93.2 fL      MCH 31.7 pg      MCHC 34.0 g/dL      RDW 12.6 %      RDW-SD 42.9 fl      MPV 10.3 fL      Platelets 110 10*3/mm3      Neutrophil % 77.0 %      Lymphocyte % 12.8 %      Monocyte % 9.4 %      Eosinophil % 0.3 %      Basophil % 0.2 %      Immature Grans % 0.3 %      Neutrophils, Absolute 7.53 10*3/mm3      Lymphocytes, Absolute 1.25  10*3/mm3      Monocytes, Absolute 0.92 10*3/mm3      Eosinophils, Absolute 0.03 10*3/mm3      Basophils, Absolute 0.02 10*3/mm3      Immature Grans, Absolute 0.03 10*3/mm3      nRBC 0.0 /100 WBC     Hemoglobin & Hematocrit, Blood [[id number]]  (Normal) Collected: 12/02/22 1318    Specimen: Blood Updated: 12/02/22 1327     Hemoglobin 14.4 g/dL      Hematocrit 42.6 %     Vitamin D,25-Hydroxy [[id number]]  (Normal) Collected: 12/02/22 0708    Specimen: Blood Updated: 12/02/22 1313     25 Hydroxy, Vitamin D 36.4 ng/ml     Narrative:      Reference Range for Total Vitamin D 25(OH)     Deficiency <20.0 ng/mL   Insufficiency 21-29 ng/mL   Sufficiency  ng/mL  Toxicity >100 ng/ml    Results may be falsely increased if patient taking Biotin.      Folate [[id number]]  (Normal) Collected: 12/02/22 0708    Specimen: Blood Updated: 12/02/22 1313     Folate 8.81 ng/mL     Narrative:      Results may be falsely increased if patient taking Biotin.      Vitamin B12 [[id number]]  (Normal) Collected: 12/02/22 0708    Specimen: Blood Updated: 12/02/22 1313     Vitamin B-12 325 pg/mL     Narrative:      Results may be falsely increased if patient taking Biotin.            Culture Data:   No results found for: BLOODCX  No results found for: URINECX  No results found for: RESPCX  No results found for: WOUNDCX  No results found for: STOOLCX  No components found for: BODYFLD    Radiology Data:   Imaging Results (Last 24 Hours)     Procedure Component Value Units Date/Time    US Renal Bilateral [[id number]] Collected: 12/02/22 1732     Updated: 12/02/22 1859    Narrative:      EXAM:  US RETROPERITONEUM    ORDERING PROVIDER:  MERCEDEZ KEMP    CLINICAL HISTORY:    Hematuria    COMPARISON STUDY:      TECHNIQUE:      Transverse and longitudinal sonographic and Doppler imaging of  bilateral kidneys and urinary bladder was performed.    FINDINGS:        RIGHT KIDNEY:  10.5 x 4.1 x 5.8 cm. The right renal cortex  demonstrates unremarkable contour  and increased cortical  echogenicity due to chronic medical renal disease. No  nephrolithiasis. Mild-to-moderate hydronephrosis. No solid mass.    LEFT  KIDNEY:  10.8 x 3.7 x 3.5 cm.  The left renal cortex  demonstrates unremarkable contour and increased cortical  echogenicity due to chronic medical renal disease. No  nephrolithiasis. Mild-to-moderate hydronephrosis. No solid mass.    URINARY BLADDER: Decompressed with Godwin catheter in place.      Impression:      There are bilateral mild-to-moderate hydronephrosis on both  sides.  Bilateral chronic medical renal disease with increased cortical  echogenicity on both sides.  Urinary bladder is decompressed with Godwin catheter.    Electronically signed by:  Panfilo Horton MD  12/2/2022 6:57 PM CST  Workstation: [id number]    FL C Arm During Surgery [[id number]] Resulted: 12/02/22 1331     Updated: 12/02/22 1331          I have reviewed the patient's current medications.     Assessment/Plan     Active Hospital Problems    Diagnosis    • **Closed fracture of proximal end of left femur, initial encounter ([facility])    • Urinary retention      Confusion- unclear of patient's baseline.  We will continue to monitor.    Left femur fracture-status post repair, orthopedics managing, will continue with PT OT  Continue with PT OT, will determine placement     Hematuria-we will continue to monitor has a Godwin catheter.  May need urology to see him as well  Urology is also managing, urine is more clear today.  Continues with Godwin catheter.     Hypertension-continue managing blood pressure     Dyslipidemia-continue with Lipitor      pain-continue with pain control     DVT prophylaxis-SCD          Jordy Fernandez MD   12/03/22   10:56 CST

## 2022-12-03 NOTE — NURSING NOTE
Pt started screaming out 'help me!' went to check pt, had loosened wrist restraints and was pulling on mcconnell. Re secured wrist restraints and pt then began trying to kick me in the head, and continued to yell. Mayito SEGOVIA soft restraints placed and Dr Fernandez notified. Haldol x 1 ordered. When given, pt tried to bite me. Will monitor.

## 2022-12-04 ENCOUNTER — ANESTHESIA (OUTPATIENT)
Dept: PERIOP | Facility: HOSPITAL | Age: 87
End: 2022-12-04

## 2022-12-04 ENCOUNTER — ANESTHESIA EVENT (OUTPATIENT)
Dept: PERIOP | Facility: HOSPITAL | Age: 87
End: 2022-12-04

## 2022-12-04 LAB
ALBUMIN SERPL-MCNC: 3.2 G/DL (ref 3.5–5.2)
ALBUMIN/GLOB SERPL: 1.3 G/DL
ALP SERPL-CCNC: 80 U/L (ref 39–117)
ALT SERPL W P-5'-P-CCNC: <5 U/L (ref 1–41)
ANION GAP SERPL CALCULATED.3IONS-SCNC: 11 MMOL/L (ref 5–15)
AST SERPL-CCNC: 31 U/L (ref 1–40)
BASOPHILS # BLD AUTO: 0.03 10*3/MM3 (ref 0–0.2)
BASOPHILS NFR BLD AUTO: 0.4 % (ref 0–1.5)
BILIRUB SERPL-MCNC: 0.3 MG/DL (ref 0–1.2)
BUN SERPL-MCNC: 26 MG/DL (ref 8–23)
BUN/CREAT SERPL: 20.3 (ref 7–25)
CALCIUM SPEC-SCNC: 8.7 MG/DL (ref 8.6–10.5)
CHLORIDE SERPL-SCNC: 108 MMOL/L (ref 98–107)
CO2 SERPL-SCNC: 24 MMOL/L (ref 22–29)
CREAT SERPL-MCNC: 1.28 MG/DL (ref 0.76–1.27)
DEPRECATED RDW RBC AUTO: 42.2 FL (ref 37–54)
EGFRCR SERPLBLD CKD-EPI 2021: 54.2 ML/MIN/1.73
EOSINOPHIL # BLD AUTO: 0.21 10*3/MM3 (ref 0–0.4)
EOSINOPHIL NFR BLD AUTO: 2.5 % (ref 0.3–6.2)
ERYTHROCYTE [DISTWIDTH] IN BLOOD BY AUTOMATED COUNT: 12.2 % (ref 12.3–15.4)
GLOBULIN UR ELPH-MCNC: 2.5 GM/DL
GLUCOSE SERPL-MCNC: 121 MG/DL (ref 65–99)
HCT VFR BLD AUTO: 36.8 % (ref 37.5–51)
HGB BLD-MCNC: 12.3 G/DL (ref 13–17.7)
IMM GRANULOCYTES # BLD AUTO: 0.03 10*3/MM3 (ref 0–0.05)
IMM GRANULOCYTES NFR BLD AUTO: 0.4 % (ref 0–0.5)
LYMPHOCYTES # BLD AUTO: 1.2 10*3/MM3 (ref 0.7–3.1)
LYMPHOCYTES NFR BLD AUTO: 14.4 % (ref 19.6–45.3)
MCH RBC QN AUTO: 31.3 PG (ref 26.6–33)
MCHC RBC AUTO-ENTMCNC: 33.4 G/DL (ref 31.5–35.7)
MCV RBC AUTO: 93.6 FL (ref 79–97)
MONOCYTES # BLD AUTO: 0.96 10*3/MM3 (ref 0.1–0.9)
MONOCYTES NFR BLD AUTO: 11.5 % (ref 5–12)
NEUTROPHILS NFR BLD AUTO: 5.91 10*3/MM3 (ref 1.7–7)
NEUTROPHILS NFR BLD AUTO: 70.8 % (ref 42.7–76)
NRBC BLD AUTO-RTO: 0 /100 WBC (ref 0–0.2)
PLATELET # BLD AUTO: 141 10*3/MM3 (ref 140–450)
PMV BLD AUTO: 10.9 FL (ref 6–12)
POTASSIUM SERPL-SCNC: 3.4 MMOL/L (ref 3.5–5.2)
PROT SERPL-MCNC: 5.7 G/DL (ref 6–8.5)
RBC # BLD AUTO: 3.93 10*6/MM3 (ref 4.14–5.8)
SODIUM SERPL-SCNC: 143 MMOL/L (ref 136–145)
WBC NRBC COR # BLD: 8.34 10*3/MM3 (ref 3.4–10.8)

## 2022-12-04 PROCEDURE — 94799 UNLISTED PULMONARY SVC/PX: CPT

## 2022-12-04 PROCEDURE — 94664 DEMO&/EVAL PT USE INHALER: CPT

## 2022-12-04 PROCEDURE — 25010000002 PROPOFOL 10 MG/ML EMULSION: Performed by: NURSE ANESTHETIST, CERTIFIED REGISTERED

## 2022-12-04 PROCEDURE — 97530 THERAPEUTIC ACTIVITIES: CPT

## 2022-12-04 PROCEDURE — 25010000002 ENOXAPARIN PER 10 MG: Performed by: UROLOGY

## 2022-12-04 PROCEDURE — 97116 GAIT TRAINING THERAPY: CPT

## 2022-12-04 PROCEDURE — 25010000002 CEFAZOLIN PER 500 MG: Performed by: UROLOGY

## 2022-12-04 PROCEDURE — 99024 POSTOP FOLLOW-UP VISIT: CPT | Performed by: STUDENT IN AN ORGANIZED HEALTH CARE EDUCATION/TRAINING PROGRAM

## 2022-12-04 PROCEDURE — 0T9B80Z DRAINAGE OF BLADDER WITH DRAINAGE DEVICE, VIA NATURAL OR ARTIFICIAL OPENING ENDOSCOPIC: ICD-10-PCS | Performed by: UROLOGY

## 2022-12-04 PROCEDURE — 80053 COMPREHEN METABOLIC PANEL: CPT | Performed by: UROLOGY

## 2022-12-04 PROCEDURE — 25010000002 CEFAZOLIN PER 500 MG: Performed by: FAMILY MEDICINE

## 2022-12-04 PROCEDURE — 85025 COMPLETE CBC W/AUTO DIFF WBC: CPT | Performed by: FAMILY MEDICINE

## 2022-12-04 PROCEDURE — 94760 N-INVAS EAR/PLS OXIMETRY 1: CPT

## 2022-12-04 RX ORDER — SODIUM CHLORIDE 9 MG/ML
INJECTION, SOLUTION INTRAVENOUS CONTINUOUS PRN
Status: DISCONTINUED | OUTPATIENT
Start: 2022-12-04 | End: 2022-12-04 | Stop reason: SURG

## 2022-12-04 RX ORDER — PROPOFOL 10 MG/ML
VIAL (ML) INTRAVENOUS AS NEEDED
Status: DISCONTINUED | OUTPATIENT
Start: 2022-12-04 | End: 2022-12-04 | Stop reason: SURG

## 2022-12-04 RX ORDER — NALOXONE HCL 0.4 MG/ML
0.4 VIAL (ML) INJECTION
Status: DISCONTINUED | OUTPATIENT
Start: 2022-12-04 | End: 2022-12-04

## 2022-12-04 RX ORDER — DEXTROSE AND SODIUM CHLORIDE 5; .45 G/100ML; G/100ML
100 INJECTION, SOLUTION INTRAVENOUS CONTINUOUS
Status: DISCONTINUED | OUTPATIENT
Start: 2022-12-04 | End: 2022-12-05

## 2022-12-04 RX ORDER — ONDANSETRON 2 MG/ML
4 INJECTION INTRAMUSCULAR; INTRAVENOUS EVERY 6 HOURS PRN
Status: DISCONTINUED | OUTPATIENT
Start: 2022-12-04 | End: 2022-12-09 | Stop reason: HOSPADM

## 2022-12-04 RX ORDER — ONDANSETRON 4 MG/1
4 TABLET, FILM COATED ORAL EVERY 6 HOURS PRN
Status: DISCONTINUED | OUTPATIENT
Start: 2022-12-04 | End: 2022-12-09 | Stop reason: HOSPADM

## 2022-12-04 RX ORDER — LIDOCAINE HYDROCHLORIDE 20 MG/ML
INJECTION, SOLUTION INFILTRATION; PERINEURAL AS NEEDED
Status: DISCONTINUED | OUTPATIENT
Start: 2022-12-04 | End: 2022-12-04 | Stop reason: SURG

## 2022-12-04 RX ADMIN — IPRATROPIUM BROMIDE AND ALBUTEROL SULFATE 3 ML: 2.5; .5 SOLUTION RESPIRATORY (INHALATION) at 08:36

## 2022-12-04 RX ADMIN — PROPOFOL 20 MG: 10 INJECTION, EMULSION INTRAVENOUS at 07:57

## 2022-12-04 RX ADMIN — PROPOFOL 30 MG: 10 INJECTION, EMULSION INTRAVENOUS at 07:50

## 2022-12-04 RX ADMIN — IPRATROPIUM BROMIDE AND ALBUTEROL SULFATE 3 ML: 2.5; .5 SOLUTION RESPIRATORY (INHALATION) at 15:20

## 2022-12-04 RX ADMIN — CEFAZOLIN 2 G: 10 INJECTION, POWDER, FOR SOLUTION INTRAVENOUS at 12:54

## 2022-12-04 RX ADMIN — LIDOCAINE HYDROCHLORIDE 30 MG: 20 INJECTION, SOLUTION INFILTRATION; PERINEURAL at 07:50

## 2022-12-04 RX ADMIN — ACETAMINOPHEN 650 MG: 325 TABLET, FILM COATED ORAL at 12:49

## 2022-12-04 RX ADMIN — MELATONIN 3 MG: 3 TAB ORAL at 20:34

## 2022-12-04 RX ADMIN — IPRATROPIUM BROMIDE AND ALBUTEROL SULFATE 3 ML: 2.5; .5 SOLUTION RESPIRATORY (INHALATION) at 12:21

## 2022-12-04 RX ADMIN — ATORVASTATIN CALCIUM 40 MG: 40 TABLET, FILM COATED ORAL at 20:34

## 2022-12-04 RX ADMIN — Medication 1 TABLET: at 12:48

## 2022-12-04 RX ADMIN — SODIUM CHLORIDE: 9 INJECTION, SOLUTION INTRAVENOUS at 07:33

## 2022-12-04 RX ADMIN — ASPIRIN 81 MG: 81 TABLET, CHEWABLE ORAL at 12:49

## 2022-12-04 RX ADMIN — IPRATROPIUM BROMIDE AND ALBUTEROL SULFATE 3 ML: 2.5; .5 SOLUTION RESPIRATORY (INHALATION) at 20:00

## 2022-12-04 RX ADMIN — RIVASTIGMINE TRANSDERMAL SYSTEM 1 PATCH: 4.6 PATCH, EXTENDED RELEASE TRANSDERMAL at 11:30

## 2022-12-04 RX ADMIN — CEFAZOLIN 2 G: 10 INJECTION, POWDER, FOR SOLUTION INTRAVENOUS at 18:21

## 2022-12-04 RX ADMIN — CEFAZOLIN 2 G: 10 INJECTION, POWDER, FOR SOLUTION INTRAVENOUS at 02:50

## 2022-12-04 RX ADMIN — ENOXAPARIN SODIUM 40 MG: 100 INJECTION SUBCUTANEOUS at 12:49

## 2022-12-04 RX ADMIN — ESCITALOPRAM 5 MG: 5 TABLET, FILM COATED ORAL at 12:49

## 2022-12-04 NOTE — PROGRESS NOTES
Signed        ID: 88 yo male s/p left hip intramedullary nail for left intertrochanteric fracture on 12/2/2022     S: Patient resting comfortably.  Still a bit confused but less agitated than yesterday.     PE  Lying in bed, appears comfortable  Focused exam of his left lower extremity reveals dressings which are clean and dry.  Skin otherwise intact.  Witnessed to dorsiflex and plantarflex ankle.  Foot is warm and well perfused.     Labs  12/3- Hct 38.2     A/P: 88 yo male s/p above     - He is weight bearing as tolerated and activity as tolerated to the left lower extremity with PT and OT  - Agree with Lovenox and SCDs.  Recommend chemoprophylaxis for 30 days post surgery  - Dressings removed today.  Incision is clean and dry.  Ok to leave incision open to air.  May re-apply dressings if there is any evidence of drainage.  - He should follow up with the orthopaedic clinic approximately 2 weeks from his surgery date.  - Disposition pending

## 2022-12-04 NOTE — PLAN OF CARE
Goal Outcome Evaluation:               Vss, restraints dcd and pt doing ok and leaving mcconnell alone at this time. Wife visiting at bedside.

## 2022-12-04 NOTE — PROGRESS NOTES
LOS: 3 days     Patient Care Team:  Nicole Burgos APRN as PCP - General (Urgent Care)      Subjective     Urinary retention bilateral hydro    Objective       Vital Signs  Temp:  [96 °F (35.6 °C)-99 °F (37.2 °C)] 99 °F (37.2 °C)  Heart Rate:  [76-92] 87  Resp:  [16-18] 18  BP: (133-168)/(70-82) 141/82    Physical Exam:        General Appearance:   No acute distress     Respiratory:    UNLABORED RESPIRATIONS.     Abdomen:     SOFT.       Genitourinary:  Urine clear Godwin draining well     Rectal:     DEFERRED       Results Review:       Imaging Results (Last 24 Hours)     Procedure Component Value Units Date/Time    CT Head Without Contrast [[id number]] Collected: 12/03/22 1153     Updated: 12/03/22 1220    Narrative:      EXAM:  CT HEAD WITHOUT IV CONTRAST    ORDERING PROVIDER:  TACOS TATUM    CLINICAL HISTORY:   Altered mental status    COMPARISON:   10/2/2022 and 10/9/2022    TECHNIQUE:   Nonenhanced CT of the head was performed and reformatted in the  sagittal and coronal planes.     This examination was performed according to our departmental dose  optimization program which includes automated exposure control,  adjustment of the MA and kV according to patient size, and/or use  of iterative reconstruction technique.    FINDINGS:     CEREBRAL PARENCHYMA:  Chronic microangiopathic changes. No  hemorrhage.  No intracranial mass or mass effect. Age-appropriate  cerebral atrophy.    POSTERIOR FOSSA:  Age-appropriate atrophy of cerebellum and  brainstem.  No cerebellar tonsillar ectopia.    EXTRA-AXIAL SPACES:  Normal size and configuration.  No mass,  fluid collection or hemorrhage.    ORBITS: No mass. Unremarkable extraocular muscles, globe and  optic nerve.    CALVARIA AND SOFT TISSUES:  No mass or adenopathy, lytic or  sclerotic lesion.    TEMPORAL BONE AND SKULL BASE:  Unremarkable middle and inner ear  , and mastoid air cells.    PARANASAL SINUSES AND FACIAL BONES: Unremarkable.     VASCULAR STRUCTURES:   Unremarkable.      Impression:      1.  No acute intracranial process.  2.  Scattered chronic microangiopathic changes.      Electronically signed by:  Panfilo Horton MD  12/3/2022 12:18 PM CST  Workstation: [id number]        Lab Results (last 24 hours)     Procedure Component Value Units Date/Time    Comprehensive Metabolic Panel [[id number]]  (Abnormal) Collected: 12/03/22 0701    Specimen: Blood Updated: 12/03/22 0803     Glucose 119 mg/dL      BUN 24 mg/dL      Creatinine 1.34 mg/dL      Sodium 146 mmol/L      Potassium 3.6 mmol/L      Chloride 109 mmol/L      CO2 24.0 mmol/L      Calcium 9.3 mg/dL      Total Protein 5.9 g/dL      Albumin 3.60 g/dL      ALT (SGPT) 9 U/L      AST (SGOT) 29 U/L      Alkaline Phosphatase 80 U/L      Total Bilirubin 0.5 mg/dL      Globulin 2.3 gm/dL      A/G Ratio 1.6 g/dL      BUN/Creatinine Ratio 17.9     Anion Gap 13.0 mmol/L      eGFR 51.3 mL/min/1.73      Comment: National Kidney Foundation and American Society of Nephrology (ASN) Task Force recommended calculation based on the Chronic Kidney Disease Epidemiology Collaboration (CKD-EPI) equation refit without adjustment for race.       Narrative:      GFR Normal >60  Chronic Kidney Disease <60  Kidney Failure <15    The GFR formula is only valid for adults with stable renal function between ages 18 and 70.    CBC & Differential [[id number]]  (Abnormal) Collected: 12/03/22 0701    Specimen: Blood Updated: 12/03/22 0756    Narrative:      The following orders were created for panel order CBC & Differential.  Procedure                               Abnormality         Status                     ---------                               -----------         ------                     CBC Auto Differential[[id number]]        Abnormal            Final result               Scan Slide[[id number]]                                                                    Please view results for these tests on the individual orders.    CBC Auto  Differential [[id number]]  (Abnormal) Collected: 12/03/22 0701    Specimen: Blood Updated: 12/03/22 0756     WBC 9.78 10*3/mm3      RBC 4.10 10*6/mm3      Hemoglobin 13.0 g/dL      Hematocrit 38.2 %      MCV 93.2 fL      MCH 31.7 pg      MCHC 34.0 g/dL      RDW 12.6 %      RDW-SD 42.9 fl      MPV 10.3 fL      Platelets 110 10*3/mm3      Neutrophil % 77.0 %      Lymphocyte % 12.8 %      Monocyte % 9.4 %      Eosinophil % 0.3 %      Basophil % 0.2 %      Immature Grans % 0.3 %      Neutrophils, Absolute 7.53 10*3/mm3      Lymphocytes, Absolute 1.25 10*3/mm3      Monocytes, Absolute 0.92 10*3/mm3      Eosinophils, Absolute 0.03 10*3/mm3      Basophils, Absolute 0.02 10*3/mm3      Immature Grans, Absolute 0.03 10*3/mm3      nRBC 0.0 /100 WBC             I reviewed the patient's new clinical results.  I reviewed the patient's new imaging results and agree with the interpretation.  I reviewed the patient's other test results and agree with the interpretation        Assessment:    BPH urinary retention bilateral hydro    Plan:     Cystoscopy bilateral retrogrades risk and benefits of been discussed with patient's wife      Juan Giang MD  12/04/22  07:26 CST

## 2022-12-04 NOTE — THERAPY TREATMENT NOTE
"Acute Care - Physical Therapy Treatment Note  [facility]     Patient Name: Teofilo Brown  : 1935  MRN: [id number]  Today's Date: 2022      Visit Dx:     ICD-10-CM ICD-9-CM   1. Closed fracture of proximal end of left femur, initial encounter ([facility])  S72.002A 820.8   2. Fall, initial encounter  W19.XXXA E888.9   3. Urinary retention  R33.9 788.20   4. Impaired mobility and ADLs  Z74.09 V49.89    Z78.9    5. Impaired functional mobility, balance, gait, and endurance  Z74.09 V49.89   6. 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[facility], due to cerebrovascular dz)   • Cerebrovascular disease   • COPD (chronic obstructive pulmonary disease) ([facility])   • Benign essential HTN   • HLD (hyperlipidemia)   • CAD (coronary artery disease)   • Closed fracture of proximal end of left femur, initial encounter ([facility])   • Urinary retention     Past Medical History:   Diagnosis Date   • Dementia ([facility])    • Hyperlipidemia    • Myocardial infarction ([facility])      Past Surgical History:   Procedure Laterality Date   • CAROTID STENT       PT Assessment (last 12 hours)     PT Evaluation and Treatment     Row Name 22 105          Physical Therapy Time and Intention    Document Type therapy note (daily note)  -     Mode of Treatment physical therapy;individual therapy  -     Row Name 22 105          General Information    Patient Profile Reviewed yes  -     Existing Precautions/Restrictions fall  -     Row Name 22 105          Pain    Pretreatment Pain Rating 0/10 - no pain  \"not unless I try to get up and walk.\"  -     Posttreatment Pain Rating 0/10 - no pain  -     Row Name 22 1057          Cognition    Affect/Mental Status (Cognition) confused  -     Orientation Status (Cognition) oriented to;person  -     Row Name 22 105          Mobility    " Extremity Weight-bearing Status left lower extremity  -     Left Lower Extremity (Weight-bearing Status) weight-bearing as tolerated (WBAT)  -     Row Name 12/04/22 1057          Bed Mobility    Bed Mobility supine-sit;sit-supine;scooting/bridging  -     Scooting/Bridging [place] (Bed Mobility) dependent (less than 25% patient effort);2 person assist  -     Supine-Sit [place] (Bed Mobility) moderate assist (50% patient effort)  -     Sit-Supine [place] (Bed Mobility) dependent (less than 25% patient effort);2 person assist  -     Assistive Device (Bed Mobility) bed rails;head of bed elevated  -     Comment, (Bed Mobility) extended time required for sup-sit. pt able to maintain sitting balance with CGA.  -     Row Name 12/04/22 1057          Transfers    Transfers sit-stand transfer;stand-sit transfer  -     Row Name 12/04/22 1057          Sit-Stand Transfer    Sit-Stand [place] (Transfers) moderate assist (50% patient effort)  -     Assistive Device (Sit-Stand Transfers) walker, front-wheeled  -     Row Name 12/04/22 1057          Stand-Sit Transfer    Stand-Sit [place] (Transfers) moderate assist (50% patient effort)  -     Assistive Device (Stand-Sit Transfers) walker, front-wheeled  -     Row Name 12/04/22 1057          Gait/Stairs (Locomotion)    Gait/Stairs Locomotion gait/ambulation independence;gait/ambulation assistive device  -     [place] Level (Gait) moderate assist (50% patient effort);2 person assist  -     Assistive Device (Gait) walker, front-wheeled  -     Distance in Feet (Gait) 7(fwd, back, and sidesteps)  -     Pattern (Gait) 3-point;step-to  -LORENA     Deviations/Abnormal Patterns (Gait) antalgic;araceli decreased;scissoring;stride length decreased  -     Row Name 12/04/22 1057          Safety Issues, Functional Mobility    Impairments Affecting Function (Mobility) balance;endurance/activity tolerance;grasp;pain;strength;shortness of  breath  -     Row Name             Wound 12/02/22 Left hip Incision    Wound - Properties Group Placement Date: 12/02/22  - Present on Hospital Admission: N  -JM Side: Left  -JM Location: hip  -JM Primary Wound Type: Incision  -JM    Retired Wound - Properties Group Placement Date: 12/02/22  - Present on Hospital Admission: N  -JM Side: Left  -JM Location: hip  -JM Primary Wound Type: Incision  -JM    Retired Wound - Properties Group Date first assessed: 12/02/22  - Present on Hospital Admission: N  -JM Side: Left  -JM Location: hip  -JM Primary Wound Type: Incision  -JM    Row Name 12/04/22 1057          Vital Signs    Pre Systolic BP Rehab 167  -     Pre Treatment Diastolic BP 90  -LORENA     Post Systolic BP Rehab --  ending BP taken and improved from initial BP. failed to record ending BP  -     Pretreatment Heart Rate (beats/min) 93  -LORENA     Posttreatment Heart Rate (beats/min) 156  -LORENA     Pretreatment Resp Rate (breaths/min) 69  -LORENA     Pre SpO2 (%) 95  -LORENA     O2 Delivery Pre Treatment supplemental O2  -LORENA     Post SpO2 (%) 98  -LORENA     O2 Delivery Post Treatment supplemental O2  -LORENA     Pre Patient Position Supine  -     Post Patient Position Supine  -     Row Name 12/04/22 1057          Positioning and Restraints    Pre-Treatment Position in bed  -LORENA     Post Treatment Position bed  -LORENA     In Bed fowlers;call light within reach;encouraged to call for assist;exit alarm on  all needs met.  -     Row Name 12/04/22 1057          Bed Mobility Goal 1 (PT)    Activity/Assistive Device (Bed Mobility Goal 1, PT) sit to supine;supine to sit  -     [place] Level/Cues Needed (Bed Mobility Goal 1, PT) standby assist  -     Time Frame (Bed Mobility Goal 1, PT) by discharge  -     Progress/Outcomes (Bed Mobility Goal 1, PT) goal not met  -     Row Name 12/04/22 1057          Transfer Goal 1 (PT)    Activity/Assistive Device (Transfer Goal 1, PT)  sit-to-stand/stand-to-sit;bed-to-chair/chair-to-bed  -LORENA     [place] Level/Cues Needed (Transfer Goal 1, PT) standby assist  -LORENA     Time Frame (Transfer Goal 1, PT) by discharge  -LORENA     Progress/Outcome (Transfer Goal 1, PT) goal not met  -LORENA     Row Name 12/04/22 1057          Gait Training Goal 1 (PT)    Activity/Assistive Device (Gait Training Goal 1, PT) gait (walking locomotion);assistive device use  -LORENA     [place] Level (Gait Training Goal 1, PT) standby assist  -LORENA     Distance (Gait Training Goal 1, PT) 50'x1  -LORENA     Time Frame (Gait Training Goal 1, PT) by discharge  -LORENA     Progress/Outcome (Gait Training Goal 1, PT) goal not met  -LORENA           User Key  (r) = Recorded By, (t) = Taken By, (c) = Cosigned By    Initials Name Provider Type     Percy Camacho PTA Physical Therapist Assistant    Eugenia Harrington RN Registered Nurse                Physical Therapy Education     Title: PT OT SLP Therapies (In Progress)     Topic: Physical Therapy (In Progress)     Point: Mobility training (In Progress)     Learning Progress Summary           Patient Acceptance, E, NR by  at 12/4/2022 1222    Acceptance, E,TB, VU by LR at 12/2/2022 1458    Comment: Educated on PT POC and goals.                   Point: Home exercise program (Done)     Learning Progress Summary           Patient Acceptance, E,TB, VU by LR at 12/2/2022 1458    Comment: Educated on PT POC and goals.                   Point: Body mechanics (Done)     Learning Progress Summary           Patient Acceptance, E,TB, VU by LR at 12/2/2022 1458    Comment: Educated on PT POC and goals.                   Point: Precautions (Done)     Learning Progress Summary           Patient Acceptance, E,TB, VU by LR at 12/2/2022 1458    Comment: Educated on PT POC and goals.                               User Key     Initials Effective Dates Name Provider Type [facility] 06/16/21 -  Percy Camacho PTA Physical Therapist Assistant PT    LR  06/16/21 -  Milan Cole Physical Therapist PT              PT Recommendation and Plan     Plan of Care Reviewed With: patient  Progress: improving  Outcome Evaluation: pt responded well to PT. pt remains confused but pleasant at this time. pt requires mod A and extended time for sup-sit. dep A x2 for sit-sup and scooting. pt able to maintain sitting balance with CGA at EOB. pt stood with mod and participated in gait training- 7 ft mod A w/ RW. tx ended as lunch arrived. no new goalsmet at this time. pt would continue to benefit from PT services.   Outcome Measures     Row Name 12/04/22 1057             How much help from another person do you currently need...    Turning from your back to your side while in flat bed without using bedrails? 2  -LORENA      Moving from lying on back to sitting on the side of a flat bed without bedrails? 2  -LORENA      Moving to and from a bed to a chair (including a wheelchair)? 2  -LORENA      Standing up from a chair using your arms (e.g., wheelchair, bedside chair)? 2  -LORENA      Climbing 3-5 steps with a railing? 1  -LORENA      To walk in hospital room? 2  -LORENA      AM-PAC 6 Clicks Score (PT) 11  -LORENA         Functional Assessment    Outcome Measure Options AM-PAC 6 Clicks Basic Mobility (PT)  -LORENA            User Key  (r) = Recorded By, (t) = Taken By, (c) = Cosigned By    Initials Name Provider Type    Percy Lee, PTA Physical Therapist Assistant                 Time Calculation:    PT Charges     Row Name 12/04/22 1224             Time Calculation    Start Time 1057  -LORENA      Stop Time 1122  -LORENA      Time Calculation (min) 25 min  -LORENA         Time Calculation- PT    Total Timed Code Minutes- PT 25 minute(s)  -LROENA         Timed Charges    88488 - Gait Training Minutes  8  -LORENA      22428 - PT Therapeutic Activity Minutes 17  -LORENA         Total Minutes    Timed Charges Total Minutes 25  -LORENA       Total Minutes 25  -LORENA            User Key  (r) = Recorded By, (t) = Taken By, (c) = Cosigned  By    Initials Name Provider Type    LORENA Percy Camacho PTA Physical Therapist Assistant              Therapy Charges for Today     Code Description Service Date Service Provider Modifiers Qty    15273917531 HC PT THER SUPP EA 15 MIN 12/3/2022 Percy Camacho, KOTA GP 1    07368015642 HC GAIT TRAINING EA 15 MIN 12/4/2022 Percy Camacho, KOTA GP 1    68775085376 HC PT THERAPEUTIC ACT EA 15 MIN 12/4/2022 Percy Camacho, KOTA GP 1          PT G-Codes  Outcome Measure Options: AM-PAC 6 Clicks Basic Mobility (PT)  AM-PAC 6 Clicks Score (PT): 11  AM-PAC 6 Clicks Score (OT): 12    Percy Camacho PTA  12/4/2022

## 2022-12-04 NOTE — PLAN OF CARE
Goal Outcome Evaluation:  Plan of Care Reviewed With: patient     Problem: Adult Inpatient Plan of Care  Goal: Plan of Care Review  Outcome: Ongoing, Progressing  Flowsheets (Taken 12/4/2022 1222)  Progress: improving  Plan of Care Reviewed With: patient  Outcome Evaluation: pt responded well to PT. pt remains confused but pleasant at this time. pt requires mod A and extended time for sup-sit. dep A x2 for sit-sup and scooting. pt able to maintain sitting balance with CGA at EOB. pt stood with mod and participated in gait training- 7 ft mod A w/ RW. tx ended as lunch arrived. no new goalsmet at this time. pt would continue to benefit from PT services.

## 2022-12-04 NOTE — OP NOTE
CYSTOSCOPY RETROGRADE PYELOGRAM AND STENT INSERTION  Procedure Note    Teofilo Scruggs Stone  12/4/2022    Pre-op Diagnosis:   Urinary retention [R33.9]    Post-op Diagnosis:     Post-Op Diagnosis Codes:     * Urinary retention [R33.9]    Procedure(s):  CYSTOSCOPY    Surgeon(s):  Juan Giang MD    Anesthesia: Choice    Staff:   Circulator: Ivone Jackson RN  Scrub Person: Caty Reid          Estimated Blood Loss: none    Specimens:                None      Drains:   Urethral Catheter Silicone 16 Fr. (Active)   Daily Indications Acute Urinary Retention 12/03/22 1948   Site Assessment Clean;Skin intact 12/03/22 1948   Collection Container Standard drainage bag 12/03/22 1948   Securement Method Securing device 12/03/22 1948   Catheter care complete Yes 12/04/22 0300   Output (mL) 400 mL 12/04/22 0340       Findings: Obstructing prostate heavily trabeculated bladder    Complications: None    Indications: Same    Description of Procedure: Patient brought surgery placed in dorsolithotomy position.  Sterile prep and drape genitalia in routine fashion.  I passed a 18 Egyptian flexible cystoscope down the ant urethra which was open obstructing prostate was noted.  No tumors or calculi within the bladder.  Ureters were displaced laterally  No tumors or calculi was found in the bladder.  Following this I placed a 16 Egyptian Godwin catheter 10 cc placed in balloon patient taken recovery  Juan Giang MD     Date: 12/4/2022  Time: 08:48 CST

## 2022-12-04 NOTE — ANESTHESIA PROCEDURE NOTES
Peripheral IV    Line placed for Fluids/Medication Admin.  Performed By   CRNA/CAA: Tawanda Mancilla CRNA  Preanesthetic Checklist  Completed: patient identified, IV checked, site marked, risks and benefits discussed, surgical consent, monitors and equipment checked, pre-op evaluation and timeout performed  Peripheral IV Prep    Prep: ChloraPrep  Peripheral IV Procedure   Laterality:right  Location:  Forearm  Catheter size: 20 G          Post Assessment   Dressing Type: transparent and tape.    IV Dressing/Site: clean, dry and intact  Additional Notes  Per Genevieve PERKINS

## 2022-12-04 NOTE — NURSING NOTE
Restraints dcd at 0800, pt sitting in bed visiting with visitors. Pleasant at this time, will monitor.

## 2022-12-04 NOTE — PLAN OF CARE
Goal Outcome Evaluation:            Patient still trying to pull his mcconnell out when not restrained, vss, npo since midnight, cysto this am

## 2022-12-04 NOTE — SIGNIFICANT NOTE
12/03/22 1043   OTHER   Discipline physical therapy assistant   Rehab Time/Intention   Session Not Performed other (see comments)  (pt agitated, in restraints. assisted nsg in adjusting restraints so that pt is unable to pull at catheter. no PT tx at this time.)

## 2022-12-04 NOTE — ANESTHESIA POSTPROCEDURE EVALUATION
Patient: Teofilo Scruggs Stone    Procedure Summary     Date: 12/04/22 Room / Location: [facility] OR 02 / [facility] OR    Anesthesia Start: 0733 Anesthesia Stop: 0812    Procedure: CYSTOSCOPY (Bilateral) Diagnosis:       Urinary retention      (Urinary retention [R33.9])    Surgeons: Juan Giang MD Provider: Tawanda Mancilla CRNA    Anesthesia Type: general ASA Status: 4 - Emergent          Anesthesia Type: general    Vitals  No vitals data found for the desired time range.          Post Anesthesia Care and Evaluation    Patient location during evaluation: bedside  Patient participation: complete - patient participated  Level of consciousness: sleepy but conscious  Pain score: 0  Pain management: adequate    Airway patency: patent  Anesthetic complications: No anesthetic complications  PONV Status: none  Cardiovascular status: acceptable  Respiratory status: acceptable  Hydration status: acceptable    Comments: -------------------------              12/04/22 0822        -------------------------   BP:         122/74        Pulse:        87          Resp:         18          Temp:   99 °F (37.2 °C)   SpO2:         94%        -------------------------

## 2022-12-04 NOTE — PROGRESS NOTES
[facility] Medicine Services  INPATIENT PROGRESS NOTE    Length of Stay: 3  Date of Admission: 12/1/2022  Primary Care Physician: Nicole Burgos APRN    Subjective   Chief Complaint: Confusion      HPI:    Patient is still having confusion and also gets agitated at times.  Otherwise has been doing stable after surgery.    Review of Systems   Unable to perform ROS: Mental status change        All pertinent negatives and positives are as above. All other systems have been reviewed and are negative unless otherwise stated.     Objective    Temp:  [97 °F (36.1 °C)-99 °F (37.2 °C)] 97 °F (36.1 °C)  Heart Rate:  [76-96] 94  Resp:  [16-18] 16  BP: (133-164)/(61-82) 135/61  Physical Exam  Vitals and nursing note reviewed.   Constitutional:       General: He is not in acute distress.     Appearance: He is well-developed. He is not diaphoretic.   HENT:      Head: Normocephalic and atraumatic.   Cardiovascular:      Rate and Rhythm: Normal rate.   Pulmonary:      Effort: Pulmonary effort is normal. No respiratory distress.      Breath sounds: No wheezing.   Abdominal:      General: There is no distension.      Palpations: Abdomen is soft.   Musculoskeletal:         General: Normal range of motion.   Skin:     General: Skin is warm and dry.   Neurological:      Mental Status: He is alert.      Cranial Nerves: No cranial nerve deficit.   Psychiatric:      Comments: Confusion present             Results Review:  I have reviewed the labs, radiology results, and diagnostic studies.    Laboratory Data:   Lab Results (last 24 hours)     ** No results found for the last 24 hours. **          Culture Data:   No results found for: BLOODCX  No results found for: URINECX  No results found for: RESPCX  No results found for: WOUNDCX  No results found for: STOOLCX  No components found for: BODYFLD    Radiology Data:   Imaging Results (Last 24 Hours)     ** No results found for the last 24 hours. **           I have reviewed the patient's current medications.     Assessment/Plan     Active Hospital Problems    Diagnosis    • **Closed fracture of proximal end of left femur, initial encounter ([facility])    • Urinary retention        Confusion- unclear of patient's baseline.  We will continue to monitor.  Patient will be given Haldol as needed.  Likely has dementia at baseline.    Acute hypoxia-patient is requiring supplemental oxygen 2 L.  Continue to wean as tolerated.     Left femur fracture-status post repair, orthopedics managing, will continue with PT OT  Continue with PT OT, will determine placement     Hematuria-we will continue to monitor has a Godwin catheter.  May need urology to see him as well  Urology is also managing, urine is more clear today.  Continues with Godwin catheter.     Hypertension-continue managing blood pressure     Dyslipidemia-continue with Lipitor      pain-continue with pain control     DVT prophylaxis-SCD              Jordy Fernandez MD   12/04/22   12:23 CST

## 2022-12-04 NOTE — ANESTHESIA PREPROCEDURE EVALUATION
" Anesthesia Evaluation     Patient summary reviewed and Nursing notes reviewed   no history of anesthetic complications:  NPO Solid Status: > 8 hours  NPO Liquid Status: > 8 hours           Airway   Mallampati: II  TM distance: >3 FB  Neck ROM: full  possible difficult intubation  Dental    (+) edentulous    Pulmonary     breath sounds clear to auscultation  (+) a smoker Former, COPD moderate, decreased breath sounds,   (-) shortness of breath  Cardiovascular     ECG reviewed  Rhythm: regular  Rate: normal    (+) hypertension, past MI  >12 months, CAD, hyperlipidemia,   (-) angina, murmur, cardiac stents, CABG    ROS comment: Normal sinus rhythm with sinus arrhythmia  Right superior axis deviation  Nonspecific intraventricular block  Abnormal ECG  When compared with ECG of 11-OCT-2022 14:36,  Questionable change in QRS axis  T wave inversion now evident in Inferior leads  T wave inversion less evident in Lateral leads    Neuro/Psych  (+) dementia, poor historian.,    GI/Hepatic/Renal/Endo - negative ROS     Musculoskeletal         ROS comment: Patient confused. Mechanical fall.   Abdominal    Substance History - negative use     OB/GYN negative ob/gyn ROS         Other   arthritis,      ROS/Med Hx Other: Godwin in place;blood tinged secondary to \"pulling\" catheter out and then replaced this morning. Patient in soft restraints.                    Anesthesia Plan    ASA 4 - emergent     general   total IV anesthesia  (Discussed anesthesia plan with wife Gita @ 3914;[phone] who understands possible complications, risks and agrees. )  intravenous induction     Anesthetic plan, risks, benefits, and alternatives have been provided, discussed and informed consent has been obtained with: spouse/significant other, legal guardian and healthcare power of .  Pre-procedure education provided  Use of blood products discussed with spouse/significant other  Consented to blood products.   Plan discussed with " CRNA.        CODE STATUS:

## 2022-12-05 LAB
ANION GAP SERPL CALCULATED.3IONS-SCNC: 9 MMOL/L (ref 5–15)
BASOPHILS # BLD AUTO: 0.04 10*3/MM3 (ref 0–0.2)
BASOPHILS NFR BLD AUTO: 0.5 % (ref 0–1.5)
BUN SERPL-MCNC: 22 MG/DL (ref 8–23)
BUN/CREAT SERPL: 19.1 (ref 7–25)
CALCIUM SPEC-SCNC: 8.9 MG/DL (ref 8.6–10.5)
CHLORIDE SERPL-SCNC: 107 MMOL/L (ref 98–107)
CO2 SERPL-SCNC: 26 MMOL/L (ref 22–29)
CREAT SERPL-MCNC: 1.15 MG/DL (ref 0.76–1.27)
DEPRECATED RDW RBC AUTO: 42.2 FL (ref 37–54)
EGFRCR SERPLBLD CKD-EPI 2021: 61.6 ML/MIN/1.73
EOSINOPHIL # BLD AUTO: 0.39 10*3/MM3 (ref 0–0.4)
EOSINOPHIL NFR BLD AUTO: 5.2 % (ref 0.3–6.2)
ERYTHROCYTE [DISTWIDTH] IN BLOOD BY AUTOMATED COUNT: 12.3 % (ref 12.3–15.4)
GLUCOSE SERPL-MCNC: 118 MG/DL (ref 65–99)
HCT VFR BLD AUTO: 35 % (ref 37.5–51)
HGB BLD-MCNC: 12.2 G/DL (ref 13–17.7)
IMM GRANULOCYTES # BLD AUTO: 0.02 10*3/MM3 (ref 0–0.05)
IMM GRANULOCYTES NFR BLD AUTO: 0.3 % (ref 0–0.5)
LYMPHOCYTES # BLD AUTO: 1.66 10*3/MM3 (ref 0.7–3.1)
LYMPHOCYTES NFR BLD AUTO: 22.1 % (ref 19.6–45.3)
MCH RBC QN AUTO: 32.4 PG (ref 26.6–33)
MCHC RBC AUTO-ENTMCNC: 34.9 G/DL (ref 31.5–35.7)
MCV RBC AUTO: 93.1 FL (ref 79–97)
MONOCYTES # BLD AUTO: 0.78 10*3/MM3 (ref 0.1–0.9)
MONOCYTES NFR BLD AUTO: 10.4 % (ref 5–12)
NEUTROPHILS NFR BLD AUTO: 4.63 10*3/MM3 (ref 1.7–7)
NEUTROPHILS NFR BLD AUTO: 61.5 % (ref 42.7–76)
NRBC BLD AUTO-RTO: 0 /100 WBC (ref 0–0.2)
PLATELET # BLD AUTO: 142 10*3/MM3 (ref 140–450)
PMV BLD AUTO: 10.7 FL (ref 6–12)
POTASSIUM SERPL-SCNC: 3.3 MMOL/L (ref 3.5–5.2)
POTASSIUM SERPL-SCNC: 3.3 MMOL/L (ref 3.5–5.2)
POTASSIUM SERPL-SCNC: 4 MMOL/L (ref 3.5–5.2)
QT INTERVAL: 410 MS
QTC INTERVAL: 488 MS
RBC # BLD AUTO: 3.76 10*6/MM3 (ref 4.14–5.8)
SODIUM SERPL-SCNC: 142 MMOL/L (ref 136–145)
WBC NRBC COR # BLD: 7.52 10*3/MM3 (ref 3.4–10.8)

## 2022-12-05 PROCEDURE — 97110 THERAPEUTIC EXERCISES: CPT

## 2022-12-05 PROCEDURE — 94799 UNLISTED PULMONARY SVC/PX: CPT

## 2022-12-05 PROCEDURE — 25010000002 CEFAZOLIN PER 500 MG: Performed by: UROLOGY

## 2022-12-05 PROCEDURE — 99024 POSTOP FOLLOW-UP VISIT: CPT | Performed by: ORTHOPAEDIC SURGERY

## 2022-12-05 PROCEDURE — 85025 COMPLETE CBC W/AUTO DIFF WBC: CPT | Performed by: UROLOGY

## 2022-12-05 PROCEDURE — 25010000002 ENOXAPARIN PER 10 MG: Performed by: UROLOGY

## 2022-12-05 PROCEDURE — 80048 BASIC METABOLIC PNL TOTAL CA: CPT | Performed by: UROLOGY

## 2022-12-05 PROCEDURE — 97116 GAIT TRAINING THERAPY: CPT

## 2022-12-05 PROCEDURE — 97530 THERAPEUTIC ACTIVITIES: CPT

## 2022-12-05 PROCEDURE — 84132 ASSAY OF SERUM POTASSIUM: CPT | Performed by: FAMILY MEDICINE

## 2022-12-05 RX ORDER — POTASSIUM CHLORIDE 1.5 G/1.77G
40 POWDER, FOR SOLUTION ORAL AS NEEDED
Status: DISCONTINUED | OUTPATIENT
Start: 2022-12-05 | End: 2022-12-09 | Stop reason: HOSPADM

## 2022-12-05 RX ORDER — MAGNESIUM SULFATE HEPTAHYDRATE 40 MG/ML
2 INJECTION, SOLUTION INTRAVENOUS AS NEEDED
Status: DISCONTINUED | OUTPATIENT
Start: 2022-12-05 | End: 2022-12-09 | Stop reason: HOSPADM

## 2022-12-05 RX ORDER — MAGNESIUM SULFATE HEPTAHYDRATE 40 MG/ML
4 INJECTION, SOLUTION INTRAVENOUS AS NEEDED
Status: DISCONTINUED | OUTPATIENT
Start: 2022-12-05 | End: 2022-12-09 | Stop reason: HOSPADM

## 2022-12-05 RX ORDER — POTASSIUM CHLORIDE 750 MG/1
40 CAPSULE, EXTENDED RELEASE ORAL AS NEEDED
Status: DISCONTINUED | OUTPATIENT
Start: 2022-12-05 | End: 2022-12-09 | Stop reason: HOSPADM

## 2022-12-05 RX ADMIN — ACETAMINOPHEN 650 MG: 325 TABLET, FILM COATED ORAL at 08:20

## 2022-12-05 RX ADMIN — Medication 10 ML: at 08:19

## 2022-12-05 RX ADMIN — ENOXAPARIN SODIUM 40 MG: 100 INJECTION SUBCUTANEOUS at 08:20

## 2022-12-05 RX ADMIN — Medication 1 TABLET: at 08:19

## 2022-12-05 RX ADMIN — IPRATROPIUM BROMIDE AND ALBUTEROL SULFATE 3 ML: 2.5; .5 SOLUTION RESPIRATORY (INHALATION) at 11:00

## 2022-12-05 RX ADMIN — POTASSIUM CHLORIDE 40 MEQ: 1.5 POWDER, FOR SOLUTION ORAL at 18:39

## 2022-12-05 RX ADMIN — IPRATROPIUM BROMIDE AND ALBUTEROL SULFATE 3 ML: 2.5; .5 SOLUTION RESPIRATORY (INHALATION) at 15:50

## 2022-12-05 RX ADMIN — POTASSIUM CHLORIDE 40 MEQ: 1.5 POWDER, FOR SOLUTION ORAL at 13:46

## 2022-12-05 RX ADMIN — MELATONIN 3 MG: 3 TAB ORAL at 21:50

## 2022-12-05 RX ADMIN — ATORVASTATIN CALCIUM 40 MG: 40 TABLET, FILM COATED ORAL at 21:50

## 2022-12-05 RX ADMIN — CEFAZOLIN 2 G: 10 INJECTION, POWDER, FOR SOLUTION INTRAVENOUS at 11:47

## 2022-12-05 RX ADMIN — ASPIRIN 81 MG: 81 TABLET, CHEWABLE ORAL at 08:19

## 2022-12-05 RX ADMIN — RIVASTIGMINE TRANSDERMAL SYSTEM 1 PATCH: 4.6 PATCH, EXTENDED RELEASE TRANSDERMAL at 08:22

## 2022-12-05 RX ADMIN — CEFAZOLIN 2 G: 10 INJECTION, POWDER, FOR SOLUTION INTRAVENOUS at 02:48

## 2022-12-05 RX ADMIN — ESCITALOPRAM 5 MG: 5 TABLET, FILM COATED ORAL at 08:19

## 2022-12-05 NOTE — PROGRESS NOTES
ORTHOPEDIC PROGRESS NOTE:    Name:  Teofilo Brown  Date:    2022  Date of admission:  2022    Post op day:  1 Day Post-Op  Procedure:    Procedure(s) (LRB):  CYSTOSCOPY (Bilateral)    Subjective:  No new complaints  No fever or chills      Vitals:     Vitals:    22 0643   BP:    Pulse: 81   Resp:    Temp:    SpO2:       Temp (24hrs), Av.3 °F (36.3 °C), Min:96.8 °F (36 °C), Max:98 °F (36.7 °C)      Exam:  Awake and alert to person and place and context  Toes up and down  Calves soft and nontender  Incisions clean and dry  Stable exam    Results from last 7 days   Lab Units 22  0604 22  1411 22  0701   WBC 10*3/mm3 7.52 8.34 9.78   HEMOGLOBIN g/dL 12.2* 12.3* 13.0   HEMATOCRIT % 35.0* 36.8* 38.2   PLATELETS 10*3/mm3 142 141 110*         ASSESSMENT:  Active Hospital Problems    Diagnosis  POA   • **Closed fracture of proximal end of left femur, initial encounter ([facility]) [S72.002A]  Yes   • Urinary retention [R33.9]  Unknown     Added automatically from request for surgery [id number]           PLAN:    S/p IM agustin left hip  Mobilize with PT/OT  DVT prophylaxis - lovenox  No true restrictions  Disposition - SNF   Staples can be removed POD 14   No restrictions   Advance weight bearing as tolerated.   Get mobile xray at 2 weeks postop and send to ortho office.   F/u in ortho in 4 weeks (can be sooner if felt necessary by SNF provider)        22 at 07:19 CST by Juan Salas MD

## 2022-12-05 NOTE — PLAN OF CARE
Goal Outcome Evaluation:   VSS. Pt potassium replaced today, medicated for pain today prior to PTOT. Pt with no c/o pain for remainder of shift when asked. Neuro checks WDL. End of shift pt began to experience sundown syndrome and pulled gown and tele off and removed IV. Spouse reports this is baseline behavior normally between 1700 and 2200. Pt changed and eating dinner with no issues at this time.

## 2022-12-05 NOTE — THERAPY TREATMENT NOTE
Acute Care - Physical Therapy Treatment Note  [facility]     Patient Name: Teofilo Brown  : 1935  MRN: [id number]  Today's Date: 2022      Visit Dx:     ICD-10-CM ICD-9-CM   1. Closed fracture of proximal end of left femur, initial encounter ([facility])  S72.002A 820.8   2. Fall, initial encounter  W19.XXXA E888.9   3. Urinary retention  R33.9 788.20   4. Impaired mobility and ADLs  Z74.09 V49.89    Z78.9    5. Impaired functional mobility, balance, gait, and endurance  Z74.09 V49.89   6. 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[facility], due to cerebrovascular dz)   • Cerebrovascular disease   • COPD (chronic obstructive pulmonary disease) ([facility])   • Benign essential HTN   • HLD (hyperlipidemia)   • CAD (coronary artery disease)   • Closed fracture of proximal end of left femur, initial encounter ([facility])   • Urinary retention     Past Medical History:   Diagnosis Date   • Dementia ([facility])    • Hyperlipidemia    • Myocardial infarction ([facility])      Past Surgical History:   Procedure Laterality Date   • CAROTID STENT       PT Assessment (last 12 hours)     PT Evaluation and Treatment     Row Name 22 1320          Physical Therapy Time and Intention    Document Type therapy note (daily note)  -CA     Mode of Treatment physical therapy  -CA     Row Name 22 1320          General Information    Existing Precautions/Restrictions fall  -CA     Row Name 22 1320          Cognition    Orientation Status (Cognition) oriented to;person  -CA     Row Name 22 1320          Mobility    Extremity Weight-bearing Status left lower extremity  -CA     Left Lower Extremity (Weight-bearing Status) weight-bearing as tolerated (WBAT)  -CA     Row Name 22 1320          Bed Mobility    Bed Mobility supine-sit;sit-supine  -CA     Scooting/Bridging [place] (Bed Mobility)  minimum assist (75% patient effort);moderate assist (50% patient effort)  -CA     Supine-Sit [place] (Bed Mobility) moderate assist (50% patient effort)  -CA     Sit-Supine [place] (Bed Mobility) moderate assist (50% patient effort);2 person assist  -CA     Assistive Device (Bed Mobility) bed rails  -CA     Row Name 12/05/22 1320          Transfers    Transfers sit-stand transfer;stand-sit transfer;toilet transfer  -CA     Row Name 12/05/22 1320          Sit-Stand Transfer    Sit-Stand [place] (Transfers) moderate assist (50% patient effort)  -CA     Assistive Device (Sit-Stand Transfers) walker, front-wheeled  -CA     Row Name 12/05/22 1320          Stand-Sit Transfer    Stand-Sit [place] (Transfers) moderate assist (50% patient effort)  -CA     Assistive Device (Stand-Sit Transfers) walker, front-wheeled  -CA     Row Name 12/05/22 1320          Toilet Transfer    Type (Toilet Transfer) sit-stand;stand-sit  -CA     [place] Level (Toilet Transfer) moderate assist (50% patient effort)  -CA     Assistive Device (Toilet Transfer) walker, front-wheeled  -CA     Row Name 12/05/22 1320          Gait/Stairs (Locomotion)    [place] Level (Gait) moderate assist (50% patient effort)  -CA     Assistive Device (Gait) walker, front-wheeled  -CA     Distance in Feet (Gait) 6' and 3' x 2  -CA     Pattern (Gait) step-to  -CA     Deviations/Abnormal Patterns (Gait) antalgic;stride length decreased;araceli decreased  -CA     Bilateral Gait Deviations forward flexed posture  -CA     Row Name 12/05/22 1320          Motor Skills    Therapeutic Exercise knee;ankle  -CA     Row Name 12/05/22 1320          Knee (Therapeutic Exercise)    Knee (Therapeutic Exercise) AROM (active range of motion)  -CA     Knee AROM (Therapeutic Exercise) bilateral;LAQ (long arc quad)  -CA     Row Name 12/05/22 1320          Ankle (Therapeutic Exercise)    Ankle (Therapeutic Exercise) AROM (active range of motion)  -CA     Ankle  AROM (Therapeutic Exercise) bilateral;dorsiflexion;plantarflexion  -CA     Row Name             Wound 12/02/22 Left hip Incision    Wound - Properties Group Placement Date: 12/02/22  -JM Present on Hospital Admission: N  -JM Side: Left  -JM Location: hip  -JM Primary Wound Type: Incision  -JM    Retired Wound - Properties Group Placement Date: 12/02/22  -JM Present on Hospital Admission: N  -JM Side: Left  -JM Location: hip  -JM Primary Wound Type: Incision  -JM    Retired Wound - Properties Group Date first assessed: 12/02/22  -JM Present on Hospital Admission: N  -JM Side: Left  -JM Location: hip  -JM Primary Wound Type: Incision  -JM    Row Name 12/05/22 1320          Positioning and Restraints    Pre-Treatment Position in bed  -CA     Post Treatment Position bed  -CA     In Bed fowlers;call light within reach;encouraged to call for assist;exit alarm on;with nsg  -CA           User Key  (r) = Recorded By, (t) = Taken By, (c) = Cosigned By    Initials Name Provider Type    Go Phelps PTA Physical Therapist Assistant    Eugenia Harrington, RN Registered Nurse                Physical Therapy Education     Title: PT OT SLP Therapies (In Progress)     Topic: Physical Therapy (In Progress)     Point: Mobility training (In Progress)     Learning Progress Summary           Patient Acceptance, E, NR by LORENA at 12/4/2022 1222    Acceptance, E,TB, VU by LR at 12/2/2022 1458    Comment: Educated on PT POC and goals.                   Point: Home exercise program (Done)     Learning Progress Summary           Patient Acceptance, E,TB, VU by LR at 12/2/2022 1458    Comment: Educated on PT POC and goals.                   Point: Body mechanics (Done)     Learning Progress Summary           Patient Acceptance, E,TB, VU by LR at 12/2/2022 1458    Comment: Educated on PT POC and goals.                   Point: Precautions (Done)     Learning Progress Summary           Patient Acceptance, E,TB, VU by LR at 12/2/2022 1458     Comment: Educated on PT POC and goals.                               User Key     Initials Effective Dates Name Provider Type Discipline    LORENA 06/16/21 -  Percy Camacho PTA Physical Therapist Assistant PT    LR 06/16/21 -  Milan Cole Physical Therapist PT              PT Recommendation and Plan     Plan of Care Reviewed With: patient  Progress: improving  Outcome Evaluation: pt. was mod assist for sup to sit transfer and mod assist x 2 for sit to supine to help get legs up in bed.  Pt. t/f sit to stand with mod assist x 1 from bed and from [facility] and use of RW.  Pt. amb. 6' and then 3' x 2 with RW and mod assist x 1 with antalgic gait and decreased step length and decreased araceli.  Pt. also performed LAQ and AP seated EOB with AROM.   No new goals met and pt. improving.  Pt. would need continued skilled PT and inpt. rehab vs SNF for continued PT.   Outcome Measures     Row Name 12/05/22 1320 12/04/22 1057          How much help from another person do you currently need...    Turning from your back to your side while in flat bed without using bedrails? 2  -CA 2  -LORENA     Moving from lying on back to sitting on the side of a flat bed without bedrails? 2  -CA 2  -LORENA     Moving to and from a bed to a chair (including a wheelchair)? 2  -CA 2  -LORENA     Standing up from a chair using your arms (e.g., wheelchair, bedside chair)? 2  -CA 2  -LORENA     Climbing 3-5 steps with a railing? 1  -CA 1  -LORENA     To walk in hospital room? 2  -CA 2  -LORENA     AM-PAC 6 Clicks Score (PT) 11  -CA 11  -LORENA        Functional Assessment    Outcome Measure Options AM-PAC 6 Clicks Basic Mobility (PT)  -CA AM-PAC 6 Clicks Basic Mobility (PT)  -LORENA           User Key  (r) = Recorded By, (t) = Taken By, (c) = Cosigned By    Initials Name Provider Type    CA Go Rivera PTA Physical Therapist Assistant     Percy Camacho PTA Physical Therapist Assistant                 Time Calculation:    PT Charges     Row Name 12/05/22 0036              Time Calculation    Start Time 1320  -CA      Stop Time 1350  -CA      Time Calculation (min) 30 min  -CA      PT Received On 12/05/22  -CA         Time Calculation- PT    Total Timed Code Minutes- PT 30 minute(s)  -CA            User Key  (r) = Recorded By, (t) = Taken By, (c) = Cosigned By    Initials Name Provider Type    CA Go Rivera PTA Physical Therapist Assistant              Therapy Charges for Today     Code Description Service Date Service Provider Modifiers Qty    80063168994 HC PT THERAPEUTIC ACT EA 15 MIN 12/5/2022 Go Rivera PTA GP 1    16117760618 HC GAIT TRAINING EA 15 MIN 12/5/2022 Go Rivera PTA GP 1          PT G-Codes  Outcome Measure Options: AM-PAC 6 Clicks Daily Activity (OT)  AM-PAC 6 Clicks Score (PT): 11  AM-PAC 6 Clicks Score (OT): 12    Go Rivera PTA  12/5/2022

## 2022-12-05 NOTE — PLAN OF CARE
Goal Outcome Evaluation:            The Patient has been pleasant, resting, vss, no complaints of pain

## 2022-12-05 NOTE — PROGRESS NOTES
[facility] Medicine Services  INPATIENT PROGRESS NOTE    Length of Stay: 4  Date of Admission: 12/1/2022  Primary Care Physician: Nicole Burgos APRN    Subjective   Chief Complaint: Confusion  HPI:  No new concerns reported, intermittently agitated at times.      Review of Systems   Unable to perform ROS: Dementia      All pertinent negatives and positives are as above. All other systems have been reviewed and are negative unless otherwise stated.     Objective    As of today 12/05/22  Temp:  [96.8 °F (36 °C)-98 °F (36.7 °C)] 97.6 °F (36.4 °C)  Heart Rate:  [80-96] 90  Resp:  [18] 18  BP: (118-155)/(51-85) 155/85    Physical Exam  Constitutional:       General: He is not in acute distress.     Appearance: He is not toxic-appearing.   HENT:      Head: Normocephalic and atraumatic.      Right Ear: External ear normal.      Left Ear: External ear normal.      Nose: Nose normal.      Mouth/Throat:      Mouth: Mucous membranes are moist.   Eyes:      Conjunctiva/sclera: Conjunctivae normal.   Cardiovascular:      Rate and Rhythm: Normal rate and regular rhythm.      Pulses: Normal pulses.      Heart sounds: Normal heart sounds.   Pulmonary:      Effort: Pulmonary effort is normal. No respiratory distress.      Breath sounds: Normal breath sounds.   Abdominal:      General: Bowel sounds are normal.      Palpations: Abdomen is soft.      Tenderness: There is no abdominal tenderness.   Musculoskeletal:         General: No deformity.   Skin:     General: Skin is warm and dry.      Capillary Refill: Capillary refill takes less than 2 seconds.   Neurological:      General: No focal deficit present.           Results Review:  I have reviewed the labs, radiology results, and diagnostic studies.    Laboratory Data:   Results from last 7 days   Lab Units 12/05/22  1136 12/05/22  0604 12/04/22  1411 12/03/22  0701 12/02/22  0708   SODIUM mmol/L  --  142 143 146* 143   POTASSIUM mmol/L  3.3* 3.3* 3.4* 3.6 3.3*   CHLORIDE mmol/L  --  107 108* 109* 107   CO2 mmol/L  --  26.0 24.0 24.0 22.0   BUN mg/dL  --  22 26* 24* 20   CREATININE mg/dL  --  1.15 1.28* 1.34* 1.15   GLUCOSE mg/dL  --  118* 121* 119* 156*   CALCIUM mg/dL  --  8.9 8.7 9.3 9.6   BILIRUBIN mg/dL  --   --  0.3 0.5 0.9   ALK PHOS U/L  --   --  80 80 93   ALT (SGPT) U/L  --   --  <5 9 14   AST (SGOT) U/L  --   --  31 29 21   ANION GAP mmol/L  --  9.0 11.0 13.0 14.0     Estimated Creatinine Clearance: 37.3 mL/min (by C-G formula based on SCr of 1.15 mg/dL).  Results from last 7 days   Lab Units 12/02/22  0708   MAGNESIUM mg/dL 1.5*         Results from last 7 days   Lab Units 12/05/22  0604 12/04/22  1411 12/03/22  0701 12/02/22  1318 12/02/22  0708 12/01/22  2145   WBC 10*3/mm3 7.52 8.34 9.78  --  11.06* 11.40*   HEMOGLOBIN g/dL 12.2* 12.3* 13.0 14.4 14.5 14.6   HEMATOCRIT % 35.0* 36.8* 38.2 42.6 42.3 42.0   PLATELETS 10*3/mm3 142 141 110*  --  145 150     Results from last 7 days   Lab Units 12/01/22  2338   INR  1.21*       Culture Data:   No results found for: BLOODCX  No results found for: URINECX  No results found for: RESPCX  No results found for: WOUNDCX  No results found for: STOOLCX  No components found for: BODYFLD    Radiology Data:   Imaging Results (Last 24 Hours)     ** No results found for the last 24 hours. **          I have reviewed the patient's current medications.     Assessment/Plan     Principal Problem:    Closed fracture of proximal end of left femur, initial encounter ([facility])  Active Problems:    Urinary retention  Dementia    Plan:  -Appreciate orthopedic surgery assistance  - Status post intramedullary rodding of the left hip on 12/2/2022  - Family is already been working on placement at [facility], discussed with CM who will check to see if he can still go there as planned.   -Appreciate urology assistance, Godwin catheter to stay in at discharge  - Continue home medications as appropriate  - Continue working with  PT and OT  - DVT prophylaxis with Lovenox  - CODE STATUS: Full      I confirmed that the patient's Advance Care Plan is present, code status is documented, or surrogate decision maker is listed in the patient's medical record.      Discharge Planning: In process.    Syd Abreu MD

## 2022-12-05 NOTE — DISCHARGE PLACEMENT REQUEST
"Teofilo Brown (87 y.o. Male)     Date of Birth   1935    Social Security Number       Address   [address]  [facility] [address]    Home Phone   [phone]    MRN   [id number]       Lutheran   None    Marital Status                               Admission Date   12/1/22    Admission Type   Emergency    Admitting Provider   Jordy Fernandez MD    Attending Provider   Jordy Fernandez MD    Department, Room/Bed   [address], 433/1       Discharge Date       Discharge Disposition       Discharge Destination                               Attending Provider: Jordy Fernandez MD    Allergies: Morphine, Tramadol    Isolation: None   Infection: None   Code Status: CPR    Ht: 165.1 cm (65\")   Wt: 58.3 kg (128 lb 9.6 oz)    Admission Cmt: None   Principal Problem: Closed fracture of proximal end of left femur, initial encounter ([facility]) [S72.002A]                 Active Insurance as of 12/1/2022     Primary Coverage     Payor Plan Insurance Group Employer/Plan Group    [facility] MEDICARE REPLACEMENT [facility] MEDICARE REPLACEMENT [id number]     Payor Plan Address Payor Plan Phone Number Payor Plan Fax Number Effective Dates    [address] [address]   1/1/2022 - None Entered    [facility] [id number]       Subscriber Name Subscriber Birth Date Member ID       TEOFILO BROWN 1935 [id number]                 Emergency Contacts      (Rel.) Home Phone Work Phone Mobile Phone    BAM BROWN (Spouse) [phone] -- --               History & Physical      Behroozi, Saeid, MD at 12/01/22 [address] [place] [address] Medicine Admission      Date of Admission: 12/1/2022      Primary Care Physician: Nicole Burgos APRN      Chief Complaint: Fall    HPI:    Patient is a 87-year-old male with known past medical history of dementia myocardial infarction and history of 2 stent placement, does " not follow with any particular cardiologist, hyperlipidemia, hypertension, who was taken of blood pressure medication sometime ago concerning normal blood pressures, as history of poor gait supposed to use walker, which she does not use this at home with his wife to ER via EMS after a fall with left hip pain.  In ER patient was diagnosed with left hip fracture.  Orthopedic service was consulted.  Patient was found to have urinary retention, with bladder scan of 1000 cc .  Godwin catheter was placed in ED. he had elevated blood pressures in ED.  He was given analgesics in ED for pain control.  Hospitalist service was called for admission of the patient.  Discussed the care with as needed Paloma.    I have seen and examined this patient in ED room 4.  His wife at bedside.  She does not provide any specific information in regard of his fall or any active complaint, including no left hip pain patient does talk about nonrelated issues.  Per report of his wife Gita at bedside patient got up from his chair walked few steps without walker, lost his balance and fell subsequently left side.  His wife was present.  Per his wife he did not have any head trauma or loss of consciousness.  Patient subsequently had left hip pain.  There is no history of recent urinary retention.  His wife he did not urinate since his fall.  As above mentioned no particular specific information is available from the patient by presence of dementia    Concurrent Medical History:  has a past medical history of Dementia (HCC), Hyperlipidemia, and Myocardial infarction (HCC).    Past Surgical History:  has a past surgical history that includes Carotid stent.    Family History: family history is not on file.     Social History:  reports that he has never smoked. He does not have any smokeless tobacco history on file. He reports that he does not currently use alcohol. He reports that he does not use drugs.    Allergies:   Allergies   Allergen Reactions    • Morphine Unknown - High Severity   • Tramadol Unknown - High Severity       Medications:   Prior to Admission medications    Medication Sig Start Date End Date Taking? Authorizing Provider   albuterol sulfate  (90 Base) MCG/ACT inhaler Inhale 2 puffs Every 4 (Four) Hours As Needed for Shortness of Air or Wheezing. 1/1/22   Janina Motley APRN   aspirin 81 MG chewable tablet Chew 81 mg Daily. 1/1/22   Janina Motley APRN   atorvastatin (LIPITOR) 40 MG tablet Take 40 mg by mouth Every Night. 2/15/22   Janina Motley APRN   escitalopram (LEXAPRO) 5 MG tablet Take 1 tablet by mouth Daily. Indications: Major Depressive Disorder 10/20/22   Terry Drummond MD   fluticasone-salmeterol (ADVAIR HFA) 115-21 MCG/ACT inhaler Inhale 1 puff 2 (Two) Times a Day. 1/1/22   Janina Motley APRN   multivitamin with minerals tablet tablet Take 1 tablet by mouth Daily. 1/1/22   Janina Motley APRN   nitroglycerin (NITROSTAT) 0.4 MG SL tablet Place 0.4 mg under the tongue Every 5 (Five) Minutes As Needed. 2/14/22   Janina Motley APRN   rivastigmine (EXELON) 4.6 MG/24HR patch Place 1 patch on the skin as directed by provider Daily. Indications: Alzheimer's Disease 10/20/22   Terry Drummond MD   Vitamin D, Cholecalciferol, (CHOLECALCIFEROL) 10 MCG (400 UNIT) tablet Take 400 Units by mouth Daily. 1/1/22   Janina Motley APRN       Review of Systems:  Review of Systems   Otherwise complete ROS is negative except as mentioned above.  Review of systems not obtainable from the patient secondary to his dementia.    Physical Exam:   Temp:  [97.6 °F (36.4 °C)] 97.6 °F (36.4 °C)  Heart Rate:  [82-86] 84  Resp:  [20] 20  BP: (191-197)/(90-93) 191/90  Physical Exam  Constitutional:       General: He is not in acute distress.     Appearance: He is normal weight. He is not ill-appearing, toxic-appearing or diaphoretic.   HENT:      Head: Normocephalic and  atraumatic.      Right Ear: External ear normal.      Left Ear: External ear normal.      Nose: Nose normal.      Mouth/Throat:      Mouth: Mucous membranes are dry.      Pharynx: Oropharynx is clear.   Eyes:      Extraocular Movements: Extraocular movements intact.      Conjunctiva/sclera: Conjunctivae normal.      Pupils: Pupils are equal, round, and reactive to light.   Cardiovascular:      Rate and Rhythm: Normal rate and regular rhythm.      Heart sounds:     No friction rub. No gallop.   Pulmonary:      Effort: No respiratory distress.      Breath sounds: No stridor. No wheezing or rales.   Chest:      Chest wall: No tenderness.   Abdominal:      General: Abdomen is flat. There is no distension.      Palpations: Abdomen is soft.      Tenderness: There is no abdominal tenderness. There is no guarding or rebound.   Musculoskeletal:         General: No swelling or tenderness.      Cervical back: No rigidity or tenderness.      Right lower leg: No edema.      Left lower leg: No edema.      Comments: Injury of movement muscular strength examination to left lower extremity deferred.  Left lower extremity is in knee flexion position   Lymphadenopathy:      Cervical: No cervical adenopathy.   Skin:     General: Skin is warm and dry.      Coloration: Skin is not jaundiced.      Findings: No erythema.   Neurological:      Mental Status: He is alert. Mental status is at baseline.      Sensory: No sensory deficit.      Motor: No weakness.      Coordination: Coordination normal.   Psychiatric:         Mood and Affect: Mood normal.         Behavior: Behavior normal.         Judgment: Judgment normal.           Results Reviewed:  I have personally reviewed current lab, radiology, and data and agree with results.  Lab Results (last 24 hours)     Procedure Component Value Units Date/Time    Urinalysis With Microscopic If Indicated (No Culture) - Urine, Clean Catch [[id number]] Collected: 12/01/22 2129    Specimen: Urine, Clean  Catch Updated: 12/01/22 2133        Imaging Results (Last 24 Hours)     Procedure Component Value Units Date/Time    CT Pelvis Without Contrast [[id number]] Collected: 12/01/22 1941     Updated: 12/01/22 2053    Narrative:      EXAM:  CT PELVIS WITHOUT IV CONTRAST    ORDERING PROVIDER:  JOSHUA NANCE    CLINICAL HISTORY:  Pain on the left side    COMPARISON STUDY:  Radiograph of the same date    TECHNIQUE:  A multislice scan was obtained of the pelvic girdle and left hip  in the axial plane without IV contrast. Reformatted images were  generated in the sagittal and coronal planes.   This exam was performed according to our departmental  dose-optimization program, which includes automated exposure  control, adjustment of the mA and/or kV according to the  patient's size and/or use of iterative reconstruction technique.     FINDINGS:  Discontinuity of the cortex of the base of the femoral neck on  axial image 61 along both the lateral and medial margin, with a  subtle lucency visualized on coronal image 35, and axial image  64, consistent with nondisplaced fracture of the left proximal  femur along the intratrochanteric region.    Buckling of the left inferior pubic ramus on axial image 66, and  along the lateral aspect of the left superior pubic ramus on  axial image 49 and this is due to fracture of indeterminate age.  Coexisting fracture deformity of the left lateral sacral ala on  axial image 29.    There is significant distention of the urinary bladder, and this  patient may benefit from catheterization of the urinary bladder  for decompression.      Impression:      Acute nondisplaced fracture of the left proximal femur along the  intratrochanteric region.  Fracture deformity of indeterminate age involving the left pubic  rami and lateral aspect of the left sacral ala.  There is significant distention of the urinary bladder, and this  patient may benefit from catheterization of the urinary bladder  for  decompression.    Electronically signed by:  Panfilo Horton MD  12/1/2022 8:50 PM CST  Workstation: [id number]    XR Foot 3+ View Left [[id number]] Collected: 12/01/22 2007     Updated: 12/01/22 2038    Narrative:      XR FOOT 3 OR MORE VIEWS    COMPARISONS: None.    ADDITIONAL PERTINENT HISTORY: Fall with pain    FINDINGS:       Osseous structures: Mild diffuse osteopenia. No bony fractures.    Joint spaces: Negative.    Surrounding soft tissues: Negative.      Impression:      1. Diffuse osteopenia.  2. No acute appearing bony abnormalities.    Electronically signed by:  Darin Michel MD  12/1/2022 8:35 PM CST  Workstation: [id number]    XR Hip With or Without Pelvis 2 - 3 View Left [[id number]] Collected: 12/01/22 1815     Updated: 12/01/22 1844    Narrative:      EXAM DESCRIPTION: XR HIP W OR WO PELVIS 2-3 VIEW LEFT 12/1/2022  6:15 PM CST    CLINICAL HISTORY:Fall, injury    COMPARISON: None.    TECHNIQUE:     3 views of the left hip.     FINDINGS:     Osteopenia.    No acute fracture or traumatic malalignment.    Postsurgical changes of the right acetabulum and superior pubic  ramus.    Surgical clips projecting over the left pelvis.    Pelvic phleboliths.       Impression:      Osteopenia. No acute fracture or traumatic malalignment.    Electronically signed by:  Meir Vu MD  12/1/2022 6:42 PM  CST Workstation: [id number]            Assessment:    Active Hospital Problems    Diagnosis    • **Closed fracture of proximal end of left femur, initial encounter ([facility])      # Mechanical fall  # Left hip fracture secondary to fall  # Urinary retention is status post catheter placed  # Dementia  # History of coronary artery disease, myocardial infarction and stent placement  # Hypertension,, uncontrolled  # History of hyperlipidemia   # Baseline unsteady gait  # Possible history of COPD considering list of home medication          Plan:    Placed on telemetry  No laboratory work-up available.  We will obtain a stat CBC CMP  PT/INR  Obtain further laboratory work-up  Obtain UA and if UA abnormal urine culture.  If UA significantly abnormal placed on antibiotic.  Continue with Godwin catheter.  Obtain EKG  Obtain echocardiogram  Analgesics as needed laxative as needed,   N.p.o. past midnight pending orthopedic service evaluation  Placed on low rate IV fluid upon availability of laboratory work-up  Avoid polypharmacy excessive use of sedative narcotics, medication with significant anticholinergic effect considering dementia  Placed on labetalol as needed for significantly elevated blood pressures.  Avoid the strict blood pressure control.  See response of uncontrolled hypertension to Godwin catheter placement and resolution of urinary retention  PT OT eval will be obtained following orthopedic evaluation and potential surgical intervention  Comorbidities, chronic medical problems will be treated appropriately  DVT and GI prophylaxis will be initiated  Reconcile home medication continue with essential home medications.  Please see orders for comprehensive plan  Prognosis guarded.  I discussed medical care with the ED provider Paloma.    I confirmed that the patient's Advance Care Plan is present, code status is documented, or surrogate decision maker is listed in the patient's medical record.   Patient cannot contribute to discussion about CODE STATUS and advanced directive by presence of dementia.  Per his wife Gita patient has advanced directive and she was not sure about patient's wishes in regard of CODE STATUS.  She decided for full code for mediate care ending further decision making by his wife and review of his advanced directive form.  I have utilized all available immediate resources to obtain, update, or review the patient's current medications.     I discussed the patient's findings and my recommendations with: Patient's wife and she agreed with above plan of care.      Saeid Behroozi, MD   12/01/22   21:35  CST                Electronically signed by Behroozi, Saeid, MD at 12/02/22 0611         Current Facility-Administered Medications   Medication Dose Route Frequency Provider Last Rate Last Admin   • acetaminophen (TYLENOL) tablet 650 mg  650 mg Oral Q6H PRN Juan Giang MD   650 mg at 12/05/22 0820   • albuterol (PROVENTIL) nebulizer solution 0.083% 2.5 mg/3mL  2.5 mg Nebulization Q4H PRN Juan Giang MD       • aspirin chewable tablet 81 mg  81 mg Oral Daily Juan Giang MD   81 mg at 12/05/22 0819   • atorvastatin (LIPITOR) tablet 40 mg  40 mg Oral Nightly Juan Giang MD   40 mg at 12/04/22 2034   • budesonide-formoterol (SYMBICORT) 80-4.5 MCG/ACT inhaler 2 puff  2 puff Inhalation BID - RT Juan Giang MD   2 puff at 12/02/22 0945   • Enoxaparin Sodium (LOVENOX) syringe 40 mg  40 mg Subcutaneous Daily Juan Giang MD   40 mg at 12/05/22 0820   • escitalopram (LEXAPRO) tablet 5 mg  5 mg Oral Daily Juan Giang MD   5 mg at 12/05/22 0819   • ipratropium-albuterol (DUO-NEB) nebulizer solution 3 mL  3 mL Nebulization 4x Daily - RT Juan Giang MD   3 mL at 12/05/22 1100   • Magnesium Sulfate 2 gram Bolus, followed by 8 gram infusion (total Mg dose 10 grams)- Mg less than or equal to 1mg/dL  2 g Intravenous PRN Syd Abreu MD        Or   • Magnesium Sulfate 2 gram / 50mL Infusion (GIVE X 3 BAGS TO EQUAL 6GM TOTAL DOSE) - Mg 1.1 - 1.5 mg/dl  2 g Intravenous PRN Syd Abreu MD        Or   • Magnesium Sulfate 4 gram infusion- Mg 1.6-1.9 mg/dL  4 g Intravenous PRN Syd Abreu MD       • melatonin tablet 3 mg  3 mg Oral Nightly PRN Juan Giang MD   3 mg at 12/04/22 2034   • multivitamin with minerals 1 tablet  1 tablet Oral Daily Juan Giang MD   1 tablet at 12/05/22 0819   • nitroglycerin (NITROSTAT) SL tablet 0.4 mg  0.4 mg Sublingual Q5 Min PRN Juan Giang MD       • ondansetron (ZOFRAN) tablet 4 mg  4 mg Oral Q6H PRN Juan Giang MD        Or  "  • ondansetron (ZOFRAN) injection 4 mg  4 mg Intravenous Q6H PRN Juan Giang MD       • potassium chloride (KLOR-CON) packet 40 mEq  40 mEq Oral PRN Syd Abreu MD   40 mEq at 12/05/22 1346   • potassium chloride (MICRO-K) CR capsule 40 mEq  40 mEq Oral PRN Syd Abreu MD       • rivastigmine (EXELON) 4.6 MG/24HR patch 1 patch  1 patch Transdermal Daily Juan Giang MD   1 patch at 12/05/22 0822   • sennosides-docusate (PERICOLACE) 8.6-50 MG per tablet 2 tablet  2 tablet Oral BID PRN Juan Giang MD       • sodium chloride 0.9 % flush 10 mL  10 mL Intravenous Q12H Juan Giang MD   10 mL at 12/05/22 0819   • sodium chloride 0.9 % flush 10 mL  10 mL Intravenous PRN Juan Giang MD       • sodium chloride 0.9 % infusion 40 mL  40 mL Intravenous PRN Juan Giang MD            Physician Progress Notes (most recent note)      Rhea Velasquez APRN at 12/05/22 0855             LOS: 4 days   Patient Care Team:  Nicole Burgos APRN as PCP - General (Urgent Care)    Subjective     Subjective:  Symptoms:  Stable.  (No hematuria, TMAX 97.6).        History taken from: patient chart    Objective     Vital Signs  Temp:  [96.8 °F (36 °C)-98 °F (36.7 °C)] 97.6 °F (36.4 °C)  Heart Rate:  [80-96] 91  Resp:  [16-18] 18  BP: (118-155)/(51-85) 155/85    Objective:  General Appearance:  In no acute distress.    Vital signs: (most recent): Blood pressure 155/85, pulse 91, temperature 97.6 °F (36.4 °C), temperature source Temporal, resp. rate 18, height 165.1 cm (65\"), weight 58.3 kg (128 lb 9.6 oz), SpO2 93 %.  Vital signs are normal.  No fever.    Output: Producing urine (Godwin clear dark yellow urine).    Lungs:  Normal effort and normal respiratory rate.    Abdomen: Abdomen is soft and non-distended.  There is no abdominal tenderness.     Neurological: Patient is alert.    Skin:  Warm, dry and pale.              Results Review:    Lab Results (last 24 hours)     Procedure Component Value Units " Date/Time    Basic Metabolic Panel [[id number]]  (Abnormal) Collected: 12/05/22 0604    Specimen: Blood Updated: 12/05/22 0700     Glucose 118 mg/dL      BUN 22 mg/dL      Creatinine 1.15 mg/dL      Sodium 142 mmol/L      Potassium 3.3 mmol/L      Chloride 107 mmol/L      CO2 26.0 mmol/L      Calcium 8.9 mg/dL      BUN/Creatinine Ratio 19.1     Anion Gap 9.0 mmol/L      eGFR 61.6 mL/min/1.73      Comment: National Kidney Foundation and American Society of Nephrology (ASN) Task Force recommended calculation based on the Chronic Kidney Disease Epidemiology Collaboration (CKD-EPI) equation refit without adjustment for race.       Narrative:      GFR Normal >60  Chronic Kidney Disease <60  Kidney Failure <15    The GFR formula is only valid for adults with stable renal function between ages 18 and 70.    CBC & Differential [[id number]]  (Abnormal) Collected: 12/05/22 0604    Specimen: Blood Updated: 12/05/22 0652    Narrative:      The following orders were created for panel order CBC & Differential.  Procedure                               Abnormality         Status                     ---------                               -----------         ------                     CBC Auto Differential[[id number]]        Abnormal            Final result               Scan Slide[[id number]]                                                                    Please view results for these tests on the individual orders.    CBC Auto Differential [[id number]]  (Abnormal) Collected: 12/05/22 0604    Specimen: Blood Updated: 12/05/22 0652     WBC 7.52 10*3/mm3      RBC 3.76 10*6/mm3      Hemoglobin 12.2 g/dL      Hematocrit 35.0 %      MCV 93.1 fL      MCH 32.4 pg      MCHC 34.9 g/dL      RDW 12.3 %      RDW-SD 42.2 fl      MPV 10.7 fL      Platelets 142 10*3/mm3      Neutrophil % 61.5 %      Lymphocyte % 22.1 %      Monocyte % 10.4 %      Eosinophil % 5.2 %      Basophil % 0.5 %      Immature Grans % 0.3 %      Neutrophils, Absolute 4.63  10*3/mm3      Lymphocytes, Absolute 1.66 10*3/mm3      Monocytes, Absolute 0.78 10*3/mm3      Eosinophils, Absolute 0.39 10*3/mm3      Basophils, Absolute 0.04 10*3/mm3      Immature Grans, Absolute 0.02 10*3/mm3      nRBC 0.0 /100 WBC     Comprehensive Metabolic Panel [[id number]]  (Abnormal) Collected: 12/04/22 1411    Specimen: Blood Updated: 12/04/22 1538     Glucose 121 mg/dL      BUN 26 mg/dL      Creatinine 1.28 mg/dL      Sodium 143 mmol/L      Potassium 3.4 mmol/L      Chloride 108 mmol/L      CO2 24.0 mmol/L      Calcium 8.7 mg/dL      Total Protein 5.7 g/dL      Albumin 3.20 g/dL      ALT (SGPT) <5 U/L      AST (SGOT) 31 U/L      Alkaline Phosphatase 80 U/L      Total Bilirubin 0.3 mg/dL      Globulin 2.5 gm/dL      A/G Ratio 1.3 g/dL      BUN/Creatinine Ratio 20.3     Anion Gap 11.0 mmol/L      eGFR 54.2 mL/min/1.73      Comment: National Kidney Foundation and American Society of Nephrology (ASN) Task Force recommended calculation based on the Chronic Kidney Disease Epidemiology Collaboration (CKD-EPI) equation refit without adjustment for race.       Narrative:      GFR Normal >60  Chronic Kidney Disease <60  Kidney Failure <15    The GFR formula is only valid for adults with stable renal function between ages 18 and 70.    CBC & Differential [[id number]]  (Abnormal) Collected: 12/04/22 1411    Specimen: Blood Updated: 12/04/22 1522    Narrative:      The following orders were created for panel order CBC & Differential.  Procedure                               Abnormality         Status                     ---------                               -----------         ------                     CBC Auto Differential[[id number]]        Abnormal            Final result               Scan Slide[[id number]]                                                                    Please view results for these tests on the individual orders.    CBC Auto Differential [[id number]]  (Abnormal) Collected: 12/04/22 1411     Specimen: Blood Updated: 12/04/22 1522     WBC 8.34 10*3/mm3      RBC 3.93 10*6/mm3      Hemoglobin 12.3 g/dL      Hematocrit 36.8 %      MCV 93.6 fL      MCH 31.3 pg      MCHC 33.4 g/dL      RDW 12.2 %      RDW-SD 42.2 fl      MPV 10.9 fL      Platelets 141 10*3/mm3      Neutrophil % 70.8 %      Lymphocyte % 14.4 %      Monocyte % 11.5 %      Eosinophil % 2.5 %      Basophil % 0.4 %      Immature Grans % 0.4 %      Neutrophils, Absolute 5.91 10*3/mm3      Lymphocytes, Absolute 1.20 10*3/mm3      Monocytes, Absolute 0.96 10*3/mm3      Eosinophils, Absolute 0.21 10*3/mm3      Basophils, Absolute 0.03 10*3/mm3      Immature Grans, Absolute 0.03 10*3/mm3      nRBC 0.0 /100 WBC          Imaging Results (Last 24 Hours)     ** No results found for the last 24 hours. **           I reviewed the patient's new clinical results.  I reviewed the patient's new imaging results and agree with the interpretation.  I reviewed the patient's other test results and agree with the interpretation      Assessment & Plan       Closed fracture of proximal end of left femur, initial encounter ([facility])    Urinary retention      Assessment & Plan    POD 1 cystoscopy, findings: obstructing prostate with heavily trabeculated bladder. Dark yellow urine this morning. No abdominal pain. Afebrile.   Estimated Creatinine Clearance: 37.3 mL/min (by C-G formula based on SCr of 1.15 mg/dL).    Plan:  Keep Godwin upon discharge  Follow up Dr. Giang 1-2 weeks    DESMOND Ugarte  12/05/22  10:46 CST        Electronically signed by Rhea Velasquez APRN at 12/05/22 2986

## 2022-12-05 NOTE — THERAPY TREATMENT NOTE
Patient Name: Teofilo Brown  : 1935    MRN: [id number]                              Today's Date: 2022       Admit Date: 2022    Visit Dx:     ICD-10-CM ICD-9-CM   1. Closed fracture of proximal end of left femur, initial encounter ([facility])  S72.002A 820.8   2. Fall, initial encounter  W19.XXXA E888.9   3. Urinary retention  R33.9 788.20   4. Impaired mobility and ADLs  Z74.09 V49.89    Z78.9    5. Impaired functional mobility, balance, gait, and endurance  Z74.09 V49.89   6. Gross hematuria  R31.0 599.71     Patient Active Problem List   Diagnosis   • Dementia (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)   • Benign essential HTN   • HLD (hyperlipidemia)   • CAD (coronary artery disease)   • Closed fracture of proximal end of left femur, initial encounter ([facility])   • Urinary retention     Past Medical History:   Diagnosis Date   • Dementia ([facility])    • Hyperlipidemia    • Myocardial infarction ([facility])      Past Surgical History:   Procedure Laterality Date   • CAROTID STENT        General Information     Row Name 22 1354          OT Time and Intention    Document Type therapy note (daily note)  -     Mode of Treatment occupational therapy  -     Row Name 22 1357          General Information    Patient Profile Reviewed yes  -     Existing Precautions/Restrictions fall  -     Row Name 22 1354          Cognition    Orientation Status (Cognition) oriented to;person  -     Row Name 22 1356          Safety Issues, Functional Mobility    Impairments Affecting Function (Mobility) balance;endurance/activity tolerance;grasp;pain;strength;shortness of breath  -           User Key  (r) = Recorded By, (t) = Taken By, (c) = Cosigned By    Initials Name Provider Type    Ange Estes, OT Occupational Therapist                  Mobility/ADL's    No documentation.                Obj/Interventions     [facility] Name 12/05/22 1354          Shoulder (Therapeutic Exercise)    Shoulder (Therapeutic Exercise) AROM (active range of motion);strengthening exercise  -     Shoulder AROM (Therapeutic Exercise) bilateral;scapular elevation;supine;15 repititions  -     Shoulder Strengthening (Therapeutic Exercise) bilateral;extension;flexion;2 lb free weight;15 repititions;2 sets  -     Row Name 12/05/22 1354          Elbow/Forearm (Therapeutic Exercise)    Elbow/Forearm (Therapeutic Exercise) strengthening exercise  -     Elbow/Forearm Strengthening (Therapeutic Exercise) bilateral;flexion;extension;15 repititions;2 lb free weight;2 sets  -[facility] Name 12/05/22 135          Motor Skills    Therapeutic Exercise shoulder;elbow/forearm  -           User Key  (r) = Recorded By, (t) = Taken By, (c) = Cosigned By    Initials Name Provider Type    Ange Estes OT Occupational Therapist               Goals/Plan     [facility] Name 12/05/22 [facility]          Transfer Goal 1 (OT)    Activity/Assistive Device (Transfer Goal 1, OT) toilet  -     [place] Level/Cues Needed (Transfer Goal 1, OT) minimum assist (75% or more patient effort)  -     Time Frame (Transfer Goal 1, OT) long term goal (LTG);by discharge  -     Progress/Outcome (Transfer Goal 1, OT) goal not met  -[facility] Name 12/05/22 [facility]          Bathing Goal 1 (OT)    Activity/Device (Bathing Goal 1, OT) lower body bathing  -     [place] Level/Cues Needed (Bathing Goal 1, OT) minimum assist (75% or more patient effort)  -     Time Frame (Bathing Goal 1, OT) long term goal (LTG);by discharge  -     Progress/Outcomes (Bathing Goal 1, OT) goal not met  -[facility] Name 12/05/22 1356          Dressing Goal 1 (OT)    Activity/Device (Dressing Goal 1, OT) lower body dressing  -     [place]/Cues Needed (Dressing Goal 1, OT) moderate assist (50-74% patient effort)  -      Time Frame (Dressing Goal 1, OT) long term goal (LTG);by discharge  -     Progress/Outcome (Dressing Goal 1, OT) goal not met  -     Row Name 12/05/22 [facility]          Toileting Goal 1 (OT)    Activity/Device (Toileting Goal 1, OT) toileting skills, all  -     [place] Level/Cues Needed (Toileting Goal 1, OT) minimum assist (75% or more patient effort)  -     Time Frame (Toileting Goal 1, OT) long term goal (LTG);by discharge  -     Progress/Outcome (Toileting Goal 1, OT) goal not met  -           User Key  (r) = Recorded By, (t) = Taken By, (c) = Cosigned By    Initials Name Provider Type    Ange Estes, JANNA Occupational Therapist               Clinical Impression     Row Name 12/05/22 [facility]          Pain Assessment    Pretreatment Pain Rating 0/10 - no pain  -     Posttreatment Pain Rating 0/10 - no pain  -     Row Name 12/05/22 [facility]          Plan of Care Review    Plan of Care Reviewed With patient;spouse  -     Progress improving  -     Outcome Evaluation OT tx completed. Pt fowlers in bed upon entering and agreeable to the session. Pt completed shoulder and elbow exercises with 2lb free weight for 2 sets of 15 reps while sitting up in bed. Pt required max VCs to stay on task and was conversationally confused at times. No goals met, continue OT POC.  -     Row Name 12/05/22 9884          Therapy Assessment/Plan (OT)    Rehab Potential (OT) good, to achieve stated therapy goals  -     Criteria for Skilled Therapeutic Interventions Met (OT) yes;skilled treatment is necessary  -     Therapy Frequency (OT) daily  -     Row Name 12/05/22 [facility]5          Therapy Plan Review/Discharge Plan (OT)    Anticipated Discharge Disposition (OT) skilled nursing facility  -     Row Name 12/05/22 [facility]9          Positioning and Restraints    Pre-Treatment Position in bed  -     Post Treatment Position bed  -     In Bed fowlers;call light within reach;encouraged to call for assist;exit alarm  on;with family/caregiver  -           User Key  (r) = Recorded By, (t) = Taken By, (c) = Cosigned By    Initials Name Provider Type    Ange Estes OT Occupational Therapist               Outcome Measures     Row Name 12/05/22 1354          How much help from another is currently needed...    Putting on and taking off regular lower body clothing? 1  -MC     Bathing (including washing, rinsing, and drying) 2  -MC     Toileting (which includes using toilet bed pan or urinal) 1  -MC     Taking care of personal grooming (such as brushing teeth) 3  -MC     Eating meals 3  -     Row Name 12/05/22 1320          How much help from another person do you currently need...    Turning from your back to your side while in flat bed without using bedrails? 2  -CA     Moving from lying on back to sitting on the side of a flat bed without bedrails? 2  -CA     Moving to and from a bed to a chair (including a wheelchair)? 2  -CA     Standing up from a chair using your arms (e.g., wheelchair, bedside chair)? 2  -CA     Climbing 3-5 steps with a railing? 1  -CA     To walk in hospital room? 2  -CA     AM-PAC 6 Clicks Score (PT) 11  -CA     Highest level of mobility 4 --> Transferred to chair/commode  -CA     Row Name 12/05/22 1354 12/05/22 1320       Functional Assessment    Outcome Measure Options AM-PAC 6 Clicks Daily Activity (OT)  - AM-PAC 6 Clicks Basic Mobility (PT)  -CA          User Key  (r) = Recorded By, (t) = Taken By, (c) = Cosigned By    Initials Name Provider Type    Go Phelps PTA Physical Therapist Assistant    Ange Estes OT Occupational Therapist                Occupational Therapy Education     Title: PT OT SLP Therapies (In Progress)     Topic: Occupational Therapy (In Progress)     Point: ADL training (In Progress)     Description:   Instruct learner(s) on proper safety adaptation and remediation techniques during self care or transfers.   Instruct in proper use of assistive devices.               Learning Progress Summary           Patient Acceptance, E,TB, NR by  at 12/5/2022 1420    Comment: OT role, POC    Acceptance, E,TB, NR by  at 12/2/2022 1501    Comment: POC, role of OT                   Point: Home exercise program (Not Started)     Description:   Instruct learner(s) on appropriate technique for monitoring, assisting and/or progressing therapeutic exercises/activities.              Learner Progress:  Not documented in this visit.          Point: Precautions (Not Started)     Description:   Instruct learner(s) on prescribed precautions during self-care and functional transfers.              Learner Progress:  Not documented in this visit.          Point: Body mechanics (Not Started)     Description:   Instruct learner(s) on proper positioning and spine alignment during self-care, functional mobility activities and/or exercises.              Learner Progress:  Not documented in this visit.                      User Key     Initials Effective Dates Name Provider Type Discipline     06/14/21 -  Annemarie Funes OT Occupational Therapist OT     10/19/22 -  Ange Horowitz OT Occupational Therapist OT              OT Recommendation and Plan  Therapy Frequency (OT): daily  Plan of Care Review  Plan of Care Reviewed With: patient, spouse  Progress: improving  Outcome Evaluation: OT tx completed. Pt fowlers in bed upon entering and agreeable to the session. Pt completed shoulder and elbow exercises with 2lb free weight for 2 sets of 15 reps while sitting up in bed. Pt required max VCs to stay on task and was conversationally confused at times. No goals met, continue OT POC.     Time Calculation:    Time Calculation- OT     Row Name 12/05/22 1354             Time Calculation-     OT Start Time 1354  -      OT Stop Time 1409  -      OT Time Calculation (min) 15 min  -      Total Timed Code Minutes- OT 15 minute(s)  -      OT Received On 12/05/22  -         Timed Charges     44160 - OT Therapeutic Exercise Minutes 15  -MC         Total Minutes    Timed Charges Total Minutes 15  -MC       Total Minutes 15  -MC            User Key  (r) = Recorded By, (t) = Taken By, (c) = Cosigned By    Initials Name Provider Type    Ange Estes OT Occupational Therapist              Therapy Charges for Today     Code Description Service Date Service Provider Modifiers Qty    [id number]  OT THER PROC EA 15 MIN 12/5/2022 Ange Horowitz OT GO 1               Ange Horowitz OT  12/5/2022

## 2022-12-05 NOTE — PLAN OF CARE
Goal Outcome Evaluation:  Plan of Care Reviewed With: patient        Progress: improving  Outcome Evaluation: pt. was mod assist for sup to sit transfer and mod assist x 2 for sit to supine to help get legs up in bed.  Pt. t/f sit to stand with mod assist x 1 from bed and from BSC and use of RW.  Pt. amb. 6' and then 3' x 2 with RW and mod assist x 1 with antalgic gait and decreased step length and decreased araceli.  Pt. also performed LAQ and AP seated EOB with AROM.   No new goals met and pt. improving.  Pt. would need continued skilled PT and inpt. rehab vs SNF for continued PT.

## 2022-12-05 NOTE — PLAN OF CARE
Goal Outcome Evaluation:  Plan of Care Reviewed With: patient, spouse        Progress: improving  Outcome Evaluation: OT tx completed. Pt fowlers in bed upon entering and agreeable to the session. Pt completed shoulder and elbow exercises with 2lb free weight for 2 sets of 15 reps while sitting up in bed. Pt required max VCs to stay on task and was conversationally confused at times. No goals met, continue OT POC.

## 2022-12-05 NOTE — PROGRESS NOTES
"   LOS: 4 days   Patient Care Team:  Nicole Burgos APRN as PCP - General (Urgent Care)    Subjective     Subjective:  Symptoms:  Stable.  (No hematuria, TMAX 97.6).        History taken from: patient chart    Objective     Vital Signs  Temp:  [96.8 °F (36 °C)-98 °F (36.7 °C)] 97.6 °F (36.4 °C)  Heart Rate:  [80-96] 91  Resp:  [16-18] 18  BP: (118-155)/(51-85) 155/85    Objective:  General Appearance:  In no acute distress.    Vital signs: (most recent): Blood pressure 155/85, pulse 91, temperature 97.6 °F (36.4 °C), temperature source Temporal, resp. rate 18, height 165.1 cm (65\"), weight 58.3 kg (128 lb 9.6 oz), SpO2 93 %.  Vital signs are normal.  No fever.    Output: Producing urine (Godwin clear dark yellow urine).    Lungs:  Normal effort and normal respiratory rate.    Abdomen: Abdomen is soft and non-distended.  There is no abdominal tenderness.     Neurological: Patient is alert.    Skin:  Warm, dry and pale.              Results Review:    Lab Results (last 24 hours)     Procedure Component Value Units Date/Time    Basic Metabolic Panel [[id number]]  (Abnormal) Collected: 12/05/22 0604    Specimen: Blood Updated: 12/05/22 0700     Glucose 118 mg/dL      BUN 22 mg/dL      Creatinine 1.15 mg/dL      Sodium 142 mmol/L      Potassium 3.3 mmol/L      Chloride 107 mmol/L      CO2 26.0 mmol/L      Calcium 8.9 mg/dL      BUN/Creatinine Ratio 19.1     Anion Gap 9.0 mmol/L      eGFR 61.6 mL/min/1.73      Comment: National Kidney Foundation and American Society of Nephrology (ASN) Task Force recommended calculation based on the Chronic Kidney Disease Epidemiology Collaboration (CKD-EPI) equation refit without adjustment for race.       Narrative:      GFR Normal >60  Chronic Kidney Disease <60  Kidney Failure <15    The GFR formula is only valid for adults with stable renal function between ages 18 and 70.    CBC & Differential [[id number]]  (Abnormal) Collected: 12/05/22 0604    Specimen: Blood Updated: 12/05/22 0652 "    Narrative:      The following orders were created for panel order CBC & Differential.  Procedure                               Abnormality         Status                     ---------                               -----------         ------                     CBC Auto Differential[[id number]]        Abnormal            Final result               Scan Slide[[id number]]                                                                    Please view results for these tests on the individual orders.    CBC Auto Differential [[id number]]  (Abnormal) Collected: 12/05/22 0604    Specimen: Blood Updated: 12/05/22 0652     WBC 7.52 10*3/mm3      RBC 3.76 10*6/mm3      Hemoglobin 12.2 g/dL      Hematocrit 35.0 %      MCV 93.1 fL      MCH 32.4 pg      MCHC 34.9 g/dL      RDW 12.3 %      RDW-SD 42.2 fl      MPV 10.7 fL      Platelets 142 10*3/mm3      Neutrophil % 61.5 %      Lymphocyte % 22.1 %      Monocyte % 10.4 %      Eosinophil % 5.2 %      Basophil % 0.5 %      Immature Grans % 0.3 %      Neutrophils, Absolute 4.63 10*3/mm3      Lymphocytes, Absolute 1.66 10*3/mm3      Monocytes, Absolute 0.78 10*3/mm3      Eosinophils, Absolute 0.39 10*3/mm3      Basophils, Absolute 0.04 10*3/mm3      Immature Grans, Absolute 0.02 10*3/mm3      nRBC 0.0 /100 WBC     Comprehensive Metabolic Panel [[id number]]  (Abnormal) Collected: 12/04/22 1411    Specimen: Blood Updated: 12/04/22 1538     Glucose 121 mg/dL      BUN 26 mg/dL      Creatinine 1.28 mg/dL      Sodium 143 mmol/L      Potassium 3.4 mmol/L      Chloride 108 mmol/L      CO2 24.0 mmol/L      Calcium 8.7 mg/dL      Total Protein 5.7 g/dL      Albumin 3.20 g/dL      ALT (SGPT) <5 U/L      AST (SGOT) 31 U/L      Alkaline Phosphatase 80 U/L      Total Bilirubin 0.3 mg/dL      Globulin 2.5 gm/dL      A/G Ratio 1.3 g/dL      BUN/Creatinine Ratio 20.3     Anion Gap 11.0 mmol/L      eGFR 54.2 mL/min/1.73      Comment: National Kidney Foundation and American Society of Nephrology (ASN)  Task Force recommended calculation based on the Chronic Kidney Disease Epidemiology Collaboration (CKD-EPI) equation refit without adjustment for race.       Narrative:      GFR Normal >60  Chronic Kidney Disease <60  Kidney Failure <15    The GFR formula is only valid for adults with stable renal function between ages 18 and 70.    CBC & Differential [[id number]]  (Abnormal) Collected: 12/04/22 1411    Specimen: Blood Updated: 12/04/22 1522    Narrative:      The following orders were created for panel order CBC & Differential.  Procedure                               Abnormality         Status                     ---------                               -----------         ------                     CBC Auto Differential[[id number]]        Abnormal            Final result               Scan Slide[[id number]]                                                                    Please view results for these tests on the individual orders.    CBC Auto Differential [[id number]]  (Abnormal) Collected: 12/04/22 1411    Specimen: Blood Updated: 12/04/22 1522     WBC 8.34 10*3/mm3      RBC 3.93 10*6/mm3      Hemoglobin 12.3 g/dL      Hematocrit 36.8 %      MCV 93.6 fL      MCH 31.3 pg      MCHC 33.4 g/dL      RDW 12.2 %      RDW-SD 42.2 fl      MPV 10.9 fL      Platelets 141 10*3/mm3      Neutrophil % 70.8 %      Lymphocyte % 14.4 %      Monocyte % 11.5 %      Eosinophil % 2.5 %      Basophil % 0.4 %      Immature Grans % 0.4 %      Neutrophils, Absolute 5.91 10*3/mm3      Lymphocytes, Absolute 1.20 10*3/mm3      Monocytes, Absolute 0.96 10*3/mm3      Eosinophils, Absolute 0.21 10*3/mm3      Basophils, Absolute 0.03 10*3/mm3      Immature Grans, Absolute 0.03 10*3/mm3      nRBC 0.0 /100 WBC          Imaging Results (Last 24 Hours)     ** No results found for the last 24 hours. **           I reviewed the patient's new clinical results.  I reviewed the patient's new imaging results and agree with the interpretation.  I reviewed  the patient's other test results and agree with the interpretation      Assessment & Plan       Closed fracture of proximal end of left femur, initial encounter ([facility])    Urinary retention      Assessment & Plan    POD 1 cystoscopy, findings: obstructing prostate with heavily trabeculated bladder. Dark yellow urine this morning. No abdominal pain. Afebrile.   Estimated Creatinine Clearance: 37.3 mL/min (by C-G formula based on SCr of 1.15 mg/dL).    Plan:  Keep Godwin upon discharge  Follow up Dr. Giang 1-2 weeks    DESMOND Ugarte  12/05/22  10:46 CST

## 2022-12-06 PROCEDURE — 94761 N-INVAS EAR/PLS OXIMETRY MLT: CPT

## 2022-12-06 PROCEDURE — 97530 THERAPEUTIC ACTIVITIES: CPT

## 2022-12-06 PROCEDURE — 94799 UNLISTED PULMONARY SVC/PX: CPT

## 2022-12-06 PROCEDURE — 97110 THERAPEUTIC EXERCISES: CPT

## 2022-12-06 PROCEDURE — 25010000002 ENOXAPARIN PER 10 MG: Performed by: UROLOGY

## 2022-12-06 PROCEDURE — 97535 SELF CARE MNGMENT TRAINING: CPT

## 2022-12-06 RX ADMIN — MELATONIN 3 MG: 3 TAB ORAL at 22:22

## 2022-12-06 RX ADMIN — IPRATROPIUM BROMIDE AND ALBUTEROL SULFATE 3 ML: 2.5; .5 SOLUTION RESPIRATORY (INHALATION) at 15:08

## 2022-12-06 RX ADMIN — RIVASTIGMINE TRANSDERMAL SYSTEM 1 PATCH: 4.6 PATCH, EXTENDED RELEASE TRANSDERMAL at 08:55

## 2022-12-06 RX ADMIN — ASPIRIN 81 MG: 81 TABLET, CHEWABLE ORAL at 08:54

## 2022-12-06 RX ADMIN — ACETAMINOPHEN 650 MG: 325 TABLET, FILM COATED ORAL at 08:55

## 2022-12-06 RX ADMIN — Medication 1 TABLET: at 08:55

## 2022-12-06 RX ADMIN — ESCITALOPRAM 5 MG: 5 TABLET, FILM COATED ORAL at 08:54

## 2022-12-06 RX ADMIN — ATORVASTATIN CALCIUM 40 MG: 40 TABLET, FILM COATED ORAL at 22:00

## 2022-12-06 RX ADMIN — IPRATROPIUM BROMIDE AND ALBUTEROL SULFATE 3 ML: 2.5; .5 SOLUTION RESPIRATORY (INHALATION) at 20:00

## 2022-12-06 RX ADMIN — ENOXAPARIN SODIUM 40 MG: 100 INJECTION SUBCUTANEOUS at 08:55

## 2022-12-06 NOTE — THERAPY TREATMENT NOTE
Patient Name: Teofilo Brown  : 1935    MRN: [id number]                              Today's Date: 2022       Admit Date: 2022    Visit Dx:     ICD-10-CM ICD-9-CM   1. Closed fracture of proximal end of left femur, initial encounter (Formerly [facility])  S72.002A 820.8   2. Fall, initial encounter  W19.XXXA E888.9   3. Urinary retention  R33.9 788.20   4. Impaired mobility and ADLs  Z74.09 V49.89    Z78.9    5. Impaired functional mobility, balance, gait, and endurance  Z74.09 V49.89   6. 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, due to cerebrovascular dz)   • Cerebrovascular disease   • COPD (chronic obstructive pulmonary disease) ([facility]ly [facility])   • Benign essential HTN   • HLD (hyperlipidemia)   • CAD (coronary artery disease)   • Closed fracture of proximal end of left femur, initial encounter ([facility])   • Urinary retention     Past Medical History:   Diagnosis Date   • Dementia (HCC)    • Hyperlipidemia    • Myocardial infarction ([facility]ly [facility])      Past Surgical History:   Procedure Laterality Date   • CAROTID STENT        General Information     Row Name 22 1056          OT Time and Intention    Document Type therapy note (daily note)  -CM     Mode of Treatment occupational therapy  -CM     Row Name 22 1056          General Information    Patient Profile Reviewed yes  -CM     Existing Precautions/Restrictions fall  -CM     Row Name 22 1056          Cognition    Orientation Status (Cognition) oriented to;person  -CM     Row Name 22 1056          Safety Issues, Functional Mobility    Safety Issues Affecting Function (Mobility) ability to follow commands;awareness of need for assistance;safety precaution awareness;problem-solving;insight into deficits/self-awareness;sequencing abilities  -CM     Impairments Affecting Function (Mobility) balance;endurance/activity  tolerance;grasp;pain;strength;shortness of breath  -CM           User Key  (r) = Recorded By, (t) = Taken By, (c) = Cosigned By    Initials Name Provider Type    Tabatha Calderon OT Occupational Therapist                 Mobility/ADL's     Row Name 12/06/22 1056          Activities of Daily Living    BADL Assessment/Intervention feeding;grooming  -CM     Row Name 12/06/22 1056          Mobility    Extremity Weight-bearing Status left lower extremity  -CM     Left Lower Extremity (Weight-bearing Status) weight-bearing as tolerated (WBAT)  -CM     Row Name 12/06/22 1056          Self-Feeding Assessment/Training    [place] Level (Feeding) feeding skills;liquids to mouth;prepare tray/open items;nonverbal cues (demo/gesture)  -CM     Position (Self-Feeding) sitting up in bed  -CM     Row Name 12/06/22 1056          Grooming Assessment/Training    [place] Level (Grooming) hair care, combing/brushing;wash face, hands;verbal cues  -CM     Position (Grooming) sitting up in bed  -CM           User Key  (r) = Recorded By, (t) = Taken By, (c) = Cosigned By    Initials Name Provider Type    Tabatha Calderon OT Occupational Therapist               Obj/Interventions    No documentation.                Goals/Plan    No documentation.                Clinical Impression     Row Name 12/06/22 1056          Pain Assessment    Pretreatment Pain Rating 0/10 - no pain  -CM     Posttreatment Pain Rating 0/10 - no pain  -CM     Row Name 12/06/22 1056          Plan of Care Review    Progress no change  -CM     Outcome Evaluation Pt oriented to person only. Requires Max cues for attention. Talked about unrelated topics throughout session. Reported he did not want to move becuase he hurts when he moves. Completed simple grooming sitting up in bed with verbal cues required. Required set-up for feeding and verbal cues for bringing drink and food to mouth. Pt left sitting up in bed with bed alarm on. VSS. Would continue to benefit  from OT for increased (I) and engagement in ADLs.  -CM     Row Name 12/06/22 1056          Therapy Assessment/Plan (OT)    Rehab Potential (OT) good, to achieve stated therapy goals  -CM     Criteria for Skilled Therapeutic Interventions Met (OT) yes;skilled treatment is necessary  -     Therapy Frequency (OT) daily  -CM     Row Name 12/06/22 1056          Therapy Plan Review/Discharge Plan (OT)    Anticipated Discharge Disposition (OT) skilled nursing facility  -     Row Name 12/06/22 1056          Vital Signs    Pre Systolic BP Rehab 132  -CM     Pre Treatment Diastolic BP 65  -CM     Pretreatment Heart Rate (beats/min) 61  -CM     Pre SpO2 (%) 93  -CM     O2 Delivery Pre Treatment room air  -CM     Pre Patient Position Supine  -CM     Row Name 12/06/22 1056          Positioning and Restraints    Pre-Treatment Position in bed  -CM     Post Treatment Position bed  -CM     In Bed call light within reach;encouraged to call for assist;exit alarm on;fowlers  -CM           User Key  (r) = Recorded By, (t) = Taken By, (c) = Cosigned By    Initials Name Provider Type    Tabatha Calderon OT Occupational Therapist               Outcome Measures     Row Name 12/06/22 1056          How much help from another is currently needed...    Putting on and taking off regular lower body clothing? 1  -CM     Bathing (including washing, rinsing, and drying) 2  -CM     Toileting (which includes using toilet bed pan or urinal) 1  -CM     Putting on and taking off regular upper body clothing 2  -CM     Taking care of personal grooming (such as brushing teeth) 3  -CM     Eating meals 3  -CM     AM-PAC 6 Clicks Score (OT) 12  -CM     Row Name 12/06/22 1056          Functional Assessment    Outcome Measure Options AM-PAC 6 Clicks Daily Activity (OT)  -CM           User Key  (r) = Recorded By, (t) = Taken By, (c) = Cosigned By    Initials Name Provider Type    Tabatha Calderon OT Occupational Therapist                Occupational Therapy  Education     Title: PT OT SLP Therapies (In Progress)     Topic: Occupational Therapy (In Progress)     Point: ADL training (In Progress)     Description:   Instruct learner(s) on proper safety adaptation and remediation techniques during self care or transfers.   Instruct in proper use of assistive devices.              Learning Progress Summary           Patient Acceptance, E,TB, NR by  at 12/6/2022 1118    Comment: OT POC, ADL engagement    Acceptance, E,TB, NR by  at 12/5/2022 1420    Comment: OT role, POC    Acceptance, E,TB, NR by  at 12/2/2022 1501    Comment: POC, role of OT                   Point: Home exercise program (Not Started)     Description:   Instruct learner(s) on appropriate technique for monitoring, assisting and/or progressing therapeutic exercises/activities.              Learner Progress:  Not documented in this visit.          Point: Precautions (Not Started)     Description:   Instruct learner(s) on prescribed precautions during self-care and functional transfers.              Learner Progress:  Not documented in this visit.          Point: Body mechanics (Not Started)     Description:   Instruct learner(s) on proper positioning and spine alignment during self-care, functional mobility activities and/or exercises.              Learner Progress:  Not documented in this visit.                      User Key     Initials Effective Dates Name Provider Type Discipline     06/14/21 -  Annemarie Funes, OT Occupational Therapist OT     10/19/22 -  Ange Horowitz OT Occupational Therapist OT    BLAKE 11/18/22 -  Tabatha Armendariz OT Occupational Therapist OT              OT Recommendation and Plan  Therapy Frequency (OT): daily  Plan of Care Review  Progress: no change  Outcome Evaluation: Pt oriented to person only. Requires Max cues for attention. Talked about unrelated topics throughout session. Reported he did not want to move becuase he hurts when he moves. Completed simple grooming  sitting up in bed with verbal cues required. Required set-up for feeding and verbal cues for bringing drink and food to mouth. Pt left sitting up in bed with bed alarm on. VSS. Would continue to benefit from OT for increased (I) and engagement in ADLs.     Time Calculation:    Time Calculation- OT     Row Name 12/06/22 1119             Time Calculation- OT    OT Start Time 1056  -CM      OT Stop Time 1111  -CM      OT Time Calculation (min) 15 min  -CM      Total Timed Code Minutes- OT 15 minute(s)  -CM      OT Received On 12/06/22  -CM         Timed Charges    75938 - OT Self Care/Mgmt Minutes 15  -CM         Total Minutes    Timed Charges Total Minutes 15  -CM       Total Minutes 15  -CM            User Key  (r) = Recorded By, (t) = Taken By, (c) = Cosigned By    Initials Name Provider Type    Tabatha Calderon OT Occupational Therapist              Therapy Charges for Today     Code Description Service Date Service Provider Modifiers Qty    28953575153 HC OT SELF CARE/MGMT/TRAIN EA 15 MIN 12/6/2022 Tabatha Armendariz OT GO 1               Tabatha Armendariz OT  12/6/2022

## 2022-12-06 NOTE — THERAPY TREATMENT NOTE
Acute Care - Physical Therapy Treatment Note  [facility]     Patient Name: Teofilo Brown  : 1935  MRN: [id number]  Today's Date: 2022      Visit Dx:     ICD-10-CM ICD-9-CM   1. Closed fracture of proximal end of left femur, initial encounter ([facility])  S72.002A 820.8   2. Fall, initial encounter  W19.XXXA E888.9   3. Urinary retention  R33.9 788.20   4. Impaired mobility and ADLs  Z74.09 V49.89    Z78.9    5. Impaired functional mobility, balance, gait, and endurance  Z74.09 V49.89   6. 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, due to cerebrovascular dz)   • Cerebrovascular disease   • COPD (chronic obstructive pulmonary disease) ([facility])   • Benign essential HTN   • HLD (hyperlipidemia)   • CAD (coronary artery disease)   • Closed fracture of proximal end of left femur, initial encounter ([facility])   • Urinary retention     Past Medical History:   Diagnosis Date   • Dementia ([facility])    • Hyperlipidemia    • Myocardial infarction ([facility])      Past Surgical History:   Procedure Laterality Date   • CAROTID STENT       PT Assessment (last 12 hours)     PT Evaluation and Treatment     Row Name 22 1549          Physical Therapy Time and Intention    Subjective Information no complaints  -TW     Document Type therapy note (daily note)  -TW     Mode of Treatment physical therapy;individual therapy  -TW     Patient Effort fair  -TW     Row Name 22 1541          General Information    Patient Profile Reviewed yes  -TW     Patient Observations agree to therapy;poorly cooperative  -TW     Patient/Family/Caregiver Comments/Observations none  -TW     General Observations of Patient Pt sup in bed.  -TW     Existing Precautions/Restrictions fall  -TW     Row Name 22 1548          Cognition    Affect/Mental Status (Cognition) confused  -TW     Orientation Status  (Cognition) oriented to;person  -TW     Follows Commands (Cognition) follows one-step commands;25-49% accuracy  -TW     Personal Safety Interventions muscle strengthening facilitated  -TW     Row Name 12/06/22 1549          Mobility    Extremity Weight-bearing Status left lower extremity  -TW     Left Lower Extremity (Weight-bearing Status) weight-bearing as tolerated (WBAT)  -TW     Row Name 12/06/22 1549          Bed Mobility    Bed Mobility supine-sit;sit-supine  -TW     Scooting/Bridging [place] (Bed Mobility) minimum assist (75% patient effort);moderate assist (50% patient effort)  -TW     Supine-Sit [place] (Bed Mobility) moderate assist (50% patient effort)  -TW     Sit-Supine [place] (Bed Mobility) moderate assist (50% patient effort);2 person assist  -TW     Assistive Device (Bed Mobility) bed rails  -TW     Comment, (Bed Mobility) Pt sat EOB and performed  BLE ther ex.  -TW     Row Name 12/06/22 1549          Transfers    Transfers sit-stand transfer;stand-sit transfer;toilet transfer  -     Row Name 12/06/22 1549          Sit-Stand Transfer    Sit-Stand [place] (Transfers) not tested  -     Row Name 12/06/22 1549          Gait/Stairs (Locomotion)    [place] Level (Gait) not tested  -     Row Name 12/06/22 1549          Motor Skills    Therapeutic Exercise hip  -     Row Name 12/06/22 1549          Hip (Therapeutic Exercise)    Hip (Therapeutic Exercise) AAROM (active assistive range of motion)  -TW     Hip AAROM (Therapeutic Exercise) bilateral;sitting  -     Row Name 12/06/22 1549          Knee (Therapeutic Exercise)    Knee (Therapeutic Exercise) AAROM (active assistive range of motion)  -TW     Knee AAROM (Therapeutic Exercise) bilateral;sitting  -     Row Name 12/06/22 1549          Ankle (Therapeutic Exercise)    Ankle AROM (Therapeutic Exercise) bilateral;sitting  -TW     Row Name             Wound 12/02/22 Left hip Incision    Wound - Properties Group Placement  Date: 12/02/22  -JM Present on Hospital Admission: N  -JM Side: Left  -JM Location: hip  -JM Primary Wound Type: Incision  -JM    Retired Wound - Properties Group Placement Date: 12/02/22  -JM Present on Hospital Admission: N  -JM Side: Left  -JM Location: hip  -JM Primary Wound Type: Incision  -JM    Retired Wound - Properties Group Date first assessed: 12/02/22  -JM Present on Hospital Admission: N  -JM Side: Left  -JM Location: hip  -JM Primary Wound Type: Incision  -JM    Row Name 12/06/22 1549          Plan of Care Review    Plan of Care Reviewed With patient  -TW     Progress no change  -TW     Outcome Evaluation Pt only oriented to person. Pt able to follow commands 40-50% of time. Pt was figity and requires lots of cues to participate today. Pt t/f sup to sit with mod/max of 1. Pt sat EOB and performed B LE AAROM as he was able. Pt returned to sup with mod assist and left with all needs met. Pt would cont to benefit from therpay upon DC.  -TW     Row Name 12/06/22 1549          Vital Signs    Pre Patient Position Supine  -TW     Intra Patient Position Sitting  -TW     Post Patient Position Supine  -TW     Row Name 12/06/22 1549          Positioning and Restraints    Pre-Treatment Position in bed  -TW     Post Treatment Position bed  -TW     In Bed supine;call light within reach;encouraged to call for assist;exit alarm on  -TW     Row Name 12/06/22 1549          Therapy Assessment/Plan (PT)    Rehab Potential (PT) fair, will monitor progress closely  -TW     Row Name 12/06/22 1549          Bed Mobility Goal 1 (PT)    Activity/Assistive Device (Bed Mobility Goal 1, PT) sit to supine;supine to sit  -TW     [place] Level/Cues Needed (Bed Mobility Goal 1, PT) standby assist  -TW     Time Frame (Bed Mobility Goal 1, PT) by discharge  -TW     Progress/Outcomes (Bed Mobility Goal 1, PT) goal not met  -TW     Row Name 12/06/22 1549          Transfer Goal 1 (PT)    Activity/Assistive Device (Transfer Goal 1, PT)  sit-to-stand/stand-to-sit;bed-to-chair/chair-to-bed  -TW     [place] Level/Cues Needed (Transfer Goal 1, PT) standby assist  -TW     Time Frame (Transfer Goal 1, PT) by discharge  -TW     Progress/Outcome (Transfer Goal 1, PT) goal not met  -TW     Row Name 12/06/22 1549          Gait Training Goal 1 (PT)    Activity/Assistive Device (Gait Training Goal 1, PT) gait (walking locomotion);assistive device use  -TW     [place] Level (Gait Training Goal 1, PT) standby assist  -TW     Distance (Gait Training Goal 1, PT) 50'x1  -TW     Time Frame (Gait Training Goal 1, PT) by discharge  -TW     Progress/Outcome (Gait Training Goal 1, PT) goal not met  -TW           User Key  (r) = Recorded By, (t) = Taken By, (c) = Cosigned By    Initials Name Provider Type     Dayo Pino PTA Physical Therapist Assistant    Eugenia Harrington, RN Registered Nurse                Physical Therapy Education     Title: PT OT SLP Therapies (In Progress)     Topic: Physical Therapy (In Progress)     Point: Mobility training (In Progress)     Learning Progress Summary           Patient Acceptance, E, NR by  at 12/4/2022 1222    Acceptance, E,TB, VU by LR at 12/2/2022 1458    Comment: Educated on PT POC and goals.                   Point: Home exercise program (Done)     Learning Progress Summary           Patient Acceptance, E,TB, VU by LR at 12/2/2022 1458    Comment: Educated on PT POC and goals.                   Point: Body mechanics (Done)     Learning Progress Summary           Patient Acceptance, E,TB, VU by LR at 12/2/2022 1458    Comment: Educated on PT POC and goals.                   Point: Precautions (Done)     Learning Progress Summary           Patient Acceptance, E,TB, VU by LR at 12/2/2022 1458    Comment: Educated on PT POC and goals.                               User Key     Initials Effective Dates Name Provider Type [facility] 06/16/21 -  Percy Camacho PTA Physical Therapist Assistant PT    LR  06/16/21 -  Milan Cole Physical Therapist PT              PT Recommendation and Plan  Anticipated Discharge Disposition (PT): skilled nursing facility  Plan of Care Reviewed With: patient  Progress: no change  Outcome Evaluation: Pt only oriented to person. Pt able to follow commands 40-50% of time. Pt was figity and requires lots of cues to participate today. Pt t/f sup to sit with mod/max of 1. Pt sat EOB and performed B LE AAROM as he was able. Pt returned to sup with mod assist and left with all needs met. Pt would cont to benefit from therpay upon DC.   Outcome Measures     Row Name 12/05/22 1320 12/04/22 1057          How much help from another person do you currently need...    Turning from your back to your side while in flat bed without using bedrails? 2  -CA 2  -LORENA     Moving from lying on back to sitting on the side of a flat bed without bedrails? 2  -CA 2  -LORENA     Moving to and from a bed to a chair (including a wheelchair)? 2  -CA 2  -LORENA     Standing up from a chair using your arms (e.g., wheelchair, bedside chair)? 2  -CA 2  -LORENA     Climbing 3-5 steps with a railing? 1  -CA 1  -LORENA     To walk in hospital room? 2  -CA 2  -LORENA     AM-PAC 6 Clicks Score (PT) 11  -CA 11  -LORENA        Functional Assessment    Outcome Measure Options AM-PAC 6 Clicks Basic Mobility (PT)  -CA AM-PAC 6 Clicks Basic Mobility (PT)  -LORENA           User Key  (r) = Recorded By, (t) = Taken By, (c) = Cosigned By    Initials Name Provider Type    CA Go Rivera, PTA Physical Therapist Assistant    Percy Lee, KOTA Physical Therapist Assistant                 Time Calculation:    PT Charges     Row Name 12/06/22 1721             Time Calculation    Start Time 1549  -TW      Stop Time 1614  -TW      Time Calculation (min) 25 min  -TW         Time Calculation- PT    Total Timed Code Minutes- PT 25 minute(s)  -TW            User Key  (r) = Recorded By, (t) = Taken By, (c) = Cosigned By    Initials Name Provider Type    TW  Dayo Pino PTA Physical Therapist Assistant              Therapy Charges for Today     Code Description Service Date Service Provider Modifiers Qty    60674222283 HC PT THER PROC EA 15 MIN 12/6/2022 Dayo Pino PTA GP 1    22496817437 HC PT THERAPEUTIC ACT EA 15 MIN 12/6/2022 Dayo Pino PTA GP 1          PT G-Codes  Outcome Measure Options: AM-PAC 6 Clicks Daily Activity (OT)  AM-PAC 6 Clicks Score (PT): 11  AM-PAC 6 Clicks Score (OT): 12    Dayo Pino PTA  12/6/2022

## 2022-12-06 NOTE — PLAN OF CARE
Goal Outcome Evaluation:  Plan of Care Reviewed With: patient        Progress: no change  Outcome Evaluation: Pt only oriented to person. Pt able to follow commands 40-50% of time. Pt was figity and requires lots of cues to participate today. Pt t/f sup to sit with mod/max of 1. Pt sat EOB and performed B LE AAROM as he was able. Pt returned to sup with mod assist and left with all needs met. Pt would cont to benefit from therpay upon DC.

## 2022-12-06 NOTE — PLAN OF CARE
Goal Outcome Evaluation:           Progress: no change  Outcome Evaluation: Pt oriented to person only. Requires Max cues for attention. Talked about unrelated topics throughout session. Reported he did not want to move becuase he hurts when he moves. Completed simple grooming sitting up in bed with verbal cues required. Required set-up for feeding and verbal cues for bringing drink and food to mouth. Pt left sitting up in bed with bed alarm on. VSS. Would continue to benefit from OT for increased (I) and engagement in ADLs.

## 2022-12-06 NOTE — PLAN OF CARE
Goal Outcome Evaluation:   VSS. Neuro checks WDL incision intact. Pt medicated for pain this am. No IV Md aware, d/c possible tomorrow, to have covid swab at midnight tonight. Pt is more confused this shift compared to yesterday but calm and cooperative.

## 2022-12-06 NOTE — PROGRESS NOTES
[facility] Medicine Services  INPATIENT PROGRESS NOTE    Length of Stay: 5  Date of Admission: 12/1/2022  Primary Care Physician: Nicole Burgos APRN    Subjective   Chief Complaint: Confusion  HPI:  Pleasantly demented today, no new concerns reported other than he pulled out his IV earlier this morning.       Review of Systems   Unable to perform ROS: Dementia      All pertinent negatives and positives are as above. All other systems have been reviewed and are negative unless otherwise stated.     Objective    As of today 12/06/22  Temp:  [97.7 °F (36.5 °C)-98.8 °F (37.1 °C)] 98 °F (36.7 °C)  Heart Rate:  [70-99] 84  Resp:  [18-20] 18  BP: (118-185)/(64-94) 118/82    Physical Exam  Constitutional:       General: He is not in acute distress.     Appearance: He is not toxic-appearing.   HENT:      Head: Normocephalic and atraumatic.      Right Ear: External ear normal.      Left Ear: External ear normal.      Nose: Nose normal.      Mouth/Throat:      Mouth: Mucous membranes are moist.   Eyes:      Conjunctiva/sclera: Conjunctivae normal.   Cardiovascular:      Rate and Rhythm: Normal rate and regular rhythm.      Pulses: Normal pulses.      Heart sounds: Normal heart sounds.   Pulmonary:      Effort: Pulmonary effort is normal. No respiratory distress.      Breath sounds: Normal breath sounds.   Abdominal:      General: Bowel sounds are normal.      Palpations: Abdomen is soft.      Tenderness: There is no abdominal tenderness.   Musculoskeletal:         General: No deformity.   Skin:     General: Skin is warm and dry.      Capillary Refill: Capillary refill takes less than 2 seconds.   Neurological:      General: No focal deficit present.           Results Review:  I have reviewed the labs, radiology results, and diagnostic studies.    Laboratory Data:   Results from last 7 days   Lab Units 12/05/22  2233 12/05/22  1136 12/05/22  0604 12/04/22  1411 12/03/22  0701  12/02/22  0708   SODIUM mmol/L  --   --  142 143 146* 143   POTASSIUM mmol/L 4.0 3.3* 3.3* 3.4* 3.6 3.3*   CHLORIDE mmol/L  --   --  107 108* 109* 107   CO2 mmol/L  --   --  26.0 24.0 24.0 22.0   BUN mg/dL  --   --  22 26* 24* 20   CREATININE mg/dL  --   --  1.15 1.28* 1.34* 1.15   GLUCOSE mg/dL  --   --  118* 121* 119* 156*   CALCIUM mg/dL  --   --  8.9 8.7 9.3 9.6   BILIRUBIN mg/dL  --   --   --  0.3 0.5 0.9   ALK PHOS U/L  --   --   --  80 80 93   ALT (SGPT) U/L  --   --   --  <5 9 14   AST (SGOT) U/L  --   --   --  31 29 21   ANION GAP mmol/L  --   --  9.0 11.0 13.0 14.0     Estimated Creatinine Clearance: 37.2 mL/min (by C-G formula based on SCr of 1.15 mg/dL).  Results from last 7 days   Lab Units 12/02/22  0708   MAGNESIUM mg/dL 1.5*         Results from last 7 days   Lab Units 12/05/22  0604 12/04/22  1411 12/03/22  0701 12/02/22  1318 12/02/22  0708 12/01/22  2145   WBC 10*3/mm3 7.52 8.34 9.78  --  11.06* 11.40*   HEMOGLOBIN g/dL 12.2* 12.3* 13.0 14.4 14.5 14.6   HEMATOCRIT % 35.0* 36.8* 38.2 42.6 42.3 42.0   PLATELETS 10*3/mm3 142 141 110*  --  145 150     Results from last 7 days   Lab Units 12/01/22  2338   INR  1.21*       Culture Data:   No results found for: BLOODCX  No results found for: URINECX  No results found for: RESPCX  No results found for: WOUNDCX  No results found for: STOOLCX  No components found for: BODYFLD    Radiology Data:   Imaging Results (Last 24 Hours)     ** No results found for the last 24 hours. **          I have reviewed the patient's current medications.     Assessment/Plan     Principal Problem:    Closed fracture of proximal end of left femur, initial encounter ([facility])  Active Problems:    Urinary retention  Dementia    Plan:  - Appreciate orthopedic surgery assistance  - Status post intramedullary rodding of the left hip on 12/2/2022  - Okay to d/c to [facility] tomorrow. COVID swab ordered.    - Appreciate urology assistance, Godwin catheter to stay in at discharge  -  Continue home medications as appropriate  - Continue working with PT and OT  - No longer receiving IV medications.  As attempting to have another IV would likely only result in further agitation we will plan on leaving it out for now.   - DVT prophylaxis with Lovenox  - CODE STATUS: Full      I confirmed that the patient's Advance Care Plan is present, code status is documented, or surrogate decision maker is listed in the patient's medical record.      Discharge Planning: In process.    Syd Abreu MD

## 2022-12-06 NOTE — PROGRESS NOTES
"   LOS: 5 days   Patient Care Team:  Nicole Burgos APRN as PCP - General (Urgent Care)    Subjective     Subjective:  Symptoms:  Stable.  (No Godwin problems).    Pain:  He reports no pain.        History taken from: patient chart    Objective     Vital Signs  Temp:  [97.7 °F (36.5 °C)-98.8 °F (37.1 °C)] 98 °F (36.7 °C)  Heart Rate:  [70-99] 84  Resp:  [18-20] 18  BP: (118-185)/(64-94) 118/82    Objective:  General Appearance:  In no acute distress.    Vital signs: (most recent): Blood pressure 118/82, pulse 84, temperature 98 °F (36.7 °C), temperature source Oral, resp. rate 18, height 165.1 cm (65\"), weight 58.1 kg (128 lb), SpO2 92 %.  Vital signs are normal.    Output: Producing urine.    Lungs:  Normal effort and normal respiratory rate.    Abdomen: Abdomen is soft and non-distended.  There is no abdominal tenderness.     Neurological: Patient is alert.    Skin:  Warm, dry and pale.              Results Review:    Lab Results (last 24 hours)     Procedure Component Value Units Date/Time    Potassium [[id number]]  (Normal) Collected: 12/05/22 2233    Specimen: Blood Updated: 12/05/22 2256     Potassium 4.0 mmol/L          Imaging Results (Last 24 Hours)     ** No results found for the last 24 hours. **           I reviewed the patient's new clinical results.  I reviewed the patient's new imaging results and agree with the interpretation.  I reviewed the patient's other test results and agree with the interpretation      Assessment & Plan       Closed fracture of proximal end of left femur, initial encounter (HCC)    Urinary retention      Assessment & Plan    POD 2 cystoscopy which showed obstructing prostate and heavily trabeculated bladder, sahara urine today, afebrile. Estimated Creatinine Clearance: 37.2 mL/min (by C-G formula based on SCr of 1.15 mg/dL).    Plan:  Keep Godwin upon discharge, follow up Dr. Giang 1-2 weeks, monitor for UTI symptoms    DESMOND Ugarte  12/06/22  13:57 CST      "

## 2022-12-06 NOTE — PLAN OF CARE
Goal Outcome Evaluation:  Plan of Care Reviewed With: patient        Progress: no change  Outcome Evaluation: VSS. Patient talks about unrelated topics at all times when engaged in conversation. Remains confused to place, time, and situation. Continues to pull F/C apart, pull at it and has a small amount of bleeding to urethral area from tugging. Linens changed accordingly. Pulls hospital gown off frequently. Has had eyes open engaged in conversation with no one every time room entered.

## 2022-12-07 LAB
FLUAV RNA RESP QL NAA+PROBE: NOT DETECTED
FLUBV RNA RESP QL NAA+PROBE: NOT DETECTED
SARS-COV-2 RNA RESP QL NAA+PROBE: NOT DETECTED

## 2022-12-07 PROCEDURE — 25010000002 ENOXAPARIN PER 10 MG: Performed by: UROLOGY

## 2022-12-07 PROCEDURE — 97530 THERAPEUTIC ACTIVITIES: CPT

## 2022-12-07 PROCEDURE — 99024 POSTOP FOLLOW-UP VISIT: CPT | Performed by: ORTHOPAEDIC SURGERY

## 2022-12-07 PROCEDURE — 94760 N-INVAS EAR/PLS OXIMETRY 1: CPT

## 2022-12-07 PROCEDURE — 87636 SARSCOV2 & INF A&B AMP PRB: CPT | Performed by: FAMILY MEDICINE

## 2022-12-07 PROCEDURE — 94799 UNLISTED PULMONARY SVC/PX: CPT

## 2022-12-07 RX ADMIN — IPRATROPIUM BROMIDE AND ALBUTEROL SULFATE 3 ML: 2.5; .5 SOLUTION RESPIRATORY (INHALATION) at 15:10

## 2022-12-07 RX ADMIN — Medication 1 TABLET: at 08:50

## 2022-12-07 RX ADMIN — RIVASTIGMINE TRANSDERMAL SYSTEM 1 PATCH: 4.6 PATCH, EXTENDED RELEASE TRANSDERMAL at 08:51

## 2022-12-07 RX ADMIN — ATORVASTATIN CALCIUM 40 MG: 40 TABLET, FILM COATED ORAL at 21:23

## 2022-12-07 RX ADMIN — ENOXAPARIN SODIUM 40 MG: 100 INJECTION SUBCUTANEOUS at 08:50

## 2022-12-07 RX ADMIN — ACETAMINOPHEN 650 MG: 325 TABLET, FILM COATED ORAL at 08:50

## 2022-12-07 RX ADMIN — IPRATROPIUM BROMIDE AND ALBUTEROL SULFATE 3 ML: 2.5; .5 SOLUTION RESPIRATORY (INHALATION) at 21:06

## 2022-12-07 RX ADMIN — ESCITALOPRAM 5 MG: 5 TABLET, FILM COATED ORAL at 08:50

## 2022-12-07 RX ADMIN — IPRATROPIUM BROMIDE AND ALBUTEROL SULFATE 3 ML: 2.5; .5 SOLUTION RESPIRATORY (INHALATION) at 09:50

## 2022-12-07 RX ADMIN — ASPIRIN 81 MG: 81 TABLET, CHEWABLE ORAL at 08:50

## 2022-12-07 NOTE — PLAN OF CARE
Goal Outcome Evaluation:  Plan of Care Reviewed With: patient, spouse        Progress: no change  Outcome Evaluation: OT tx completed. Pt supine and sleeping upon entering. Pt difficult to arouse this AM, pt followed 10% of verbal commands. Pt max A for [facility] gown while supine in bed. OT and spouse attempted to motivate pt to eat breakfast, pt reported that he just wanted to nap. No goals met, continue OT POC.

## 2022-12-07 NOTE — PLAN OF CARE
Goal Outcome Evaluation:  Plan of Care Reviewed With: patient           Outcome Evaluation: OT treatment completed. Patient only oriented to self. Pt only able to follow commands about 50% of the time with verbal and tactile cues. Pt completed sup<>sit with mod A. Patient able to tolerate sitting EOB with SBA/CGA at times for ~10 minutes to complete grooming (wash face) and BUE exercises (straight arm raises and bicep curl) x 1 set x 10 reps using 2 Lb free weight. No goals met this date. Cont OT POC.

## 2022-12-07 NOTE — PROGRESS NOTES
"   LOS: 6 days   Patient Care Team:  Nicole Burgos APRN as PCP - General (Urgent Care)    Subjective     Subjective:  Symptoms:  Stable.    Pain:  Pain is well controlled.        History taken from: patient chart    Objective     Vital Signs  Temp:  [96 °F (35.6 °C)-97.6 °F (36.4 °C)] 96 °F (35.6 °C)  Heart Rate:  [] 78  Resp:  [18-22] 18  BP: (126-158)/(67-80) 126/67    Objective:  General Appearance:  In no acute distress and comfortable.    Vital signs: (most recent): Blood pressure 126/67, pulse 78, temperature 96 °F (35.6 °C), temperature source Oral, resp. rate 18, height 165.1 cm (65\"), weight 55.7 kg (122 lb 12.8 oz), SpO2 93 %.  Vital signs are normal.  No fever.    Output: Producing urine (Godwin clear yellow urine.).    Lungs:  Normal effort and normal respiratory rate.    Abdomen: Abdomen is soft and non-distended.    Skin:  Warm, dry and pale.              Results Review:    Lab Results (last 24 hours)     Procedure Component Value Units Date/Time    COVID-19 and FLU A/B PCR - Swab, Nasopharynx [[id number]]  (Normal) Collected: 12/07/22 0137    Specimen: Swab from Nasopharynx Updated: 12/07/22 0219     COVID19 Not Detected     Influenza A PCR Not Detected     Influenza B PCR Not Detected    Narrative:      Fact sheet for providers: https://www.fda.gov/media/845054/download    Fact sheet for patients: https://www.fda.gov/media/659477/download    Test performed by PCR.         Imaging Results (Last 24 Hours)     ** No results found for the last 24 hours. **           I reviewed the patient's new clinical results.  I reviewed the patient's new imaging results and agree with the interpretation.  I reviewed the patient's other test results and agree with the interpretation      Assessment & Plan       Closed fracture of proximal end of left femur, initial encounter (HCC)    Urinary retention      Assessment & Plan    POD 3 cystoscopy which showed obstructing prostate and heavily trabeculated bladder, " dark yellow urine. No Godwin problems. Estimated Creatinine Clearance: 35.7 mL/min (by C-G formula based on SCr of 1.15 mg/dL).    Plan:  Keep Godwin upon discharge, follow up Dr. Giang 1-2 weeks, monitor for UTI symptoms  Urology will sign off, call if needed    DESMOND Ugarte  12/07/22  12:45 CST

## 2022-12-07 NOTE — PLAN OF CARE
Goal Outcome Evaluation:  Plan of Care Reviewed With: patient        Progress: no change Alert and confused early in the shift.Slept well after taking melatonin.Very limited mobility. No complaints of discomforts.

## 2022-12-07 NOTE — THERAPY TREATMENT NOTE
Patient Name: Teofilo Brown  : 1935    MRN: [id number]                              Today's Date: 2022       Admit Date: 2022    Visit Dx:     ICD-10-CM ICD-9-CM   1. Closed fracture of proximal end of left femur, initial encounter ([facility])  S72.002A 820.8   2. Fall, initial encounter  W19.XXXA E888.9   3. Urinary retention  R33.9 788.20   4. Impaired mobility and ADLs  Z74.09 V49.89    Z78.9    5. Impaired functional mobility, balance, gait, and endurance  Z74.09 V49.89   6. 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[facility], due to cerebrovascular dz)   • Cerebrovascular disease   • COPD (chronic obstructive pulmonary disease) ([facility])   • Benign essential HTN   • HLD (hyperlipidemia)   • CAD (coronary artery disease)   • Closed fracture of proximal end of left femur, initial encounter ([facility])   • Urinary retention     Past Medical History:   Diagnosis Date   • Dementia ([facility])    • Hyperlipidemia    • Myocardial infarction ([facility])      Past Surgical History:   Procedure Laterality Date   • CAROTID STENT        General Information     Row Name 22 1422 22 1008       OT Time and Intention    Document Type therapy note (daily note)  -SA therapy note (daily note)  -    Mode of Treatment individual therapy;occupational therapy  - individual therapy;occupational therapy  -    Row Name 22 1422 22 1008       General Information    Patient Profile Reviewed yes  -SA yes  -    Existing Precautions/Restrictions fall  -SA fall  -    Row Name 22 1422 22 1008       Cognition    Orientation Status (Cognition) oriented to;person  -SA oriented to;person  -    Row Name 22 1422 22 1008       Safety Issues, Functional Mobility    Impairments Affecting Function (Mobility) balance;endurance/activity tolerance;grasp;pain;strength;shortness of  breath  - balance;endurance/activity tolerance;grasp;pain;strength;shortness of breath  -          User Key  (r) = Recorded By, (t) = Taken By, (c) = Cosigned By    Initials Name Provider Type    Ange Estes, JANNA Occupational Therapist    Nicole Inman OT Occupational Therapist                 Mobility/ADL's     Row Name 12/07/22 1422          Bed Mobility    Bed Mobility supine-sit;sit-supine  -     Supine-Sit [place] (Bed Mobility) moderate assist (50% patient effort)  -     Sit-Supine [place] (Bed Mobility) moderate assist (50% patient effort)  -     Assistive Device (Bed Mobility) bed rails  -     Row Name 12/07/22 1422          Sit-Stand Transfer    Sit-Stand [place] (Transfers) not tested  -[facility] Name 12/07/22 1422 12/07/22 1008       Activities of Daily Living    BADL Assessment/Intervention grooming;feeding  - upper body dressing  -    Row Name 12/07/22 1422 12/07/22 1008       Mobility    Extremity Weight-bearing Status left lower extremity  - left lower extremity  -    Left Lower Extremity (Weight-bearing Status) weight-bearing as tolerated (WBAT)  - weight-bearing as tolerated (WBAT)  -    Row Name 12/07/22 1422          Self-Feeding Assessment/Training    [place] Level (Feeding) liquids to mouth;contact guard assist;nonverbal cues (demo/gesture)  -     Position (Self-Feeding) edge of bed sitting  -[facility] Name 12/07/22 1422          Grooming Assessment/Training    [place] Level (Grooming) wash face, hands;verbal cues;contact guard assist  -     Position (Grooming) edge of bed sitting  -[facility] Name 12/07/22 1008          Upper Body Dressing Assessment/Training    [place] Level (Upper Body Dressing) don;other (see comments);maximum assist (25% patient effort)  hospital gown  -     Position (Upper Body Dressing) supine  -           User Key  (r) = Recorded By, (t) = Taken By, (c) = Cosigned By    Initials Name Provider  Type     Ange Horowitz OT Occupational Therapist     Nicole Blue OT Occupational Therapist               Obj/Interventions     Row Name 12/07/22 1422          Shoulder (Therapeutic Exercise)    Shoulder (Therapeutic Exercise) strengthening exercise  -     Shoulder Strengthening (Therapeutic Exercise) bilateral;flexion;extension;aBduction;aDduction;2 lb free weight;10 repetitions  -     Row Name 12/07/22 1422          Elbow/Forearm (Therapeutic Exercise)    Elbow/Forearm (Therapeutic Exercise) strengthening exercise  -     Elbow/Forearm Strengthening (Therapeutic Exercise) bilateral;flexion;extension;2 lb free weight;10 repetitions  -     Row Name 12/07/22 1422          Motor Skills    Therapeutic Exercise shoulder;elbow/forearm  -[facility] Name 12/07/22 1422          Balance    Balance Assessment sitting static balance;sitting dynamic balance  -     Static Sitting Balance standby assist  -     Dynamic Sitting Balance contact guard  -     Position, Sitting Balance sitting edge of bed  -           User Key  (r) = Recorded By, (t) = Taken By, (c) = Cosigned By    Initials Name Provider Type     Nicole Blue OT Occupational Therapist               Goals/Plan     Row Name 12/07/22 1422 12/07/22 1008       Transfer Goal 1 (OT)    Activity/Assistive Device (Transfer Goal 1, OT) toilet  - toilet  -    [place] Level/Cues Needed (Transfer Goal 1, OT) minimum assist (75% or more patient effort)  - minimum assist (75% or more patient effort)  -    Time Frame (Transfer Goal 1, OT) long term goal (LTG);by discharge  - long term goal (LTG);by discharge  -    Progress/Outcome (Transfer Goal 1, OT) goal not met  - goal not met  -    Row Name 12/07/22 1422 12/07/22 1008       Bathing Goal 1 (OT)    Activity/Device (Bathing Goal 1, OT) lower body bathing  - lower body bathing  -    [place] Level/Cues Needed (Bathing Goal 1, OT) minimum assist (75% or more patient effort)   -SA minimum assist (75% or more patient effort)  -    Time Frame (Bathing Goal 1, OT) long term goal (LTG);by discharge  - long term goal (LTG);by discharge  -    Progress/Outcomes (Bathing Goal 1, OT) goal not met  -SA goal not met  -    Row Name 12/07/22 1422 12/07/22 1008       Dressing Goal 1 (OT)    Activity/Device (Dressing Goal 1, OT) lower body dressing  -SA lower body dressing  -    [place]/Cues Needed (Dressing Goal 1, OT) moderate assist (50-74% patient effort)  -SA moderate assist (50-74% patient effort)  -    Time Frame (Dressing Goal 1, OT) long term goal (LTG);by discharge  - long term goal (LTG);by discharge  -    Progress/Outcome (Dressing Goal 1, OT) goal not met  -SA goal not met  -    Row Name 12/07/22 1422 12/07/22 1008       Toileting Goal 1 (OT)    Activity/Device (Toileting Goal 1, OT) toileting skills, all  -SA toileting skills, all  -    [place] Level/Cues Needed (Toileting Goal 1, OT) minimum assist (75% or more patient effort)  -SA minimum assist (75% or more patient effort)  -    Time Frame (Toileting Goal 1, OT) long term goal (LTG);by discharge  - long term goal (LTG);by discharge  -    Progress/Outcome (Toileting Goal 1, OT) goal not met  - goal not met  -          User Key  (r) = Recorded By, (t) = Taken By, (c) = Cosigned By    Initials Name Provider Type    Ange Estes, OT Occupational Therapist    Nicole Inman OT Occupational Therapist               Clinical Impression     Row Name 12/07/22 1422 12/07/22 1008       Pain Assessment    Pretreatment Pain Rating 0/10 - no pain  - 0/10 - no pain  -    Posttreatment Pain Rating 0/10 - no pain  - 0/10 - no pain  -    Row Name 12/07/22 1422 12/07/22 1008       Plan of Care Review    Plan of Care Reviewed With patient  -SA patient;spouse  -    Progress -- no change  -    Outcome Evaluation OT treatment completed. Patient only oriented to self. Pt only able to follow  commands about 50% of the time with verbal and tactile cues. Pt completed sup<>sit with mod A. Patient able to tolerate sitting EOB with SBA/CGA at times for ~10 minutes to complete grooming (wash face) and BUE exercises (straight arm raises and bicep curl) x 1 set x 10 reps using 2 Lb free weight. No goals met this date. Cont OT POC.  -SA OT tx completed. Pt supine and sleeping upon entering. Pt difficult to arouse this AM, pt followed 10% of verbal commands. Pt max A for [facility] gown while supine in bed. OT and spouse attempted to motivate pt to eat breakfast, pt reported that he just wanted to nap. No goals met, continue OT POC.  -    Row Name 12/07/22 1422 12/07/22 1008       Therapy Assessment/Plan (OT)    Rehab Potential (OT) good, to achieve stated therapy goals  - fair, will monitor progress closely  -    Criteria for Skilled Therapeutic Interventions Met (OT) yes;skilled treatment is necessary  - yes;skilled treatment is necessary  -    Therapy Frequency (OT) daily  - daily  -    Row Name 12/07/22 1422          Therapy Plan Review/Discharge Plan (OT)    Anticipated Discharge Disposition (OT) skilled nursing facility  -     Row Name 12/07/22 1422 12/07/22 1008       Positioning and Restraints    Pre-Treatment Position in bed  - in bed  -    Post Treatment Position bed  -SA bed  -    In Bed call light within reach;encouraged to call for assist;exit alarm on  - supine;call light within reach;encouraged to call for assist;exit alarm on;with family/caregiver  -          User Key  (r) = Recorded By, (t) = Taken By, (c) = Cosigned By    Initials Name Provider Type    Ange Estes OT Occupational Therapist    Nicole Inman OT Occupational Therapist               Outcome Measures     Row Name 12/07/22 1422 12/07/22 1008       How much help from another is currently needed...    Putting on and taking off regular lower body clothing? 1  -SA 1  -    Bathing (including  washing, rinsing, and drying) 2  -SA 2  -    Toileting (which includes using toilet bed pan or urinal) 1  -SA 1  -MC    Putting on and taking off regular upper body clothing 2  -SA 2  -MC    Taking care of personal grooming (such as brushing teeth) 3  -SA 3  -MC    Eating meals 3  -SA 3  -    AM-PAC 6 Clicks Score (OT) 12 -SA 12  -    Row Name 12/07/22 1422 12/07/22 1008       Functional Assessment    Outcome Measure Options AM-PAC 6 Clicks Daily Activity (OT)  - AM-PAC 6 Clicks Daily Activity (OT)  -          User Key  (r) = Recorded By, (t) = Taken By, (c) = Cosigned By    Initials Name Provider Type     Ange Horowitz OT Occupational Therapist    Nicole Inman OT Occupational Therapist                Occupational Therapy Education     Title: PT OT SLP Therapies (In Progress)     Topic: Occupational Therapy (In Progress)     Point: ADL training (In Progress)     Description:   Instruct learner(s) on proper safety adaptation and remediation techniques during self care or transfers.   Instruct in proper use of assistive devices.              Learning Progress Summary           Patient Acceptance, E,TB, NR by  at 12/7/2022 1522    Acceptance, E,TB, NR by  at 12/7/2022 1456    Comment: OT POC, Role of OT, transfer training    Acceptance, E,TB, NR by  at 12/6/2022 1118    Comment: OT POC, ADL engagement    Acceptance, E,TB, NR by  at 12/5/2022 1420    Comment: OT role, POC    Acceptance, E,TB, NR by  at 12/2/2022 1501    Comment: POC, role of OT                   Point: Home exercise program (Not Started)     Description:   Instruct learner(s) on appropriate technique for monitoring, assisting and/or progressing therapeutic exercises/activities.              Learner Progress:  Not documented in this visit.          Point: Precautions (In Progress)     Description:   Instruct learner(s) on prescribed precautions during self-care and functional transfers.              Learning Progress  Summary           Patient Acceptance, E,TB, NR by  at 12/7/2022 1522    Acceptance, E,TB, NR by  at 12/7/2022 1456    Comment: OT POC, Role of OT, transfer training                   Point: Body mechanics (In Progress)     Description:   Instruct learner(s) on proper positioning and spine alignment during self-care, functional mobility activities and/or exercises.              Learning Progress Summary           Patient Acceptance, E,TB, NR by  at 12/7/2022 1522    Acceptance, E,TB, NR by  at 12/7/2022 1456    Comment: OT POC, Role of OT, transfer training                               User Key     Initials Effective Dates Name Provider Type Discipline     06/14/21 -  Annemarie Funes OT Occupational Therapist OT     10/19/22 -  Ange Horowitz, OT Occupational Therapist OT     08/11/22 -  Nicole Blue, OT Occupational Therapist OT     11/18/22 -  Tabatha Armendariz OT Occupational Therapist OT              OT Recommendation and Plan  Therapy Frequency (OT): daily  Plan of Care Review  Plan of Care Reviewed With: patient, spouse  Progress: no change  Outcome Evaluation: OT tx completed. Pt supine and sleeping upon entering. Pt difficult to arouse this AM, pt followed 10% of verbal commands. Pt max A for [facility] gown while supine in bed. OT and spouse attempted to motivate pt to eat breakfast, pt reported that he just wanted to nap. No goals met, continue OT POC.     Time Calculation:    Time Calculation- OT     Row Name 12/07/22 1422 12/07/22 1008          Time Calculation- OT    OT Start Time 1422  -SA 1008  -     OT Stop Time 1435  -SA 1021  -     OT Time Calculation (min) 13 min  - 13 min  -     Total Timed Code Minutes- OT 13 minute(s)  - 13 minute(s)  -     OT Received On 12/07/22  - 12/07/22  -        Timed Charges    11571 - OT Therapeutic Activity Minutes 13  -SA --     47568 - OT Self Care/Mgmt Minutes -- 13  -        Total Minutes    Timed Charges Total Minutes  13 - 13  -      Total Minutes 13 -SA 13  -           User Key  (r) = Recorded By, (t) = Taken By, (c) = Cosigned By    Initials Name Provider Type    Ange Estes OT Occupational Therapist    Nicole Inman OT Occupational Therapist              Therapy Charges for Today     Code Description Service Date Service Provider Modifiers Qty    [id number]  OT THERAPEUTIC ACT EA 15 MIN 12/7/2022 Ange Horowitz OT GO 1               Ange Horowitz OT  12/7/2022

## 2022-12-07 NOTE — PROGRESS NOTES
[facility] Medicine Services  INPATIENT PROGRESS NOTE    Length of Stay: 6  Date of Admission: 12/1/2022  Primary Care Physician: Nicole Burgos APRN    Subjective   Chief Complaint: Confusion  HPI:      No changes overnight   resting     Review of Systems   Unable to perform ROS: Dementia      All pertinent negatives and positives are as above. All other systems have been reviewed and are negative unless otherwise stated.     Objective    As of today 12/07/22  Temp:  [96 °F (35.6 °C)-97.3 °F (36.3 °C)] 97.2 °F (36.2 °C)  Heart Rate:  [] 78  Resp:  [18-22] 18  BP: (126-162)/(67-77) 162/75    Physical Exam  Constitutional:       General: He is not in acute distress.     Appearance: He is not toxic-appearing.   HENT:      Head: Normocephalic and atraumatic.      Right Ear: External ear normal.      Left Ear: External ear normal.      Nose: Nose normal.      Mouth/Throat:      Mouth: Mucous membranes are moist.   Eyes:      Conjunctiva/sclera: Conjunctivae normal.   Cardiovascular:      Rate and Rhythm: Normal rate and regular rhythm.      Pulses: Normal pulses.      Heart sounds: Normal heart sounds.   Pulmonary:      Effort: Pulmonary effort is normal. No respiratory distress.      Breath sounds: Normal breath sounds.   Abdominal:      General: Bowel sounds are normal.      Palpations: Abdomen is soft.      Tenderness: There is no abdominal tenderness.   Musculoskeletal:         General: No deformity.   Skin:     General: Skin is warm and dry.      Capillary Refill: Capillary refill takes less than 2 seconds.   Neurological:      General: No focal deficit present.           Results Review:  I have reviewed the labs, radiology results, and diagnostic studies.    Laboratory Data:   Results from last 7 days   Lab Units 12/05/22  2233 12/05/22  1136 12/05/22  0604 12/04/22  1411 12/03/22  0701 12/02/22  0708   SODIUM mmol/L  --   --  142 143 146* 143   POTASSIUM mmol/L  4.0 3.3* 3.3* 3.4* 3.6 3.3*   CHLORIDE mmol/L  --   --  107 108* 109* 107   CO2 mmol/L  --   --  26.0 24.0 24.0 22.0   BUN mg/dL  --   --  22 26* 24* 20   CREATININE mg/dL  --   --  1.15 1.28* 1.34* 1.15   GLUCOSE mg/dL  --   --  118* 121* 119* 156*   CALCIUM mg/dL  --   --  8.9 8.7 9.3 9.6   BILIRUBIN mg/dL  --   --   --  0.3 0.5 0.9   ALK PHOS U/L  --   --   --  80 80 93   ALT (SGPT) U/L  --   --   --  <5 9 14   AST (SGOT) U/L  --   --   --  31 29 21   ANION GAP mmol/L  --   --  9.0 11.0 13.0 14.0     Estimated Creatinine Clearance: 35.7 mL/min (by C-G formula based on SCr of 1.15 mg/dL).  Results from last 7 days   Lab Units 12/02/22  0708   MAGNESIUM mg/dL 1.5*         Results from last 7 days   Lab Units 12/05/22  0604 12/04/22  1411 12/03/22  0701 12/02/22  1318 12/02/22  0708 12/01/22  2145   WBC 10*3/mm3 7.52 8.34 9.78  --  11.06* 11.40*   HEMOGLOBIN g/dL 12.2* 12.3* 13.0 14.4 14.5 14.6   HEMATOCRIT % 35.0* 36.8* 38.2 42.6 42.3 42.0   PLATELETS 10*3/mm3 142 141 110*  --  145 150     Results from last 7 days   Lab Units 12/01/22  2338   INR  1.21*       Culture Data:   No results found for: BLOODCX  No results found for: URINECX  No results found for: RESPCX  No results found for: WOUNDCX  No results found for: STOOLCX  No components found for: BODYFLD    Radiology Data:   Imaging Results (Last 24 Hours)     ** No results found for the last 24 hours. **          I have reviewed the patient's current medications.     Assessment/Plan     Principal Problem:    Closed fracture of proximal end of left femur, initial encounter ([facility])  Active Problems:    Urinary retention  Dementia    Plan:  - Appreciate orthopedic surgery assistance  - Status post intramedullary rodding of the left hip on 12/2/2022  - Appreciate urology assistance, Godwin catheter to stay in at discharge  - Continue home medications as appropriate  - Continue working with PT and OT  - No longer receiving IV medications.  As attempting to have  another IV would likely only result in further agitation we will plan on leaving it out for now.   - DVT prophylaxis with Lovenox  - CODE STATUS: Full      I confirmed that the patient's Advance Care Plan is present, code status is documented, or surrogate decision maker is listed in the patient's medical record.      Discharge Planning: per case mgt, he can be discharged to SNF on Friday    Lurdes Paz MD

## 2022-12-07 NOTE — PLAN OF CARE
Goal Outcome Evaluation:      VSS. Pt resting this shift, Wife is at bedside today. Urine clear dark sahara with small amount of mucous threads. D/c postponed due to receiving facility.

## 2022-12-07 NOTE — PROGRESS NOTES
ORTHOPEDIC PROGRESS NOTE:    Name:  Teofilo Brown  Date:    2022  Date of admission:  2022    Post op day:  3 Days Post-Op  Procedure:    Procedure(s) (LRB):  CYSTOSCOPY (Bilateral)    Subjective:  No new complaints per nursing    Vitals:     Vitals:    22 0429   BP:    Pulse: 85   Resp:    Temp:    SpO2:       Temp (24hrs), Av.6 °F (36.4 °C), Min:96.4 °F (35.8 °C), Max:98.8 °F (37.1 °C)      Exam:  Alert  Incisions clean and dry  Toes up and down  Calves soft and nontender    ASSESSMENT:  Active Hospital Problems    Diagnosis  POA   • **Closed fracture of proximal end of left femur, initial encounter ([facility]) [S72.002A]  Yes   • Urinary retention [R33.9]  Unknown     Added automatically from request for surgery [id number]           PLAN:    S/p IM agustin left hip  Mobilize with PT/OT  DVT prophylaxis - lovenox  No true restrictions  Disposition - SNF              Staples can be removed POD 14              No restrictions              Advance weight bearing as tolerated.              Get mobile xray at 2 weeks postop and send to ortho office.              F/u in ortho in 4 weeks (can be sooner if felt necessary by SNF provider)       22 at 07:23 CST by Juan Salas MD

## 2022-12-07 NOTE — THERAPY TREATMENT NOTE
Patient Name: Teofilo Brown  : 1935    MRN: [id number]                              Today's Date: 2022       Admit Date: 2022    Visit Dx:     ICD-10-CM ICD-9-CM   1. Closed fracture of proximal end of left femur, initial encounter ([facility])  S72.002A 820.8   2. Fall, initial encounter  W19.XXXA E888.9   3. Urinary retention  R33.9 788.20   4. Impaired mobility and ADLs  Z74.09 V49.89    Z78.9    5. Impaired functional mobility, balance, gait, and endurance  Z74.09 V49.89   6. 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, due to cerebrovascular dz)   • Cerebrovascular disease   • COPD (chronic obstructive pulmonary disease) ([facility])   • Benign essential HTN   • HLD (hyperlipidemia)   • CAD (coronary artery disease)   • Closed fracture of proximal end of left femur, initial encounter ([facility])   • Urinary retention     Past Medical History:   Diagnosis Date   • Dementia ([facility])    • Hyperlipidemia    • Myocardial infarction ([facility])      Past Surgical History:   Procedure Laterality Date   • CAROTID STENT        General Information     Row Name 22 142          OT Time and Intention    Document Type therapy note (daily note)  -     Mode of Treatment individual therapy;occupational therapy  -     Row Name 22 142          General Information    Patient Profile Reviewed yes  -SA     Existing Precautions/Restrictions fall  -     Row Name 22 1422          Cognition    Orientation Status (Cognition) oriented to;person  -     Row Name 22 142          Safety Issues, Functional Mobility    Impairments Affecting Function (Mobility) balance;endurance/activity tolerance;grasp;pain;strength;shortness of breath  -           User Key  (r) = Recorded By, (t) = Taken By, (c) = Cosigned By    Initials Name Provider Type    Nicole Inman OT Occupational  Therapist                 Mobility/ADL's     Row Name 12/07/22 1422          Bed Mobility    Bed Mobility supine-sit;sit-supine  -     Supine-Sit [place] (Bed Mobility) moderate assist (50% patient effort)  -     Sit-Supine [place] (Bed Mobility) moderate assist (50% patient effort)  -     Assistive Device (Bed Mobility) bed rails  -     Row Name 12/07/22 1422          Sit-Stand Transfer    Sit-Stand [place] (Transfers) not tested  -[facility] Name 12/07/22 1422          Activities of Daily Living    BADL Assessment/Intervention grooming;feeding  -[facility] Name 12/07/22 1422          Mobility    Extremity Weight-bearing Status left lower extremity  -     Left Lower Extremity (Weight-bearing Status) weight-bearing as tolerated (WBAT)  -[facility] Name 12/07/22 1422          Self-Feeding Assessment/Training    [place] Level (Feeding) liquids to mouth;contact guard assist;nonverbal cues (demo/gesture)  -     Position (Self-Feeding) edge of bed sitting  -[facility] Name 12/07/22 1422          Grooming Assessment/Training    [place] Level (Grooming) wash face, hands;verbal cues;contact guard assist  -     Position (Grooming) edge of bed sitting  -           User Key  (r) = Recorded By, (t) = Taken By, (c) = Cosigned By    Initials Name Provider Type    Nicole Inman OT Occupational Therapist               Obj/Interventions     [facility] Name 12/07/22 1422          Shoulder (Therapeutic Exercise)    Shoulder (Therapeutic Exercise) strengthening exercise  -     Shoulder Strengthening (Therapeutic Exercise) bilateral;flexion;extension;aBduction;aDduction;2 lb free weight;10 repetitions  -[facility] Name 12/07/22 1422          Elbow/Forearm (Therapeutic Exercise)    Elbow/Forearm (Therapeutic Exercise) strengthening exercise  -     Elbow/Forearm Strengthening (Therapeutic Exercise) bilateral;flexion;extension;2 lb free weight;10 repetitions  -[facility] Name 12/07/22 1422           Motor Skills    Therapeutic Exercise shoulder;elbow/forearm  -[facility] Name 12/07/22 [facility]          Balance    Balance Assessment sitting static balance;sitting dynamic balance  -SA     Static Sitting Balance standby assist  -SA     Dynamic Sitting Balance contact guard  -SA     Position, Sitting Balance sitting edge of bed  -SA           User Key  (r) = Recorded By, (t) = Taken By, (c) = Cosigned By    Initials Name Provider Type    Nicole Inman, OT Occupational Therapist               Goals/Plan     Row Name 12/07/22 [facility]          Transfer Goal 1 (OT)    Activity/Assistive Device (Transfer Goal 1, OT) toilet  -SA     [place] Level/Cues Needed (Transfer Goal 1, OT) minimum assist (75% or more patient effort)  -SA     Time Frame (Transfer Goal 1, OT) long term goal (LTG);by discharge  -SA     Progress/Outcome (Transfer Goal 1, OT) goal not met  -[facility] Name 12/07/22 [facility]          Bathing Goal 1 (OT)    Activity/Device (Bathing Goal 1, OT) lower body bathing  -SA     [place] Level/Cues Needed (Bathing Goal 1, OT) minimum assist (75% or more patient effort)  -SA     Time Frame (Bathing Goal 1, OT) long term goal (LTG);by discharge  -SA     Progress/Outcomes (Bathing Goal 1, OT) goal not met  -[facility] Name 12/07/22 [facility]          Dressing Goal 1 (OT)    Activity/Device (Dressing Goal 1, OT) lower body dressing  -SA     [place]/Cues Needed (Dressing Goal 1, OT) moderate assist (50-74% patient effort)  -SA     Time Frame (Dressing Goal 1, OT) long term goal (LTG);by discharge  -SA     Progress/Outcome (Dressing Goal 1, OT) goal not met  -[facility] Name 12/07/22 [facility]          Toileting Goal 1 (OT)    Activity/Device (Toileting Goal 1, OT) toileting skills, all  -SA     [place] Level/Cues Needed (Toileting Goal 1, OT) minimum assist (75% or more patient effort)  -SA     Time Frame (Toileting Goal 1, OT) long term goal (LTG);by discharge  -SA     Progress/Outcome (Toileting Goal 1, OT)  goal not met  -           User Key  (r) = Recorded By, (t) = Taken By, (c) = Cosigned By    Initials Name Provider Type    Nicole Inman OT Occupational Therapist               Clinical Impression     Row Name 12/07/22 1422          Pain Assessment    Pretreatment Pain Rating 0/10 - no pain  -     Posttreatment Pain Rating 0/10 - no pain  -     Row Name 12/07/22 1422          Plan of Care Review    Plan of Care Reviewed With patient  -     Outcome Evaluation OT treatment completed. Patient only oriented to self. Pt only able to follow commands about 50% of the time with verbal and tactile cues. Pt completed sup<>sit with mod A. Patient able to tolerate sitting EOB with SBA/CGA at times for ~10 minutes to complete grooming (wash face) and BUE exercises (straight arm raises and bicep curl) x 1 set x 10 reps using 2 Lb free weight. No goals met this date. Cont OT POC.  -     Row Name 12/07/22 1422          Therapy Assessment/Plan (OT)    Rehab Potential (OT) good, to achieve stated therapy goals  -     Criteria for Skilled Therapeutic Interventions Met (OT) yes;skilled treatment is necessary  -     Therapy Frequency (OT) daily  -     Row Name 12/07/22 1422          Therapy Plan Review/Discharge Plan (OT)    Anticipated Discharge Disposition (OT) skilled nursing facility  -     Row Name 12/07/22 1422          Positioning and Restraints    Pre-Treatment Position in bed  -SA     Post Treatment Position bed  -SA     In Bed call light within reach;encouraged to call for assist;exit alarm on  -           User Key  (r) = Recorded By, (t) = Taken By, (c) = Cosigned By    Initials Name Provider Type    Nicole Inman OT Occupational Therapist               Outcome Measures     Row Name 12/07/22 1422          How much help from another is currently needed...    Putting on and taking off regular lower body clothing? 1  -SA     Bathing (including washing, rinsing, and drying) 2  -SA     Toileting  (which includes using toilet bed pan or urinal) 1  -SA     Putting on and taking off regular upper body clothing 2  -SA     Taking care of personal grooming (such as brushing teeth) 3  -SA     Eating meals 3  -SA     AM-PAC 6 Clicks Score (OT) 12  -SA     Row Name 12/07/22 1422          Functional Assessment    Outcome Measure Options AM-PAC 6 Clicks Daily Activity (OT)  -           User Key  (r) = Recorded By, (t) = Taken By, (c) = Cosigned By    Initials Name Provider Type    Nicole Inman OT Occupational Therapist                Occupational Therapy Education     Title: PT OT SLP Therapies (In Progress)     Topic: Occupational Therapy (In Progress)     Point: ADL training (In Progress)     Description:   Instruct learner(s) on proper safety adaptation and remediation techniques during self care or transfers.   Instruct in proper use of assistive devices.              Learning Progress Summary           Patient Acceptance, E,TB, NR by  at 12/7/2022 1456    Comment: OT POC, Role of OT, transfer training    Acceptance, E,TB, NR by CM at 12/6/2022 1118    Comment: OT POC, ADL engagement    Acceptance, E,TB, NR by MC at 12/5/2022 1420    Comment: OT role, POC    Acceptance, E,TB, NR by SJ at 12/2/2022 1501    Comment: POC, role of OT                   Point: Home exercise program (Not Started)     Description:   Instruct learner(s) on appropriate technique for monitoring, assisting and/or progressing therapeutic exercises/activities.              Learner Progress:  Not documented in this visit.          Point: Precautions (In Progress)     Description:   Instruct learner(s) on prescribed precautions during self-care and functional transfers.              Learning Progress Summary           Patient Acceptance, E,TB, NR by  at 12/7/2022 1456    Comment: OT POC, Role of OT, transfer training                   Point: Body mechanics (In Progress)     Description:   Instruct learner(s) on proper positioning and  spine alignment during self-care, functional mobility activities and/or exercises.              Learning Progress Summary           Patient Acceptance, E,TB, NR by  at 12/7/2022 1456    Comment: OT POC, Role of OT, transfer training                               User Key     Initials Effective Dates Name Provider Type Discipline     06/14/21 -  Annemarie Funes OT Occupational Therapist OT     10/19/22 -  Ange Horowitz OT Occupational Therapist OT     08/11/22 -  Nicole Blue OT Occupational Therapist OT     11/18/22 -  Tabatha Armendariz OT Occupational Therapist OT              OT Recommendation and Plan  Therapy Frequency (OT): daily  Plan of Care Review  Plan of Care Reviewed With: patient  Outcome Evaluation: OT treatment completed. Patient only oriented to self. Pt only able to follow commands about 50% of the time with verbal and tactile cues. Pt completed sup<>sit with mod A. Patient able to tolerate sitting EOB with SBA/CGA at times for ~10 minutes to complete grooming (wash face) and BUE exercises (straight arm raises and bicep curl) x 1 set x 10 reps using 2 Lb free weight. No goals met this date. Cont OT POC.     Time Calculation:    Time Calculation- OT     Row Name 12/07/22 1422             Time Calculation- OT    OT Start Time 1422  -      OT Stop Time 1435  -      OT Time Calculation (min) 13 min  -      Total Timed Code Minutes- OT 13 minute(s)  -      OT Received On 12/07/22  -         Timed Charges    84081 - OT Therapeutic Activity Minutes 13  -SA         Total Minutes    Timed Charges Total Minutes 13  -       Total Minutes 13  -            User Key  (r) = Recorded By, (t) = Taken By, (c) = Cosigned By    Initials Name Provider Type     Nicole Blue OT Occupational Therapist              Therapy Charges for Today     Code Description Service Date Service Provider Modifiers Qty    27738300695 HC OT THERAPEUTIC ACT EA 15 MIN 12/7/2022 Nicole Blue OT GO 1                Nicole Blue, OT  12/7/2022

## 2022-12-08 PROCEDURE — 94799 UNLISTED PULMONARY SVC/PX: CPT

## 2022-12-08 PROCEDURE — 94760 N-INVAS EAR/PLS OXIMETRY 1: CPT

## 2022-12-08 PROCEDURE — 25010000002 ENOXAPARIN PER 10 MG: Performed by: UROLOGY

## 2022-12-08 PROCEDURE — 97110 THERAPEUTIC EXERCISES: CPT

## 2022-12-08 PROCEDURE — 94664 DEMO&/EVAL PT USE INHALER: CPT

## 2022-12-08 PROCEDURE — 97530 THERAPEUTIC ACTIVITIES: CPT

## 2022-12-08 RX ADMIN — ENOXAPARIN SODIUM 40 MG: 100 INJECTION SUBCUTANEOUS at 07:43

## 2022-12-08 RX ADMIN — Medication 1 TABLET: at 07:43

## 2022-12-08 RX ADMIN — BUDESONIDE AND FORMOTEROL FUMARATE DIHYDRATE 2 PUFF: 80; 4.5 AEROSOL RESPIRATORY (INHALATION) at 07:14

## 2022-12-08 RX ADMIN — BUDESONIDE AND FORMOTEROL FUMARATE DIHYDRATE 2 PUFF: 80; 4.5 AEROSOL RESPIRATORY (INHALATION) at 20:11

## 2022-12-08 RX ADMIN — IPRATROPIUM BROMIDE AND ALBUTEROL SULFATE 3 ML: 2.5; .5 SOLUTION RESPIRATORY (INHALATION) at 20:11

## 2022-12-08 RX ADMIN — ASPIRIN 81 MG: 81 TABLET, CHEWABLE ORAL at 07:43

## 2022-12-08 RX ADMIN — IPRATROPIUM BROMIDE AND ALBUTEROL SULFATE 3 ML: 2.5; .5 SOLUTION RESPIRATORY (INHALATION) at 15:38

## 2022-12-08 RX ADMIN — ATORVASTATIN CALCIUM 40 MG: 40 TABLET, FILM COATED ORAL at 19:34

## 2022-12-08 RX ADMIN — RIVASTIGMINE TRANSDERMAL SYSTEM 1 PATCH: 4.6 PATCH, EXTENDED RELEASE TRANSDERMAL at 07:47

## 2022-12-08 RX ADMIN — ESCITALOPRAM 5 MG: 5 TABLET, FILM COATED ORAL at 07:43

## 2022-12-08 RX ADMIN — IPRATROPIUM BROMIDE AND ALBUTEROL SULFATE 3 ML: 2.5; .5 SOLUTION RESPIRATORY (INHALATION) at 11:23

## 2022-12-08 RX ADMIN — IPRATROPIUM BROMIDE AND ALBUTEROL SULFATE 3 ML: 2.5; .5 SOLUTION RESPIRATORY (INHALATION) at 07:06

## 2022-12-08 NOTE — THERAPY TREATMENT NOTE
Acute Care - Physical Therapy Treatment Note  [facility]     Patient Name: Teofilo Brown  : 1935  MRN: [id number]  Today's Date: 2022      Visit Dx:     ICD-10-CM ICD-9-CM   1. Closed fracture of proximal end of left femur, initial encounter ([facility])  S72.002A 820.8   2. Fall, initial encounter  W19.XXXA E888.9   3. Urinary retention  R33.9 788.20   4. Impaired mobility and ADLs  Z74.09 V49.89    Z78.9    5. Impaired functional mobility, balance, gait, and endurance  Z74.09 V49.89   6. 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, due to cerebrovascular dz)   • Cerebrovascular disease   • COPD (chronic obstructive pulmonary disease) ([facility])   • Benign essential HTN   • HLD (hyperlipidemia)   • CAD (coronary artery disease)   • Closed fracture of proximal end of left femur, initial encounter ([facility])   • Urinary retention     Past Medical History:   Diagnosis Date   • Dementia ([facility])    • Hyperlipidemia    • Myocardial infarction ([facility])      Past Surgical History:   Procedure Laterality Date   • CAROTID STENT       PT Assessment (last 12 hours)     PT Evaluation and Treatment     Row Name 22 [facility]          Physical Therapy Time and Intention    Subjective Information complains of;pain  -TW     Document Type therapy note (daily note)  -TW     Mode of Treatment physical therapy;individual therapy  -TW     Patient Effort fair  -TW     Row Name 22 1321          General Information    Patient Profile Reviewed yes  -TW     Patient Observations alert;agree to therapy  -TW     Patient/Family/Caregiver Comments/Observations none  -TW     General Observations of Patient Pt sup in bed.  -TW     Existing Precautions/Restrictions fall  -TW     Row Name 22 1321          Pain    Pretreatment Pain Rating 0/10 - no pain  -TW     Posttreatment Pain Rating 3/10  -TW     Pain  Location - Side/Orientation Left  -TW     Pain Location lower  -TW     Pain Location - extremity  -TW     Row Name 12/08/22 1321          Cognition    Affect/Mental Status (Cognition) confused  -TW     Orientation Status (Cognition) oriented to;person  -TW     Follows Commands (Cognition) follows one-step commands;25-49% accuracy  -TW     Personal Safety Interventions fall prevention program maintained;muscle strengthening facilitated;nonskid shoes/slippers when out of bed  -TW     Row Name 12/08/22 1321          Mobility    Extremity Weight-bearing Status left lower extremity  -TW     Left Lower Extremity (Weight-bearing Status) weight-bearing as tolerated (WBAT)  -TW     Row Name 12/08/22 1321          Bed Mobility    Bed Mobility supine-sit;sit-supine  -TW     [place] (Bed Mobility) minimum assist (75% patient effort);moderate assist (50% patient effort);verbal cues  -TW     Supine-Sit [place] (Bed Mobility) moderate assist (50% patient effort)  -TW     Sit-Supine [place] (Bed Mobility) moderate assist (50% patient effort);maximum assist (25% patient effort);1 person assist  -TW     Assistive Device (Bed Mobility) bed rails  -TW     Comment, (Bed Mobility) Pt sat EOB for 10 minutes with VCs and CGA.  -     Row Name 12/08/22 1321          Transfers    Transfers sit-stand transfer;stand-sit transfer;toilet transfer  -     Row Name 12/08/22 1321          Sit-Stand Transfer    Sit-Stand [place] (Transfers) not tested  -     Row Name 12/08/22 1321          Gait/Stairs (Locomotion)    [place] Level (Gait) not tested  -     Row Name 12/08/22 1321          Hip (Therapeutic Exercise)    Hip AAROM (Therapeutic Exercise) bilateral;supine  -TW     Row Name 12/08/22 1321          Knee (Therapeutic Exercise)    Knee AAROM (Therapeutic Exercise) bilateral;supine  -TW     Row Name 12/08/22 1321          Ankle (Therapeutic Exercise)    Ankle AROM (Therapeutic Exercise)  bilateral;sitting;supine  -TW     Row Name             Wound 12/02/22 Left hip Incision    Wound - Properties Group Placement Date: 12/02/22  -JM Present on Hospital Admission: N  -JM Side: Left  -JM Location: hip  -JM Primary Wound Type: Incision  -JM    Retired Wound - Properties Group Placement Date: 12/02/22  -JM Present on Hospital Admission: N  -JM Side: Left  -JM Location: hip  -JM Primary Wound Type: Incision  -JM    Retired Wound - Properties Group Date first assessed: 12/02/22  -JM Present on Hospital Admission: N  -JM Side: Left  -JM Location: hip  -JM Primary Wound Type: Incision  -JM    Row Name 12/08/22 1321          Plan of Care Review    Plan of Care Reviewed With patient  -TW     Progress no change  -TW     Outcome Evaluation Pt still very confused but more alert today than previous days. Pt able to perform B LE AAROM with pt in sup but requires extended time due to pt easily goes off tasks and requires frequent redirection. Pt t/f sup to sit with mod of 1 and able to maintain sitting balance with CGA for 10 minutes. Pt returned to sup with max of 1 and positioned in bed for comfort. Pt would cont to benefit from therapy upon DC.  -TW     Row Name 12/08/22 1321          Vital Signs    Pretreatment Heart Rate (beats/min) 94  -TW     Posttreatment Heart Rate (beats/min) 94  -TW     Pre SpO2 (%) 100  -TW     O2 Delivery Pre Treatment room air  -TW     Post SpO2 (%) 92  -TW     O2 Delivery Post Treatment room air  -TW     Pre Patient Position Supine  -TW     Intra Patient Position Sitting  -TW     Post Patient Position Supine  -TW     Row Name 12/08/22 1321          Positioning and Restraints    Pre-Treatment Position in bed  -TW     Post Treatment Position bed  -TW     In Bed supine;call light within reach;encouraged to call for assist;exit alarm on  -TW     Row Name 12/08/22 1321          Therapy Assessment/Plan (PT)    Rehab Potential (PT) fair, will monitor progress closely  -TW     Row Name  12/08/22 1321          Bed Mobility Goal 1 (PT)    Activity/Assistive Device (Bed Mobility Goal 1, PT) sit to supine;supine to sit  -TW     [place] Level/Cues Needed (Bed Mobility Goal 1, PT) standby assist  -TW     Time Frame (Bed Mobility Goal 1, PT) by discharge  -TW     Progress/Outcomes (Bed Mobility Goal 1, PT) goal not met  -TW     Row Name 12/08/22 1321          Transfer Goal 1 (PT)    Activity/Assistive Device (Transfer Goal 1, PT) sit-to-stand/stand-to-sit;bed-to-chair/chair-to-bed  -TW     [place] Level/Cues Needed (Transfer Goal 1, PT) standby assist  -TW     Time Frame (Transfer Goal 1, PT) by discharge  -TW     Progress/Outcome (Transfer Goal 1, PT) goal not met  -TW     Row Name 12/08/22 1321          Gait Training Goal 1 (PT)    Activity/Assistive Device (Gait Training Goal 1, PT) gait (walking locomotion);assistive device use  -TW     [place] Level (Gait Training Goal 1, PT) standby assist  -TW     Distance (Gait Training Goal 1, PT) 50'x1  -TW     Time Frame (Gait Training Goal 1, PT) by discharge  -TW     Progress/Outcome (Gait Training Goal 1, PT) goal not met  -TW           User Key  (r) = Recorded By, (t) = Taken By, (c) = Cosigned By    Initials Name Provider Type    TW Dayo Pino PTA Physical Therapist Assistant    Eugenia Harrington, RN Registered Nurse                Physical Therapy Education     Title: PT OT SLP Therapies (In Progress)     Topic: Physical Therapy (In Progress)     Point: Mobility training (In Progress)     Learning Progress Summary           Patient Acceptance, E, NR by LORENA at 12/4/2022 1222    Acceptance, E,TB, VU by LR at 12/2/2022 1458    Comment: Educated on PT POC and goals.                   Point: Home exercise program (Done)     Learning Progress Summary           Patient Acceptance, E,TB, VU by LR at 12/2/2022 1458    Comment: Educated on PT POC and goals.                   Point: Body mechanics (Done)     Learning Progress Summary            Patient Acceptance, E,TB, VU by LR at 12/2/2022 1458    Comment: Educated on PT POC and goals.                   Point: Precautions (Done)     Learning Progress Summary           Patient Acceptance, E,TB, VU by LR at 12/2/2022 1458    Comment: Educated on PT POC and goals.                               User Key     Initials Effective Dates Name Provider Type Discipline    LORENA 06/16/21 -  Percy Camacho PTA Physical Therapist Assistant PT    LR 06/16/21 -  Milan Cole Physical Therapist PT              PT Recommendation and Plan  Anticipated Discharge Disposition (PT): skilled nursing facility  Plan of Care Reviewed With: patient  Progress: no change  Outcome Evaluation: Pt still very confused but more alert today than previous days. Pt able to perform B LE AAROM with pt in sup but requires extended time due to pt easily goes off tasks and requires frequent redirection. Pt t/f sup to sit with mod of 1 and able to maintain sitting balance with CGA for 10 minutes. Pt returned to sup with max of 1 and positioned in bed for comfort. Pt would cont to benefit from therapy upon DC.       Time Calculation:    PT Charges     Row Name 12/08/22 1626             Time Calculation    Start Time 1321  -TW      Stop Time 1400  -TW      Time Calculation (min) 39 min  -TW         Time Calculation- PT    Total Timed Code Minutes- PT 39 minute(s)  -TW            User Key  (r) = Recorded By, (t) = Taken By, (c) = Cosigned By    Initials Name Provider Type    TW Dayo Pino PTA Physical Therapist Assistant              Therapy Charges for Today     Code Description Service Date Service Provider Modifiers Qty    50476070540 HC PT THER SUPP EA 15 MIN 12/7/2022 Dayo Pino PTA GP 1    21630021564 HC PT THER PROC EA 15 MIN 12/8/2022 Dayo Pino PTA GP 1    69184445526 HC PT THERAPEUTIC ACT EA 15 MIN 12/8/2022 Dayo Pino PTA GP 2          PT G-Codes  Outcome Measure Options: AM-PAC 6 Clicks Daily  Activity (OT)  AM-PAC 6 Clicks Score (PT): 14  AM-PAC 6 Clicks Score (OT): 12    Dayo Pino, PTA  12/8/2022

## 2022-12-08 NOTE — PLAN OF CARE
Goal Outcome Evaluation:  Plan of Care Reviewed With: patient        Progress: no change  Confused and restless this am.Removed clothes,and cath secure.Pulling on f/c.Redressed ,cath care completed and applied a new cath secure.Repositioned to the the top of bed. Slept well for most of shift.

## 2022-12-08 NOTE — PLAN OF CARE
Goal Outcome Evaluation:  Plan of Care Reviewed With: patient        Progress: no change  Outcome Evaluation: Pt still very confused but more alert today than previous days. Pt able to perform B LE AAROM with pt in sup but requires extended time due to pt easily goes off tasks and requires frequent redirection. Pt t/f sup to sit with mod of 1 and able to maintain sitting balance with CGA for 10 minutes. Pt returned to sup with max of 1 and positioned in bed for comfort. Pt would cont to benefit from therapy upon DC.

## 2022-12-08 NOTE — SIGNIFICANT NOTE
12/08/22 1612   OTHER   Discipline occupational therapy assistant   Rehab Time/Intention   Session Not Performed other (see comments)   Deferred therapy at this time .

## 2022-12-08 NOTE — PROGRESS NOTES
Adult Nutrition  Assessment/PES    Patient Name:  Teofilo Brown  YOB: 1935  MRN: [id number]  Admit Date:  12/1/2022    Assessment Date:  12/8/2022    Comments:  88yo male assessed by RD for LOS. Pt admit w/ left hip fracture, s/p surgical intervention. Hx dementia, MI. Reg diet, intakes for LOS average 29% meals. # w/ BMI 20.3. Pt slightly confused, attempted to answer some RD's ?s- wife present and assisted. Pt is edentulous, wife thinks pt would beneft from gound meats. She says his intake varies. Doesnt think he has lost wt. NFPE noted mostly moderate fat loss/muscle wasting. Pt meets criteria for moderate, chronic malnutriiton likely r/t decreased intake d/t dementia/advanced age.  Agreed to see if pt will accept Boost Plus/2% milk at meals, possibly yogurt/cottage cheese as well. Discussed ideas for increasing protein/calories and will attach info to print w/ d/c papers. RD to follow hospital course.     Reason for Assessment     Row Name 12/08/22 1524          Reason for Assessment    Reason For Assessment per organizational policy     Diagnosis trauma     Identified At Risk by Screening Criteria other (see comments);difficulty chewing/swallowing;reduced oral intake over the last month  LOS                Nutrition/Diet History     Row Name 12/08/22 1527          Nutrition/Diet History    Typical Intake (Food/Fluid/EN/PN) Pt slightly confused, attempted to answer soem RD/s- wife present and assisted. Pt is edentulous, wife thinks pt would beneft from gound meats. She says his intake varies. Doesnt think he has lost wt/     Food Preferences pt indicates he might like yogurt, cottage cheese--wife states he likes 2% milk     Supplemental Drinks/Foods/Additives will try- prolly preeti     Factors Affecting Nutritional Intake chewing difficulties;appetite;cognitive status/motor function                Labs/Tests/Procedures/Meds     Row Name 12/08/22 7190          Labs/Procedures/Meds     Lab Results Reviewed reviewed     Lab Results Comments Glu 118, alb 3.2L        Diagnostic Tests/Procedures    Diagnostic Test/Procedure Reviewed reviewed        Medications    Pertinent Medications Reviewed reviewed     Pertinent Medications Comments MVI                  Estimated/Assessed Needs - Anthropometrics     Row Name 12/08/22 1529          Anthropometrics    Weight for Calculation 55.3 kg (122 lb)        Estimated/Assessed Needs    Additional Documentation Fluid Requirements (Group);Estimated Calorie Needs (Group);KCAL/KG (Group);Protein Requirements (Group)        Estimated Calorie Needs    Estimated Calorie Requirement (kcal/day) 1600        KCAL/KG    KCAL/KG 30 Kcal/Kg (kcal)     30 Kcal/Kg (kcal) 1660.17        Protein Requirements    Weight Used For Protein Calculations 55.3 kg (122 lb)     Est Protein Requirement Amount (gms/kg) 1.0 gm protein     Estimated Protein Requirements (gms/day) 55.34        Fluid Requirements    Fluid Requirements (mL/day) 1400     RDA Method (mL) 1400                Nutrition Prescription Ordered     Row Name 12/08/22 1529          Nutrition Prescription PO    Current PO Diet Regular                Evaluation of Received Nutrient/Fluid Intake     Row Name 12/08/22 1529          PO Evaluation    Number of Days PO Intake Evaluated Insufficient Data  LOS     Number of Meals 12     % PO Intake 0x4, 25x5, 75x3                Malnutrition Severity Assessment     Row Name 12/08/22 1531          Malnutrition Severity Assessment    Malnutrition Type Chronic Disease - Related Malnutrition        Insufficient Energy Intake     Insufficient Energy Intake Findings Moderate     Insufficient Energy Intake  --  <30% in hospital---possibly 50% at home        Unintentional Weight Loss     Unintentional Weight Loss Findings None  bmi 20.3        Muscle Loss    Loss of Muscle Mass Findings Moderate     Latter-day Region Moderate - slight depression     Clavicle Bone Region Moderate - some  protrusion in females, visible in males     Acromion Bone Region Moderate - acromion may slightly protrude     Scapular Bone Region Moderate - mild depression, bones may show slightly     Dorsal Hand Region Moderate - slight depression     Patellar Region Severe - prominent bone, square looking, very little muscle definition     Anterior Thigh Region Severe - line/depression along thigh, obviously thin     Posterior Calf Region Moderate - some roundness, slight firmness  unable        Fat Loss    Subcutaneous Fat Loss Findings Moderate     Orbital Region  Moderate -  somewhat hollowness, slightly dark circles     Upper Arm Region Moderate - some fat tissue, not ample     Thoracic & Lumbar Region --  mild        Criteria Met (Must meet criteria for severity in at least 2 of these categories: M Wasting, Fat Loss, Fluid, Secondary Signs, Wt. Status, Intake)    Patient meets criteria for  Moderate (non-severe) Malnutrition                 Problem/Interventions:   Problem 1     Row Name 12/08/22 1532          Nutrition Diagnoses Problem 1    Problem 1 Malnutrition  chronic moderate     Etiology (related to) Medical Diagnosis;Factors Affecting Nutrition     Neurological Dementia     Reported/Observed By Family     Appetite Fair;Poor at this Time     Oral Chewing Difficulty     Mental State/Condition Confusion;Impaired Cognitive Status/Motor Control     Signs/Symptoms (evidenced by) PO Intake;BMI  mostly moderate fat loss/muscle wasting     Percent (%) intake recorded 29 %     Over number of meals 12  los     BMI 20 - 20.9                Problem 2     Row Name 12/08/22 1534          Nutrition Diagnoses Problem 2    Problem 2 Increased Nutrient Needs     Macronutrient Kcal;Protein     Etiology (related to) Medical Diagnosis     Ortho Fracture     Skin Surgical wound     Signs/Symptoms (evidenced by) BMI     BMI 20 - 20.9                    Intervention Goal     Row Name 12/08/22 1534          Intervention Goal    General  Maintain nutrition;Meet nutritional needs for age/condition;Reduce/improve symptoms;Improved nutrition related lab(s);Provide information regarding MNT for treatment/condition     PO Increase intake;Meet estimated needs;Modify texture/consistency     Weight Maintain weight                Nutrition Intervention     Row Name 12/08/22 1535          Nutrition Intervention    RD/Tech Action Interview for preference;Menu adjusted;Encourage intake;Recommend/ordered;Follow Tx progress;Care plan reviewd     Recommended/Ordered Diet;Supplement                Nutrition Prescription     Row Name 12/08/22 1535          Nutrition Prescription PO    PO Prescription Begin/change diet;Begin/change supplement     Begin/Change Diet to Soft Texture     Texture Ground     Supplement Boost Plus;Milk     Supplement Frequency 3 times a day     New PO Prescription Ordered? Yes                Education/Evaluation     Row Name 12/08/22 1535          Education    Education Education topics;Advised regarding habits/behavior     Education Topics Basic nutrition;Protein;Weight gain strategy     Advised Regarding Habits/Behavior Food choices;Texture modification;Use supplement;Weight gain strategy;Self monitor;Snacks;Increased nutrient density        Monitor/Evaluation    Monitor PO intake;Supplement intake;Pertinent labs;Weight;Skin status;Symptoms     Education Follow-up Reinforce PRN                 Electronically signed by:  Evita Joyner RD  12/08/22 15:36 CST

## 2022-12-08 NOTE — PLAN OF CARE
Problem: Adult Inpatient Plan of Care  Goal: Plan of Care Review  12/8/2022 1543 by Evita Joyner RD  Outcome: Ongoing, Progressing  Flowsheets (Taken 12/8/2022 1543)  Plan of Care Reviewed With:   patient   spouse   Goal Outcome Evaluation:  Plan of Care Reviewed With: patient, spouse          left hip fracture, s/p surgical intervention. Hx dementia, MI. Reg diet, intakes for LOS average 29% meals. # w/ BMI 20.3. Pt slightly confused, attempted to answer some RD's ?s- wife present and assisted. Pt is edentulous, wife thinks pt would beneft from gound meats. She says his intake varies. Doesnt think he has lost wt. NFPE noted mostly moderate fat loss/muscle wasting. Pt meets criteria for moderate, chronic malnutriiton likely r/t decreased intake d/t dementia/advanced age.  Agreed to see if pt will accept Boost Plus/2% milk at meals, possibly yogurt/cottage cheese as well. Discussed ideas for increasing protein/calories and will attach info to print w/ d/c papers. RD following.

## 2022-12-08 NOTE — PROGRESS NOTES
[facility] Medicine Services  INPATIENT PROGRESS NOTE    Length of Stay: 7  Date of Admission: 12/1/2022  Primary Care Physician: Nicole Burgos APRN    Subjective   Chief Complaint: Confusion  HPI:      No changes overnight. Waiting on placement.     Review of Systems   Unable to perform ROS: Dementia      All pertinent negatives and positives are as above. All other systems have been reviewed and are negative unless otherwise stated.     Objective    As of today 12/08/22  Temp:  [97.2 °F (36.2 °C)-98.5 °F (36.9 °C)] 97.8 °F (36.6 °C)  Heart Rate:  [72-97] 82  Resp:  [16-18] 16  BP: (142-168)/(70-83) 142/74    Physical Exam  Constitutional:       General: He is not in acute distress.     Appearance: He is not toxic-appearing.   HENT:      Head: Normocephalic and atraumatic.      Right Ear: External ear normal.      Left Ear: External ear normal.      Nose: Nose normal.      Mouth/Throat:      Mouth: Mucous membranes are moist.   Eyes:      Conjunctiva/sclera: Conjunctivae normal.   Cardiovascular:      Rate and Rhythm: Normal rate and regular rhythm.      Pulses: Normal pulses.      Heart sounds: Normal heart sounds.   Pulmonary:      Effort: Pulmonary effort is normal. No respiratory distress.      Breath sounds: Normal breath sounds.   Abdominal:      General: Bowel sounds are normal.      Palpations: Abdomen is soft.      Tenderness: There is no abdominal tenderness.   Musculoskeletal:         General: No deformity.   Skin:     General: Skin is warm and dry.      Capillary Refill: Capillary refill takes less than 2 seconds.   Neurological:      General: No focal deficit present.           Results Review:  I have reviewed the labs, radiology results, and diagnostic studies.    Laboratory Data:   Results from last 7 days   Lab Units 12/05/22  2233 12/05/22  1136 12/05/22  0604 12/04/22  1411 12/03/22  0701 12/02/22  0708   SODIUM mmol/L  --   --  142 143 146* 143    POTASSIUM mmol/L 4.0 3.3* 3.3* 3.4* 3.6 3.3*   CHLORIDE mmol/L  --   --  107 108* 109* 107   CO2 mmol/L  --   --  26.0 24.0 24.0 22.0   BUN mg/dL  --   --  22 26* 24* 20   CREATININE mg/dL  --   --  1.15 1.28* 1.34* 1.15   GLUCOSE mg/dL  --   --  118* 121* 119* 156*   CALCIUM mg/dL  --   --  8.9 8.7 9.3 9.6   BILIRUBIN mg/dL  --   --   --  0.3 0.5 0.9   ALK PHOS U/L  --   --   --  80 80 93   ALT (SGPT) U/L  --   --   --  <5 9 14   AST (SGOT) U/L  --   --   --  31 29 21   ANION GAP mmol/L  --   --  9.0 11.0 13.0 14.0     Estimated Creatinine Clearance: 35.4 mL/min (by C-G formula based on SCr of 1.15 mg/dL).  Results from last 7 days   Lab Units 12/02/22  0708   MAGNESIUM mg/dL 1.5*         Results from last 7 days   Lab Units 12/05/22  0604 12/04/22  1411 12/03/22  0701 12/02/22  1318 12/02/22  0708 12/01/22  2145   WBC 10*3/mm3 7.52 8.34 9.78  --  11.06* 11.40*   HEMOGLOBIN g/dL 12.2* 12.3* 13.0 14.4 14.5 14.6   HEMATOCRIT % 35.0* 36.8* 38.2 42.6 42.3 42.0   PLATELETS 10*3/mm3 142 141 110*  --  145 150     Results from last 7 days   Lab Units 12/01/22  2338   INR  1.21*       Culture Data:   No results found for: BLOODCX  No results found for: URINECX  No results found for: RESPCX  No results found for: WOUNDCX  No results found for: STOOLCX  No components found for: BODYFLD    Radiology Data:   Imaging Results (Last 24 Hours)     ** No results found for the last 24 hours. **          I have reviewed the patient's current medications.     Assessment/Plan     Principal Problem:    Closed fracture of proximal end of left femur, initial encounter ([facility])  Active Problems:    Urinary retention  Dementia    Plan:  - Appreciate orthopedic surgery assistance  - Status post intramedullary rodding of the left hip on 12/2/2022  - Appreciate urology assistance, Godwin catheter to stay in at discharge  - Continue home medications as appropriate  - Continue working with PT and OT  - No longer receiving IV medications.  As  attempting to have another IV would likely only result in further agitation we will plan on leaving it out for now.   - DVT prophylaxis with Lovenox  - CODE STATUS: Full    Ok to d/c tele     I confirmed that the patient's Advance Care Plan is present, code status is documented, or surrogate decision maker is listed in the patient's medical record.      Discharge Planning: per case mgt, he can be discharged to SNF on Friday    Lurdes Paz MD

## 2022-12-08 NOTE — PLAN OF CARE
Problem: Adult Inpatient Plan of Care  Goal: Plan of Care Review  Outcome: Ongoing, Progressing  Flowsheets (Taken 12/8/2022 1540)  Plan of Care Reviewed With:   patient   spouse   Goal Outcome Evaluation:  Plan of Care Reviewed With: patient, spouse            left hip fracture, s/p surgical intervention. Hx dementia, MI. Reg diet, intakes for LOS average 29% meals. # w/ BMI 20.3. Pt slightly confused, attempted to answer some RD's ?s- wife present and assisted. Pt is edentulous, wife thinks pt would beneft from gound meats. She says his intake varies. Doesnt think he has lost wt. Agreed to see if pt will accept Boost Plus/2% milk at meals, possibly yogurt/cottage cheese as well. Discussed ideas for increasing protein/calories and will attach info to print w/ d/c papers. RD following.

## 2022-12-09 VITALS
SYSTOLIC BLOOD PRESSURE: 150 MMHG | HEART RATE: 90 BPM | DIASTOLIC BLOOD PRESSURE: 87 MMHG | TEMPERATURE: 96.1 F | BODY MASS INDEX: 20.33 KG/M2 | HEIGHT: 65 IN | WEIGHT: 122 LBS | RESPIRATION RATE: 16 BRPM | OXYGEN SATURATION: 98 %

## 2022-12-09 PROCEDURE — 94760 N-INVAS EAR/PLS OXIMETRY 1: CPT

## 2022-12-09 PROCEDURE — 94664 DEMO&/EVAL PT USE INHALER: CPT

## 2022-12-09 PROCEDURE — 94799 UNLISTED PULMONARY SVC/PX: CPT

## 2022-12-09 RX ORDER — ENOXAPARIN SODIUM 100 MG/ML
40 INJECTION SUBCUTANEOUS DAILY
Qty: 9.2 ML | Refills: 0 | Status: SHIPPED | OUTPATIENT
Start: 2022-12-09 | End: 2023-01-01

## 2022-12-09 RX ORDER — AMOXICILLIN 250 MG
2 CAPSULE ORAL 2 TIMES DAILY PRN
Qty: 30 TABLET | Refills: 1 | Status: SHIPPED | OUTPATIENT
Start: 2022-12-09 | End: 2023-01-31

## 2022-12-09 RX ADMIN — IPRATROPIUM BROMIDE AND ALBUTEROL SULFATE 3 ML: 2.5; .5 SOLUTION RESPIRATORY (INHALATION) at 10:53

## 2022-12-09 RX ADMIN — IPRATROPIUM BROMIDE AND ALBUTEROL SULFATE 3 ML: 2.5; .5 SOLUTION RESPIRATORY (INHALATION) at 06:59

## 2022-12-09 RX ADMIN — BUDESONIDE AND FORMOTEROL FUMARATE DIHYDRATE 2 PUFF: 80; 4.5 AEROSOL RESPIRATORY (INHALATION) at 07:05

## 2022-12-09 RX ADMIN — RIVASTIGMINE TRANSDERMAL SYSTEM 1 PATCH: 4.6 PATCH, EXTENDED RELEASE TRANSDERMAL at 09:12

## 2022-12-09 NOTE — DISCHARGE SUMMARY
[facility] Medicine Services  DISCHARGE SUMMARY       Date of Admission: 12/1/2022  Date of Discharge:  12/9/2022  Primary Care Physician: Nicole Burgos APRN    Presenting Problem/History of Present Illness:  Urinary retention [R33.9]  Fall, initial encounter [W19.XXXA]  Closed fracture of proximal end of left femur, initial encounter ([facility]) [S72.002A]       Final Discharge Diagnoses:  Active Hospital Problems    Diagnosis    • **Closed fracture of proximal end of left femur, initial encounter ([facility])    • Urinary retention        Consults:   Consults     Date and Time Order Name Status Description    12/2/2022  2:14 PM Inpatient Urology Consult Completed     12/1/2022  9:17 PM Orthopedics (on-call MD unless specified) Completed           Procedures Performed: Procedure(s):  CYSTOSCOPY                Pertinent Test Results:   Lab Results (most recent)     Procedure Component Value Units Date/Time    COVID-19 and FLU A/B PCR - Swab, Nasopharynx [[id number]]  (Normal) Collected: 12/07/22 0137    Specimen: Swab from Nasopharynx Updated: 12/07/22 0219     COVID19 Not Detected     Influenza A PCR Not Detected     Influenza B PCR Not Detected    Narrative:      Fact sheet for providers: https://www.fda.gov/media/320653/download    Fact sheet for patients: https://www.fda.gov/media/584591/download    Test performed by PCR.    Potassium [[id number]]  (Normal) Collected: 12/05/22 2233    Specimen: Blood Updated: 12/05/22 2256     Potassium 4.0 mmol/L     Potassium [[id number]]  (Abnormal) Collected: 12/05/22 1136    Specimen: Blood Updated: 12/05/22 1226     Potassium 3.3 mmol/L     Basic Metabolic Panel [[id number]]  (Abnormal) Collected: 12/05/22 0604    Specimen: Blood Updated: 12/05/22 0700     Glucose 118 mg/dL      BUN 22 mg/dL      Creatinine 1.15 mg/dL      Sodium 142 mmol/L      Potassium 3.3 mmol/L      Chloride 107 mmol/L      CO2 26.0 mmol/L      Calcium 8.9 mg/dL       BUN/Creatinine Ratio 19.1     Anion Gap 9.0 mmol/L      eGFR 61.6 mL/min/1.73      Comment: National Kidney Foundation and American Society of Nephrology (ASN) Task Force recommended calculation based on the Chronic Kidney Disease Epidemiology Collaboration (CKD-EPI) equation refit without adjustment for race.       Narrative:      GFR Normal >60  Chronic Kidney Disease <60  Kidney Failure <15    The GFR formula is only valid for adults with stable renal function between ages 18 and 70.    CBC & Differential [[id number]]  (Abnormal) Collected: 12/05/22 0604    Specimen: Blood Updated: 12/05/22 0652    Narrative:      The following orders were created for panel order CBC & Differential.  Procedure                               Abnormality         Status                     ---------                               -----------         ------                     CBC Auto Differential[[id number]]        Abnormal            Final result               Scan Slide[[id number]]                                                                    Please view results for these tests on the individual orders.    CBC Auto Differential [[id number]]  (Abnormal) Collected: 12/05/22 0604    Specimen: Blood Updated: 12/05/22 0652     WBC 7.52 10*3/mm3      RBC 3.76 10*6/mm3      Hemoglobin 12.2 g/dL      Hematocrit 35.0 %      MCV 93.1 fL      MCH 32.4 pg      MCHC 34.9 g/dL      RDW 12.3 %      RDW-SD 42.2 fl      MPV 10.7 fL      Platelets 142 10*3/mm3      Neutrophil % 61.5 %      Lymphocyte % 22.1 %      Monocyte % 10.4 %      Eosinophil % 5.2 %      Basophil % 0.5 %      Immature Grans % 0.3 %      Neutrophils, Absolute 4.63 10*3/mm3      Lymphocytes, Absolute 1.66 10*3/mm3      Monocytes, Absolute 0.78 10*3/mm3      Eosinophils, Absolute 0.39 10*3/mm3      Basophils, Absolute 0.04 10*3/mm3      Immature Grans, Absolute 0.02 10*3/mm3      nRBC 0.0 /100 WBC     Comprehensive Metabolic Panel [[id number]]  (Abnormal) Collected: 12/04/22 1411     Specimen: Blood Updated: 12/04/22 1538     Glucose 121 mg/dL      BUN 26 mg/dL      Creatinine 1.28 mg/dL      Sodium 143 mmol/L      Potassium 3.4 mmol/L      Chloride 108 mmol/L      CO2 24.0 mmol/L      Calcium 8.7 mg/dL      Total Protein 5.7 g/dL      Albumin 3.20 g/dL      ALT (SGPT) <5 U/L      AST (SGOT) 31 U/L      Alkaline Phosphatase 80 U/L      Total Bilirubin 0.3 mg/dL      Globulin 2.5 gm/dL      A/G Ratio 1.3 g/dL      BUN/Creatinine Ratio 20.3     Anion Gap 11.0 mmol/L      eGFR 54.2 mL/min/1.73      Comment: National Kidney Foundation and American Society of Nephrology (ASN) Task Force recommended calculation based on the Chronic Kidney Disease Epidemiology Collaboration (CKD-EPI) equation refit without adjustment for race.       Narrative:      GFR Normal >60  Chronic Kidney Disease <60  Kidney Failure <15    The GFR formula is only valid for adults with stable renal function between ages 18 and 70.    CBC & Differential [[id number]]  (Abnormal) Collected: 12/04/22 1411    Specimen: Blood Updated: 12/04/22 1522    Narrative:      The following orders were created for panel order CBC & Differential.  Procedure                               Abnormality         Status                     ---------                               -----------         ------                     CBC Auto Differential[[id number]]        Abnormal            Final result               Scan Slide[[id number]]                                                                    Please view results for these tests on the individual orders.    CBC Auto Differential [[id number]]  (Abnormal) Collected: 12/04/22 1411    Specimen: Blood Updated: 12/04/22 1522     WBC 8.34 10*3/mm3      RBC 3.93 10*6/mm3      Hemoglobin 12.3 g/dL      Hematocrit 36.8 %      MCV 93.6 fL      MCH 31.3 pg      MCHC 33.4 g/dL      RDW 12.2 %      RDW-SD 42.2 fl      MPV 10.9 fL      Platelets 141 10*3/mm3      Neutrophil % 70.8 %      Lymphocyte % 14.4 %       Monocyte % 11.5 %      Eosinophil % 2.5 %      Basophil % 0.4 %      Immature Grans % 0.4 %      Neutrophils, Absolute 5.91 10*3/mm3      Lymphocytes, Absolute 1.20 10*3/mm3      Monocytes, Absolute 0.96 10*3/mm3      Eosinophils, Absolute 0.21 10*3/mm3      Basophils, Absolute 0.03 10*3/mm3      Immature Grans, Absolute 0.03 10*3/mm3      nRBC 0.0 /100 WBC     Comprehensive Metabolic Panel [[id number]]  (Abnormal) Collected: 12/03/22 0701    Specimen: Blood Updated: 12/03/22 0803     Glucose 119 mg/dL      BUN 24 mg/dL      Creatinine 1.34 mg/dL      Sodium 146 mmol/L      Potassium 3.6 mmol/L      Chloride 109 mmol/L      CO2 24.0 mmol/L      Calcium 9.3 mg/dL      Total Protein 5.9 g/dL      Albumin 3.60 g/dL      ALT (SGPT) 9 U/L      AST (SGOT) 29 U/L      Alkaline Phosphatase 80 U/L      Total Bilirubin 0.5 mg/dL      Globulin 2.3 gm/dL      A/G Ratio 1.6 g/dL      BUN/Creatinine Ratio 17.9     Anion Gap 13.0 mmol/L      eGFR 51.3 mL/min/1.73      Comment: National Kidney Foundation and American Society of Nephrology (ASN) Task Force recommended calculation based on the Chronic Kidney Disease Epidemiology Collaboration (CKD-EPI) equation refit without adjustment for race.       Narrative:      GFR Normal >60  Chronic Kidney Disease <60  Kidney Failure <15    The GFR formula is only valid for adults with stable renal function between ages 18 and 70.    Hemoglobin & Hematocrit, Blood [[id number]]  (Normal) Collected: 12/02/22 1318    Specimen: Blood Updated: 12/02/22 1327     Hemoglobin 14.4 g/dL      Hematocrit 42.6 %     Vitamin D,25-Hydroxy [[id number]]  (Normal) Collected: 12/02/22 0708    Specimen: Blood Updated: 12/02/22 1313     25 Hydroxy, Vitamin D 36.4 ng/ml     Narrative:      Reference Range for Total Vitamin D 25(OH)     Deficiency <20.0 ng/mL   Insufficiency 21-29 ng/mL   Sufficiency  ng/mL  Toxicity >100 ng/ml    Results may be falsely increased if patient taking Biotin.      Folate [[id number]]   (Normal) Collected: 12/02/22 0708    Specimen: Blood Updated: 12/02/22 1313     Folate 8.81 ng/mL     Narrative:      Results may be falsely increased if patient taking Biotin.      Vitamin B12 [[id number]]  (Normal) Collected: 12/02/22 0708    Specimen: Blood Updated: 12/02/22 1313     Vitamin B-12 325 pg/mL     Narrative:      Results may be falsely increased if patient taking Biotin.      TSH [[id number]]  (Normal) Collected: 12/02/22 0708    Specimen: Blood Updated: 12/02/22 0804     TSH 2.640 uIU/mL     Magnesium [[id number]]  (Abnormal) Collected: 12/02/22 0708    Specimen: Blood Updated: 12/02/22 0759     Magnesium 1.5 mg/dL     Hemoglobin A1c [[id number]]  (Normal) Collected: 12/02/22 0708    Specimen: Blood Updated: 12/02/22 0750     Hemoglobin A1C 5.50 %     Narrative:      Hemoglobin A1C Ranges:    Increased Risk for Diabetes  5.7% to 6.4%  Diabetes                     >= 6.5%  Diabetic Goal                < 7.0%    CBC (No Diff) [[id number]]  (Abnormal) Collected: 12/02/22 0708    Specimen: Blood Updated: 12/02/22 0740     WBC 11.06 10*3/mm3      RBC 4.58 10*6/mm3      Hemoglobin 14.5 g/dL      Hematocrit 42.3 %      MCV 92.4 fL      MCH 31.7 pg      MCHC 34.3 g/dL      RDW 12.2 %      RDW-SD 41.4 fl      MPV 10.2 fL      Platelets 145 10*3/mm3     Protime-INR [[id number]]  (Abnormal) Collected: 12/01/22 2338    Specimen: Blood Updated: 12/02/22 0021     Protime 15.3 Seconds      INR 1.21    Narrative:      Therapeutic range for most indications is 2.0-3.0 INR,  or 2.5-3.5 for mechanical heart valves.    Urinalysis With Microscopic If Indicated (No Culture) - Urine, Clean Catch [[id number]]  (Abnormal) Collected: 12/01/22 2129    Specimen: Urine, Clean Catch Updated: 12/01/22 2136     Color, UA Yellow     Appearance, UA Clear     pH, UA 7.0     Specific Gravity, UA 1.015     Glucose, UA Negative     Ketones, UA Negative     Bilirubin, UA Negative     Blood, UA Negative     Protein, UA Trace     Leuk Esterase,  UA Negative     Nitrite, UA Negative     Urobilinogen, UA 0.2 E.U./dL    Narrative:      Urine microscopic not indicated.        Imaging Results (Most Recent)     Procedure Component Value Units Date/Time    CT Head Without Contrast [[id number]] Collected: 12/03/22 1153     Updated: 12/03/22 1220    Narrative:      EXAM:  CT HEAD WITHOUT IV CONTRAST    ORDERING PROVIDER:  TACOS TATUM    CLINICAL HISTORY:   Altered mental status    COMPARISON:   10/2/2022 and 10/9/2022    TECHNIQUE:   Nonenhanced CT of the head was performed and reformatted in the  sagittal and coronal planes.     This examination was performed according to our departmental dose  optimization program which includes automated exposure control,  adjustment of the MA and kV according to patient size, and/or use  of iterative reconstruction technique.    FINDINGS:     CEREBRAL PARENCHYMA:  Chronic microangiopathic changes. No  hemorrhage.  No intracranial mass or mass effect. Age-appropriate  cerebral atrophy.    POSTERIOR FOSSA:  Age-appropriate atrophy of cerebellum and  brainstem.  No cerebellar tonsillar ectopia.    EXTRA-AXIAL SPACES:  Normal size and configuration.  No mass,  fluid collection or hemorrhage.    ORBITS: No mass. Unremarkable extraocular muscles, globe and  optic nerve.    CALVARIA AND SOFT TISSUES:  No mass or adenopathy, lytic or  sclerotic lesion.    TEMPORAL BONE AND SKULL BASE:  Unremarkable middle and inner ear  , and mastoid air cells.    PARANASAL SINUSES AND FACIAL BONES: Unremarkable.     VASCULAR STRUCTURES:  Unremarkable.      Impression:      1.  No acute intracranial process.  2.  Scattered chronic microangiopathic changes.      Electronically signed by:  Panfilo Horton MD  12/3/2022 12:18 PM CST  Workstation: [id number]    US Renal Bilateral [[id number]] Collected: 12/02/22 1732     Updated: 12/02/22 1859    Narrative:      EXAM:  US RETROPERITONEUM    ORDERING PROVIDER:  MERCEDEZ KEMP    CLINICAL HISTORY:     Hematuria    COMPARISON STUDY:      TECHNIQUE:      Transverse and longitudinal sonographic and Doppler imaging of  bilateral kidneys and urinary bladder was performed.    FINDINGS:        RIGHT KIDNEY:  10.5 x 4.1 x 5.8 cm. The right renal cortex  demonstrates unremarkable contour and increased cortical  echogenicity due to chronic medical renal disease. No  nephrolithiasis. Mild-to-moderate hydronephrosis. No solid mass.    LEFT  KIDNEY:  10.8 x 3.7 x 3.5 cm.  The left renal cortex  demonstrates unremarkable contour and increased cortical  echogenicity due to chronic medical renal disease. No  nephrolithiasis. Mild-to-moderate hydronephrosis. No solid mass.    URINARY BLADDER: Decompressed with Godwin catheter in place.      Impression:      There are bilateral mild-to-moderate hydronephrosis on both  sides.  Bilateral chronic medical renal disease with increased cortical  echogenicity on both sides.  Urinary bladder is decompressed with Godwin catheter.    Electronically signed by:  Panfilo Horton MD  12/2/2022 6:57 PM CST  Workstation: [id number]    FL C Arm During Surgery [[id number]] Resulted: 12/02/22 1331     Updated: 12/02/22 1331    XR Femur 2 View Left [[id number]] Collected: 12/01/22 2156     Updated: 12/01/22 2227    Narrative:      Left femur x-ray two views on 12/1/2022    CLINICAL INDICATION: Left intertrochanteric femur fracture    COMPARISON: Left hip x-ray from 12/1/2022 and CT pelvis from  12/1/2022    FINDINGS: There is an acute, oblique, nondisplaced left  intertrochanteric femur fracture. There is diffuse osteopenia. No  other fracture is noted. Vascular calcifications are noted.  Visualized joints are well aligned.      Impression:      Acute left intertrochanteric femur fracture.    Electronically signed by:  Noel Arias  12/1/2022 10:25 PM  CST Workstation: [id number]    CT Pelvis Without Contrast [[id number]] Collected: 12/01/22 1941     Updated: 12/01/22 2053    Narrative:      EXAM:  CT PELVIS  WITHOUT IV CONTRAST    ORDERING PROVIDER:  JOSHUA NANCE    CLINICAL HISTORY:  Pain on the left side    COMPARISON STUDY:  Radiograph of the same date    TECHNIQUE:  A multislice scan was obtained of the pelvic girdle and left hip  in the axial plane without IV contrast. Reformatted images were  generated in the sagittal and coronal planes.   This exam was performed according to our departmental  dose-optimization program, which includes automated exposure  control, adjustment of the mA and/or kV according to the  patient's size and/or use of iterative reconstruction technique.     FINDINGS:  Discontinuity of the cortex of the base of the femoral neck on  axial image 61 along both the lateral and medial margin, with a  subtle lucency visualized on coronal image 35, and axial image  64, consistent with nondisplaced fracture of the left proximal  femur along the intratrochanteric region.    Buckling of the left inferior pubic ramus on axial image 66, and  along the lateral aspect of the left superior pubic ramus on  axial image 49 and this is due to fracture of indeterminate age.  Coexisting fracture deformity of the left lateral sacral ala on  axial image 29.    There is significant distention of the urinary bladder, and this  patient may benefit from catheterization of the urinary bladder  for decompression.      Impression:      Acute nondisplaced fracture of the left proximal femur along the  intratrochanteric region.  Fracture deformity of indeterminate age involving the left pubic  rami and lateral aspect of the left sacral ala.  There is significant distention of the urinary bladder, and this  patient may benefit from catheterization of the urinary bladder  for decompression.    Electronically signed by:  Panfilo Horton MD  12/1/2022 8:50 PM CST  Workstation: [id number]    XR Foot 3+ View Left [[id number]] Collected: 12/01/22 2007     Updated: 12/01/22 2038    Narrative:      XR FOOT 3 OR MORE  VIEWS    COMPARISONS: None.    ADDITIONAL PERTINENT HISTORY: Fall with pain    FINDINGS:       Osseous structures: Mild diffuse osteopenia. No bony fractures.    Joint spaces: Negative.    Surrounding soft tissues: Negative.      Impression:      1. Diffuse osteopenia.  2. No acute appearing bony abnormalities.    Electronically signed by:  Darin Michel MD  12/1/2022 8:35 PM CST  Workstation: [id number]    XR Hip With or Without Pelvis 2 - 3 View Left [[id number]] Collected: 12/01/22 1815     Updated: 12/01/22 1844    Narrative:      EXAM DESCRIPTION: XR HIP W OR WO PELVIS 2-3 VIEW LEFT 12/1/2022  6:15 PM CST    CLINICAL HISTORY:Fall, injury    COMPARISON: None.    TECHNIQUE:     3 views of the left hip.     FINDINGS:     Osteopenia.    No acute fracture or traumatic malalignment.    Postsurgical changes of the right acetabulum and superior pubic  ramus.    Surgical clips projecting over the left pelvis.    Pelvic phleboliths.       Impression:      Osteopenia. No acute fracture or traumatic malalignment.    Electronically signed by:  Meir Vu MD  12/1/2022 6:42 PM  CST Workstation: [id number]          Chief Complaint on Day of Discharge: None    Hospital Course:  The patient is a 87 y.o. male who presented to [facility] after sustaining a fall. Upon initial evaluation in the ED, he was found to have a left hip fracture. The patient was referred to the hospitalist for admission. For complete admission history details, please see H&P dated 12/1/22.  Patient underwent left hip trochanteric femoral nailing on December 2, 2022 as per Dr. Danielle without complication.  Patient's postoperative course was complicated by urinary retention.  He was seen in consultation by Dr. Giang.  Recommendations were made for cystoscopy.  Notable findings were obstructing prostate with heavily trabeculated bladder.  Mentations as per urology were to keep Godwin upon discharge and follow-up with Dr. Giang in 1 to 2  "weeks post discharge.  At this time the patient is doing well and medically stable for discharge.  Plan discharge is to skilled nursing facility with continued DVT prophylaxis.  He will follow-up with orthopedics as per their recommendations including 2-week follow-up x-ray to be sent to Dr. Salas for review.    Condition on Discharge:  Stable    Physical Exam on Discharge:  /87 (BP Location: Left arm, Patient Position: Lying)   Pulse 90   Temp 96.1 °F (35.6 °C) (Oral)   Resp 16   Ht 165.1 cm (65\")   Wt 55.3 kg (122 lb)   SpO2 98%   BMI 20.30 kg/m²   Physical Exam  Vitals and nursing note reviewed.   Constitutional:       Appearance: Normal appearance.   HENT:      Head: Normocephalic.      Mouth/Throat:      Mouth: Mucous membranes are moist.   Eyes:      Pupils: Pupils are equal, round, and reactive to light.   Cardiovascular:      Rate and Rhythm: Normal rate and regular rhythm.   Pulmonary:      Effort: Pulmonary effort is normal.      Breath sounds: Normal breath sounds.   Abdominal:      Palpations: Abdomen is soft.   Musculoskeletal:      Right lower leg: No edema.      Left lower leg: No edema.   Skin:     General: Skin is warm.   Neurological:      General: No focal deficit present.      Mental Status: He is alert.           Discharge Disposition:  Skilled Nursing Facility (DC - External)    Discharge Medications:     Discharge Medications      New Medications      Instructions Start Date   Enoxaparin Sodium 40 MG/0.4ML solution prefilled syringe syringe  Commonly known as: LOVENOX   40 mg, Subcutaneous, Daily      sennosides-docusate 8.6-50 MG per tablet  Commonly known as: PERICOLACE   2 tablets, Oral, 2 Times Daily PRN         Continue These Medications      Instructions Start Date   albuterol sulfate  (90 Base) MCG/ACT inhaler  Commonly known as: PROVENTIL HFA;VENTOLIN HFA;PROAIR HFA   2 puffs, Inhalation, Every 4 Hours PRN      aspirin 81 MG chewable tablet   81 mg, Oral, Daily    "   atorvastatin 40 MG tablet  Commonly known as: LIPITOR   40 mg, Oral, Nightly      escitalopram 5 MG tablet  Commonly known as: LEXAPRO   5 mg, Oral, Daily      fluticasone-salmeterol 115-21 MCG/ACT inhaler  Commonly known as: ADVAIR HFA   1 puff, Inhalation, 2 Times Daily - RT      multivitamin with minerals tablet tablet   1 tablet, Oral, Daily      nitroglycerin 0.4 MG SL tablet  Commonly known as: NITROSTAT   0.4 mg, Sublingual, Every 5 Minutes PRN      rivastigmine 4.6 MG/24HR patch  Commonly known as: EXELON   1 patch, Transdermal, Daily      Vitamin D (Cholecalciferol) 10 MCG (400 UNIT) tablet  Commonly known as: CHOLECALCIFEROL   400 Units, Oral, Daily             Discharge Diet:   Diet Instructions     Diet: Regular, Soft Texture; Thin Liquids, No Restrictions; Ground      Discharge Diet:  Regular  Soft Texture       Fluid Consistency: Thin Liquids, No Restrictions    Soft Options: Ground          Activity at Discharge: As tolerated        Follow-up Appointments: PCP in one week    Maria Guadalupe Rae MD    Time: greater than 30 minutes spent preparing discharge

## 2022-12-09 NOTE — PROGRESS NOTES
"Enter Query Response Below      Query Response:       Moderate malnutrition        If applicable, please update the problem list.                Patient: Teofilo Brown        : 1935  Account: [id number]           Admit Date:         How to Respond to this query:       a. Click New Note     b. Answer query within the yellow box.                c. Update the Problem List, if applicable.      If you have any questions about this query contact me at: [email or web address]    Dr. Paz:        Per Registered Dietitian Progress Note 2022:  \"86yo male assessed by RD for LOS. Pt admit w/ left hip fracture, s/p surgical intervention. Hx dementia, MI. Reg diet, intakes for LOS average 29% meals. # w/ BMI 20.3. Pt slightly confused, attempted to answer some RD's ?s- wife present and assisted. Pt is edentulous, wife thinks pt would beneft from gound meats. She says his intake varies. Doesnt think he has lost wt. NFPE noted mostly moderate fat loss/muscle wasting. Pt meets criteria for moderate, chronic malnutriiton likely r/t decreased intake d/t dementia/advanced age.  Agreed to see if pt will accept Boost Plus/2% milk at meals, possibly yogurt/cottage cheese as well. Discussed ideas for increasing protein/calories and will attach info to print w/ d/c papers. RD to follow hospital course...  Malnutrition Severity Assessment:  Malnutrition Type: Chronic Disease - Related Malnutrition  Insufficient Energy Intake Findings: Moderate  Loss of Muscle Mass Findings: Moderate  Subcutaneous Fat Loss Findings: Moderate  Patient meets criteria for: Moderate (non-severe) Malnutrition.\"    After study, can the patient's condition be specified as:    >moderate (non-severe) malnutrition  >malnutrition  >other, please specify____________  >unable to determine    By submitting this query, we are merely seeking further clarification of documentation to accurately reflect all conditions that you are monitoring, " evaluating, treating or that extend the hospitalization or utilize additional resources of care. Please utilize your independent clinical judgment when addressing the question(s) above.     This query and your response, once completed, will be entered into the legal medical record.    Sincerely,  Beth Correia  Clinical Documentation Integrity Program

## 2022-12-09 NOTE — PROGRESS NOTES
"Physicians Statement of Medical Necessity for  Ambulance Transportation    GENERAL INFORMATION     Name: Teofilo Brwon  YOB: 1935  Medicare #: [id number]  Transport Date: 12/9/2022 (Valid for round trips this date, or for scheduled repetitive trips for 60 days from the date signed below.)  Origin: [facility] Room 433 Destination:[address] [place] [address]   Is the Patient's stay covered under Medicare Part A (PPS/DRG?)Yes  Closest appropriate facility? Yes  If this a hosp-hosp transfer? No  Is this a hospice patient? No    MEDICAL NECESSITY QUESTIONAIRE    Ambulance Transportation is medically necessary only if other means of transportation are contraindicated or would be potentially harmful to the patient.  To meet this requirement, the patient must be either \"bed confined\" or suffer from a condition such that transport by means other than an ambulance is contraindicated by the patient's condition.  The following questions must be answered by the healthcare professional signing below for this form to be valid:     1) Describe the MEDICAL CONDITION (physical and/or mental) of this patient AT THE TIME OF AMBULANCE TRANSPORT that requires the patient to be transported in an ambulance, and why transport by other means is contraindicated by the patient's condition:     Patient has been diagnosed with dementia and is a poor historian. Patient is to be considered confused. Patient presented to hospital due to left hip fracture. Patient has been bed confined since the fracture and is a moderate to max assist to transfer. Patient will require a medical attendant during this ride. Patient is unable to sit in a seated position for the duration of the ride due to hip fracture.    2) Is this patient \"bed confined\" as defined below?Yes   To be \"bed confined\" the patient must satisfy all three of the following criteria:  (1) unable to get up from bed without assistance; AND (2) " unable to ambulate;  AND (3) unable to sit in a chair or wheelchair.  3) Can this patient safely be transported by car or wheelchair van (I.e., may safely sit during transport, without an attendant or monitoring?)No   4. In addition to completing questions 1-3 above, please check any of the following conditions that apply*:          *Note: supporting documentation for any boxes checked must be maintained in the patient's medical records Patient is confused, Medical attendant required and Unable to sit in a chair or wheelchair due to decubitus ulcers or other wounds      SIGNATURE OF PHYSICIAN OR OTHER AUTHORIZED HEALTHCARE PROFESSIONAL    I certify that the above information is true and correct based on my evaluation of this patient, and represent that the patient requires transport by ambulance and that other forms of transport are contraindicated.  I understand that this information will be used by the Centers for Medicare and Medicaid Services (CMS) to support the determiniation of medical necessity for ambulance services, and I represent that I have personal knowledge of the patient's condition at the time of transport.       If this box is checked, I also certify that the patient is physically or mentally incapable of signing the ambulance service's claim form and that the institution with which I am affiliated has furnished care, services or assistance to the patient.  My signature below is made on behalf of the patient pursuant to 42 .36(b)(4). In accordance with 42 .37, the specific reason(s) that the patient is physically or mentally incapable of signing the claim for is as follows:     Signature of Physician or Healthcare Professional   MARCOS Fox   Date/Time:   12/09/22  09:04 CST       (For Scheduled repetitive transport, this form is not valid for transports performed more than 60 days after this date).                                                                                                                                             --------------------------------------------------------------------------------------------  Printed Name and Credentials of Physician or Authorized Healthcare Professional     *Form must be signed by patient's attending physician for scheduled, repetitive transports,.  For non-repetitive ambulance transports, if unable to obtain the signature of the attending physician, any of the following may sign (please select below):     Physician  Clinical Nurse Specialist  Registered Nurse     Physician Assistant x Discharge Planner  Licensed Practical Nurse     Nurse Practitioner x

## 2022-12-09 NOTE — DISCHARGE PLACEMENT REQUEST
"Teofilo Brown (87 y.o. Male)     Date of Birth   1935    Social Security Number       Address   [address]  [place] [address]    Home Phone   [phone]    MRN   [id number]       Religious   None    Marital Status                               Admission Date   12/1/22    Admission Type   Emergency    Admitting Provider   Jordy Fernandez MD    Attending Provider   Jordy Fernandez MD    Department, Room/Bed   [address], 433/1       Discharge Date       Discharge Disposition   Skilled Nursing Facility (DC - External)    Discharge Destination                               Attending Provider: Jordy Fernandez MD    Allergies: Morphine, Tramadol    Isolation: None   Infection: None   Code Status: CPR    Ht: 165.1 cm (65\")   Wt: 55.3 kg (122 lb)    Admission Cmt: None   Principal Problem: Closed fracture of proximal end of left femur, initial encounter ([facility]) [S72.002A]                 Active Insurance as of 12/1/2022     Primary Coverage     Payor Plan Insurance Group Employer/Plan Group    [facility] MEDICARE REPLACEMENT [facility] MEDICARE REPLACEMENT [id number]     Payor Plan Address Payor Plan Phone Number Payor Plan Fax Number Effective Dates    [address] [address]   1/1/2022 - None Entered    [facility] [id number]       Subscriber Name Subscriber Birth Date Member ID       TEOFILO BROWN 1935 [id number]                 Emergency Contacts      (Rel.) Home Phone Work Phone Mobile Phone    BAM BROWN (Spouse) [phone] -- [phone]              "

## 2022-12-28 ENCOUNTER — PREP FOR SURGERY (OUTPATIENT)
Dept: OTHER | Facility: HOSPITAL | Age: 87
End: 2022-12-28

## 2022-12-28 DIAGNOSIS — N31.9 NEUROGENIC DYSFUNCTION OF THE URINARY BLADDER: Primary | ICD-10-CM

## 2023-01-01 ENCOUNTER — APPOINTMENT (OUTPATIENT)
Dept: GENERAL RADIOLOGY | Facility: HOSPITAL | Age: 88
DRG: 871 | End: 2023-01-01
Payer: MEDICARE

## 2023-01-01 ENCOUNTER — APPOINTMENT (OUTPATIENT)
Dept: CT IMAGING | Facility: HOSPITAL | Age: 88
DRG: 871 | End: 2023-01-01
Payer: MEDICARE

## 2023-01-01 ENCOUNTER — HOSPITAL ENCOUNTER (INPATIENT)
Facility: HOSPITAL | Age: 88
LOS: 5 days | DRG: 871 | End: 2023-05-15
Attending: STUDENT IN AN ORGANIZED HEALTH CARE EDUCATION/TRAINING PROGRAM | Admitting: INTERNAL MEDICINE
Payer: MEDICARE

## 2023-01-01 ENCOUNTER — APPOINTMENT (OUTPATIENT)
Dept: ULTRASOUND IMAGING | Facility: HOSPITAL | Age: 88
DRG: 871 | End: 2023-01-01
Payer: MEDICARE

## 2023-01-01 ENCOUNTER — APPOINTMENT (OUTPATIENT)
Dept: INTERVENTIONAL RADIOLOGY/VASCULAR | Facility: HOSPITAL | Age: 88
DRG: 871 | End: 2023-01-01
Payer: MEDICARE

## 2023-01-01 VITALS
WEIGHT: 123.68 LBS | OXYGEN SATURATION: 90 % | DIASTOLIC BLOOD PRESSURE: 47 MMHG | BODY MASS INDEX: 18.74 KG/M2 | TEMPERATURE: 100.4 F | SYSTOLIC BLOOD PRESSURE: 85 MMHG | RESPIRATION RATE: 16 BRPM | HEIGHT: 68 IN | HEART RATE: 111 BPM

## 2023-01-01 DIAGNOSIS — N17.9 SEPSIS WITH ACUTE RENAL FAILURE WITHOUT SEPTIC SHOCK, DUE TO UNSPECIFIED ORGANISM, UNSPECIFIED ACUTE RENAL FAILURE TYPE: Primary | ICD-10-CM

## 2023-01-01 DIAGNOSIS — R65.20 SEPSIS WITH ACUTE RENAL FAILURE WITHOUT SEPTIC SHOCK, DUE TO UNSPECIFIED ORGANISM, UNSPECIFIED ACUTE RENAL FAILURE TYPE: Primary | ICD-10-CM

## 2023-01-01 DIAGNOSIS — E87.0 HYPERNATREMIA: ICD-10-CM

## 2023-01-01 DIAGNOSIS — A41.9 SEPSIS WITH ACUTE RENAL FAILURE WITHOUT SEPTIC SHOCK, DUE TO UNSPECIFIED ORGANISM, UNSPECIFIED ACUTE RENAL FAILURE TYPE: Primary | ICD-10-CM

## 2023-01-01 DIAGNOSIS — J96.01 ACUTE RESPIRATORY FAILURE WITH HYPOXIA: ICD-10-CM

## 2023-01-01 LAB
ALBUMIN SERPL-MCNC: 2.3 G/DL (ref 3.5–5.2)
ALBUMIN SERPL-MCNC: 2.9 G/DL (ref 3.5–5.2)
ALBUMIN/GLOB SERPL: 0.7 G/DL
ALBUMIN/GLOB SERPL: 0.7 G/DL
ALP SERPL-CCNC: 111 U/L (ref 39–117)
ALP SERPL-CCNC: 78 U/L (ref 39–117)
ALT SERPL W P-5'-P-CCNC: 33 U/L (ref 1–41)
ALT SERPL W P-5'-P-CCNC: 44 U/L (ref 1–41)
ANION GAP SERPL CALCULATED.3IONS-SCNC: 12 MMOL/L (ref 5–15)
ANION GAP SERPL CALCULATED.3IONS-SCNC: 12 MMOL/L (ref 5–15)
ANION GAP SERPL CALCULATED.3IONS-SCNC: 15 MMOL/L (ref 5–15)
ARTERIAL PATENCY WRIST A: ABNORMAL
AST SERPL-CCNC: 32 U/L (ref 1–40)
AST SERPL-CCNC: 38 U/L (ref 1–40)
ATMOSPHERIC PRESS: 750 MMHG
BACTERIA SPEC AEROBE CULT: NORMAL
BACTERIA UR QL AUTO: ABNORMAL /HPF
BASE EXCESS BLDA CALC-SCNC: -6.4 MMOL/L (ref 0–2)
BASOPHILS # BLD AUTO: 0.03 10*3/MM3 (ref 0–0.2)
BASOPHILS NFR BLD AUTO: 0.2 % (ref 0–1.5)
BDY SITE: ABNORMAL
BILIRUB SERPL-MCNC: 0.3 MG/DL (ref 0–1.2)
BILIRUB SERPL-MCNC: <0.2 MG/DL (ref 0–1.2)
BILIRUB UR QL STRIP: NEGATIVE
BUN SERPL-MCNC: 101 MG/DL (ref 8–23)
BUN SERPL-MCNC: 101 MG/DL (ref 8–23)
BUN SERPL-MCNC: 117 MG/DL (ref 8–23)
BUN/CREAT SERPL: 49.2 (ref 7–25)
BUN/CREAT SERPL: 50 (ref 7–25)
BUN/CREAT SERPL: 50 (ref 7–25)
CALCIUM SPEC-SCNC: 8.5 MG/DL (ref 8.6–10.5)
CALCIUM SPEC-SCNC: 8.5 MG/DL (ref 8.6–10.5)
CALCIUM SPEC-SCNC: 9.9 MG/DL (ref 8.6–10.5)
CHLORIDE SERPL-SCNC: 127 MMOL/L (ref 98–107)
CHLORIDE SERPL-SCNC: 127 MMOL/L (ref 98–107)
CHLORIDE SERPL-SCNC: 130 MMOL/L (ref 98–107)
CHOLEST SERPL-MCNC: 67 MG/DL (ref 0–200)
CLARITY UR: CLEAR
CO2 SERPL-SCNC: 18 MMOL/L (ref 22–29)
CO2 SERPL-SCNC: 18 MMOL/L (ref 22–29)
CO2 SERPL-SCNC: 19 MMOL/L (ref 22–29)
COLOR UR: YELLOW
CRE SCREEN PCR: NOT DETECTED
CREAT SERPL-MCNC: 2.02 MG/DL (ref 0.76–1.27)
CREAT SERPL-MCNC: 2.02 MG/DL (ref 0.76–1.27)
CREAT SERPL-MCNC: 2.38 MG/DL (ref 0.76–1.27)
D-LACTATE SERPL-SCNC: 2 MMOL/L (ref 0.5–2)
D-LACTATE SERPL-SCNC: 2.3 MMOL/L (ref 0.5–2)
D-LACTATE SERPL-SCNC: 2.7 MMOL/L (ref 0.5–2)
D-LACTATE SERPL-SCNC: 3.4 MMOL/L (ref 0.5–2)
DEPRECATED RDW RBC AUTO: 64.3 FL (ref 37–54)
DEPRECATED RDW RBC AUTO: 64.5 FL (ref 37–54)
EGFRCR SERPLBLD CKD-EPI 2021: 25.7 ML/MIN/1.73
EGFRCR SERPLBLD CKD-EPI 2021: 31.3 ML/MIN/1.73
EGFRCR SERPLBLD CKD-EPI 2021: 31.3 ML/MIN/1.73
EOSINOPHIL # BLD AUTO: 0.1 10*3/MM3 (ref 0–0.4)
EOSINOPHIL NFR BLD AUTO: 0.8 % (ref 0.3–6.2)
ERYTHROCYTE [DISTWIDTH] IN BLOOD BY AUTOMATED COUNT: 18.7 % (ref 12.3–15.4)
ERYTHROCYTE [DISTWIDTH] IN BLOOD BY AUTOMATED COUNT: 19.2 % (ref 12.3–15.4)
FLUAV RNA RESP QL NAA+PROBE: NOT DETECTED
FLUBV RNA RESP QL NAA+PROBE: NOT DETECTED
GAS FLOW AIRWAY: 10 LPM
GLOBULIN UR ELPH-MCNC: 3.4 GM/DL
GLOBULIN UR ELPH-MCNC: 4.3 GM/DL
GLUCOSE BLDC GLUCOMTR-MCNC: 123 MG/DL (ref 70–130)
GLUCOSE BLDC GLUCOMTR-MCNC: 155 MG/DL (ref 70–130)
GLUCOSE BLDC GLUCOMTR-MCNC: 168 MG/DL (ref 70–130)
GLUCOSE BLDC GLUCOMTR-MCNC: 259 MG/DL (ref 70–130)
GLUCOSE SERPL-MCNC: 287 MG/DL (ref 65–99)
GLUCOSE SERPL-MCNC: 96 MG/DL (ref 65–99)
GLUCOSE SERPL-MCNC: 96 MG/DL (ref 65–99)
GLUCOSE UR STRIP-MCNC: NEGATIVE MG/DL
HBA1C MFR BLD: 5.9 % (ref 4.8–5.6)
HCO3 BLDA-SCNC: 18.7 MMOL/L (ref 20–26)
HCT VFR BLD AUTO: 33.2 % (ref 37.5–51)
HCT VFR BLD AUTO: 43.8 % (ref 37.5–51)
HDLC SERPL-MCNC: 20 MG/DL (ref 40–60)
HGB BLD-MCNC: 12.3 G/DL (ref 13–17.7)
HGB BLD-MCNC: 9.5 G/DL (ref 13–17.7)
HGB UR QL STRIP.AUTO: ABNORMAL
HOLD SPECIMEN: NORMAL
HYALINE CASTS UR QL AUTO: ABNORMAL /LPF
IMM GRANULOCYTES # BLD AUTO: 0.1 10*3/MM3 (ref 0–0.05)
IMM GRANULOCYTES NFR BLD AUTO: 0.8 % (ref 0–0.5)
IMP STRAIN: NOT DETECTED
INR PPP: 1.61 (ref 0.8–1.2)
KETONES UR QL STRIP: NEGATIVE
KPC STRAIN: NOT DETECTED
LDLC SERPL CALC-MCNC: 31 MG/DL (ref 0–100)
LDLC/HDLC SERPL: 1.6 {RATIO}
LEUKOCYTE ESTERASE UR QL STRIP.AUTO: ABNORMAL
LYMPHOCYTES # BLD AUTO: 2.21 10*3/MM3 (ref 0.7–3.1)
LYMPHOCYTES NFR BLD AUTO: 18.2 % (ref 19.6–45.3)
Lab: ABNORMAL
MAGNESIUM SERPL-MCNC: 2.3 MG/DL (ref 1.6–2.4)
MAGNESIUM SERPL-MCNC: 2.9 MG/DL (ref 1.6–2.4)
MCH RBC QN AUTO: 25.9 PG (ref 26.6–33)
MCH RBC QN AUTO: 27 PG (ref 26.6–33)
MCHC RBC AUTO-ENTMCNC: 28.1 G/DL (ref 31.5–35.7)
MCHC RBC AUTO-ENTMCNC: 28.6 G/DL (ref 31.5–35.7)
MCV RBC AUTO: 92.4 FL (ref 79–97)
MCV RBC AUTO: 94.3 FL (ref 79–97)
MODALITY: ABNORMAL
MONOCYTES # BLD AUTO: 0.66 10*3/MM3 (ref 0.1–0.9)
MONOCYTES NFR BLD AUTO: 5.5 % (ref 5–12)
MRSA DNA SPEC QL NAA+PROBE: POSITIVE
NDM STRAIN: NOT DETECTED
NEUTROPHILS NFR BLD AUTO: 74.5 % (ref 42.7–76)
NEUTROPHILS NFR BLD AUTO: 9.01 10*3/MM3 (ref 1.7–7)
NITRITE UR QL STRIP: NEGATIVE
NRBC BLD AUTO-RTO: 0 /100 WBC (ref 0–0.2)
OXA 48 STRAIN: NOT DETECTED
PCO2 BLDA: 34.9 MM HG (ref 35–45)
PH BLDA: 7.34 PH UNITS (ref 7.35–7.45)
PH UR STRIP.AUTO: 5.5 [PH] (ref 5–9)
PHOSPHATE SERPL-MCNC: 3.1 MG/DL (ref 2.5–4.5)
PLATELET # BLD AUTO: 183 10*3/MM3 (ref 140–450)
PLATELET # BLD AUTO: 240 10*3/MM3 (ref 140–450)
PMV BLD AUTO: 10.2 FL (ref 6–12)
PMV BLD AUTO: 11.1 FL (ref 6–12)
PO2 BLDA: 111 MM HG (ref 83–108)
POTASSIUM SERPL-SCNC: 3.9 MMOL/L (ref 3.5–5.2)
PROCALCITONIN SERPL-MCNC: 0.34 NG/ML (ref 0–0.25)
PROT SERPL-MCNC: 5.7 G/DL (ref 6–8.5)
PROT SERPL-MCNC: 7.2 G/DL (ref 6–8.5)
PROT UR QL STRIP: ABNORMAL
PROTHROMBIN TIME: 18.8 SECONDS (ref 11.1–15.3)
RBC # BLD AUTO: 3.52 10*6/MM3 (ref 4.14–5.8)
RBC # BLD AUTO: 4.74 10*6/MM3 (ref 4.14–5.8)
RBC # UR STRIP: ABNORMAL /HPF
REF LAB TEST METHOD: ABNORMAL
SAO2 % BLDCOA: 99.7 % (ref 94–99)
SARS-COV-2 RNA RESP QL NAA+PROBE: NOT DETECTED
SODIUM SERPL-SCNC: 157 MMOL/L (ref 136–145)
SODIUM SERPL-SCNC: 157 MMOL/L (ref 136–145)
SODIUM SERPL-SCNC: 164 MMOL/L (ref 136–145)
SP GR UR STRIP: 1.02 (ref 1–1.03)
SQUAMOUS #/AREA URNS HPF: ABNORMAL /HPF
T4 FREE SERPL-MCNC: 1.12 NG/DL (ref 0.93–1.7)
TRIGL SERPL-MCNC: 75 MG/DL (ref 0–150)
TSH SERPL DL<=0.05 MIU/L-ACNC: 3.36 UIU/ML (ref 0.27–4.2)
UROBILINOGEN UR QL STRIP: ABNORMAL
VENTILATOR MODE: ABNORMAL
VIM STRAIN: NOT DETECTED
VLDLC SERPL-MCNC: 16 MG/DL (ref 5–40)
WBC # UR STRIP: ABNORMAL /HPF
WBC NRBC COR # BLD: 12.11 10*3/MM3 (ref 3.4–10.8)
WBC NRBC COR # BLD: 15.33 10*3/MM3 (ref 3.4–10.8)
WHOLE BLOOD HOLD COAG: NORMAL
WHOLE BLOOD HOLD COAG: NORMAL
WHOLE BLOOD HOLD SPECIMEN: NORMAL

## 2023-01-01 PROCEDURE — 84145 PROCALCITONIN (PCT): CPT | Performed by: STUDENT IN AN ORGANIZED HEALTH CARE EDUCATION/TRAINING PROGRAM

## 2023-01-01 PROCEDURE — 82948 REAGENT STRIP/BLOOD GLUCOSE: CPT

## 2023-01-01 PROCEDURE — 83036 HEMOGLOBIN GLYCOSYLATED A1C: CPT | Performed by: INTERNAL MEDICINE

## 2023-01-01 PROCEDURE — 63710000001 INSULIN REGULAR HUMAN PER 5 UNITS: Performed by: STUDENT IN AN ORGANIZED HEALTH CARE EDUCATION/TRAINING PROGRAM

## 2023-01-01 PROCEDURE — 82803 BLOOD GASES ANY COMBINATION: CPT

## 2023-01-01 PROCEDURE — 25010000002 HYDROMORPHONE 1 MG/ML SOLUTION: Performed by: HOSPITALIST

## 2023-01-01 PROCEDURE — 74176 CT ABD & PELVIS W/O CONTRAST: CPT

## 2023-01-01 PROCEDURE — 80061 LIPID PANEL: CPT | Performed by: INTERNAL MEDICINE

## 2023-01-01 PROCEDURE — 25010000002 PIPERACILLIN SOD-TAZOBACTAM PER 1 G: Performed by: INTERNAL MEDICINE

## 2023-01-01 PROCEDURE — 87641 MR-STAPH DNA AMP PROBE: CPT | Performed by: STUDENT IN AN ORGANIZED HEALTH CARE EDUCATION/TRAINING PROGRAM

## 2023-01-01 PROCEDURE — 85610 PROTHROMBIN TIME: CPT | Performed by: INTERNAL MEDICINE

## 2023-01-01 PROCEDURE — 71045 X-RAY EXAM CHEST 1 VIEW: CPT

## 2023-01-01 PROCEDURE — 81001 URINALYSIS AUTO W/SCOPE: CPT | Performed by: STUDENT IN AN ORGANIZED HEALTH CARE EDUCATION/TRAINING PROGRAM

## 2023-01-01 PROCEDURE — 02HV33Z INSERTION OF INFUSION DEVICE INTO SUPERIOR VENA CAVA, PERCUTANEOUS APPROACH: ICD-10-PCS | Performed by: INTERNAL MEDICINE

## 2023-01-01 PROCEDURE — 25010000002 LORAZEPAM PER 2 MG: Performed by: HOSPITALIST

## 2023-01-01 PROCEDURE — 87040 BLOOD CULTURE FOR BACTERIA: CPT | Performed by: STUDENT IN AN ORGANIZED HEALTH CARE EDUCATION/TRAINING PROGRAM

## 2023-01-01 PROCEDURE — 84100 ASSAY OF PHOSPHORUS: CPT | Performed by: INTERNAL MEDICINE

## 2023-01-01 PROCEDURE — 84443 ASSAY THYROID STIM HORMONE: CPT | Performed by: INTERNAL MEDICINE

## 2023-01-01 PROCEDURE — 93005 ELECTROCARDIOGRAM TRACING: CPT | Performed by: STUDENT IN AN ORGANIZED HEALTH CARE EDUCATION/TRAINING PROGRAM

## 2023-01-01 PROCEDURE — 93005 ELECTROCARDIOGRAM TRACING: CPT

## 2023-01-01 PROCEDURE — 83605 ASSAY OF LACTIC ACID: CPT | Performed by: STUDENT IN AN ORGANIZED HEALTH CARE EDUCATION/TRAINING PROGRAM

## 2023-01-01 PROCEDURE — 63710000001 INSULIN ASPART PER 5 UNITS: Performed by: STUDENT IN AN ORGANIZED HEALTH CARE EDUCATION/TRAINING PROGRAM

## 2023-01-01 PROCEDURE — 87081 CULTURE SCREEN ONLY: CPT | Performed by: INTERNAL MEDICINE

## 2023-01-01 PROCEDURE — 25010000002 HEPARIN (PORCINE) PER 1000 UNITS: Performed by: INTERNAL MEDICINE

## 2023-01-01 PROCEDURE — 36600 WITHDRAWAL OF ARTERIAL BLOOD: CPT

## 2023-01-01 PROCEDURE — 94761 N-INVAS EAR/PLS OXIMETRY MLT: CPT

## 2023-01-01 PROCEDURE — 87636 SARSCOV2 & INF A&B AMP PRB: CPT | Performed by: STUDENT IN AN ORGANIZED HEALTH CARE EDUCATION/TRAINING PROGRAM

## 2023-01-01 PROCEDURE — 36415 COLL VENOUS BLD VENIPUNCTURE: CPT | Performed by: STUDENT IN AN ORGANIZED HEALTH CARE EDUCATION/TRAINING PROGRAM

## 2023-01-01 PROCEDURE — 84439 ASSAY OF FREE THYROXINE: CPT | Performed by: INTERNAL MEDICINE

## 2023-01-01 PROCEDURE — 25010000002 LEVOFLOXACIN PER 250 MG: Performed by: STUDENT IN AN ORGANIZED HEALTH CARE EDUCATION/TRAINING PROGRAM

## 2023-01-01 PROCEDURE — 85025 COMPLETE CBC W/AUTO DIFF WBC: CPT | Performed by: STUDENT IN AN ORGANIZED HEALTH CARE EDUCATION/TRAINING PROGRAM

## 2023-01-01 PROCEDURE — 85027 COMPLETE CBC AUTOMATED: CPT | Performed by: INTERNAL MEDICINE

## 2023-01-01 PROCEDURE — 83735 ASSAY OF MAGNESIUM: CPT | Performed by: INTERNAL MEDICINE

## 2023-01-01 PROCEDURE — 80053 COMPREHEN METABOLIC PANEL: CPT | Performed by: STUDENT IN AN ORGANIZED HEALTH CARE EDUCATION/TRAINING PROGRAM

## 2023-01-01 PROCEDURE — 83735 ASSAY OF MAGNESIUM: CPT | Performed by: STUDENT IN AN ORGANIZED HEALTH CARE EDUCATION/TRAINING PROGRAM

## 2023-01-01 PROCEDURE — 87102 FUNGUS ISOLATION CULTURE: CPT | Performed by: INTERNAL MEDICINE

## 2023-01-01 PROCEDURE — 25010000002 HALOPERIDOL LACTATE PER 5 MG: Performed by: HOSPITALIST

## 2023-01-01 PROCEDURE — 99285 EMERGENCY DEPT VISIT HI MDM: CPT

## 2023-01-01 PROCEDURE — 80053 COMPREHEN METABOLIC PANEL: CPT | Performed by: INTERNAL MEDICINE

## 2023-01-01 PROCEDURE — C1751 CATH, INF, PER/CENT/MIDLINE: HCPCS

## 2023-01-01 PROCEDURE — 94799 UNLISTED PULMONARY SVC/PX: CPT

## 2023-01-01 PROCEDURE — 87086 URINE CULTURE/COLONY COUNT: CPT | Performed by: INTERNAL MEDICINE

## 2023-01-01 RX ORDER — SODIUM CHLORIDE 9 MG/ML
40 INJECTION, SOLUTION INTRAVENOUS AS NEEDED
Status: DISCONTINUED | OUTPATIENT
Start: 2023-01-01 | End: 2023-01-01 | Stop reason: HOSPADM

## 2023-01-01 RX ORDER — NICOTINE POLACRILEX 4 MG
15 LOZENGE BUCCAL
Status: DISCONTINUED | OUTPATIENT
Start: 2023-01-01 | End: 2023-01-01 | Stop reason: SDUPTHER

## 2023-01-01 RX ORDER — GLUCAGON 1 MG/ML
1 KIT INJECTION
Status: DISCONTINUED | OUTPATIENT
Start: 2023-01-01 | End: 2023-01-01

## 2023-01-01 RX ORDER — SODIUM CHLORIDE 0.9 % (FLUSH) 0.9 %
10 SYRINGE (ML) INJECTION EVERY 12 HOURS SCHEDULED
Status: DISCONTINUED | OUTPATIENT
Start: 2023-01-01 | End: 2023-01-01 | Stop reason: HOSPADM

## 2023-01-01 RX ORDER — DEXTROSE MONOHYDRATE 50 MG/ML
250 INJECTION, SOLUTION INTRAVENOUS ONCE
Status: COMPLETED | OUTPATIENT
Start: 2023-01-01 | End: 2023-01-01

## 2023-01-01 RX ORDER — NOREPINEPHRINE BITARTRATE 0.03 MG/ML
.02-.3 INJECTION, SOLUTION INTRAVENOUS
Status: DISCONTINUED | OUTPATIENT
Start: 2023-01-01 | End: 2023-01-01

## 2023-01-01 RX ORDER — ACETAMINOPHEN 325 MG/1
650 TABLET ORAL ONCE
Status: DISCONTINUED | OUTPATIENT
Start: 2023-01-01 | End: 2023-01-01

## 2023-01-01 RX ORDER — SODIUM CHLORIDE 0.9 % (FLUSH) 0.9 %
20 SYRINGE (ML) INJECTION AS NEEDED
Status: DISCONTINUED | OUTPATIENT
Start: 2023-01-01 | End: 2023-01-01 | Stop reason: HOSPADM

## 2023-01-01 RX ORDER — INSULIN ASPART 100 [IU]/ML
0-9 INJECTION, SOLUTION INTRAVENOUS; SUBCUTANEOUS EVERY 6 HOURS
Status: DISCONTINUED | OUTPATIENT
Start: 2023-01-01 | End: 2023-01-01

## 2023-01-01 RX ORDER — LEVOFLOXACIN 5 MG/ML
500 INJECTION, SOLUTION INTRAVENOUS
Status: DISCONTINUED | OUTPATIENT
Start: 2023-01-01 | End: 2023-01-01

## 2023-01-01 RX ORDER — NICOTINE POLACRILEX 4 MG
15 LOZENGE BUCCAL
Status: DISCONTINUED | OUTPATIENT
Start: 2023-01-01 | End: 2023-01-01

## 2023-01-01 RX ORDER — INSULIN ASPART 100 [IU]/ML
0-7 INJECTION, SOLUTION INTRAVENOUS; SUBCUTANEOUS EVERY 6 HOURS
Status: DISCONTINUED | OUTPATIENT
Start: 2023-01-01 | End: 2023-01-01

## 2023-01-01 RX ORDER — HALOPERIDOL 5 MG/ML
1 INJECTION INTRAMUSCULAR EVERY 6 HOURS PRN
Status: DISCONTINUED | OUTPATIENT
Start: 2023-01-01 | End: 2023-01-01 | Stop reason: HOSPADM

## 2023-01-01 RX ORDER — HALOPERIDOL 5 MG/ML
1 INJECTION INTRAMUSCULAR EVERY 6 HOURS PRN
Status: DISCONTINUED | OUTPATIENT
Start: 2023-01-01 | End: 2023-01-01

## 2023-01-01 RX ORDER — SCOLOPAMINE TRANSDERMAL SYSTEM 1 MG/1
1 PATCH, EXTENDED RELEASE TRANSDERMAL
Status: DISCONTINUED | OUTPATIENT
Start: 2023-01-01 | End: 2023-01-01 | Stop reason: HOSPADM

## 2023-01-01 RX ORDER — DEXTROSE MONOHYDRATE 25 G/50ML
25 INJECTION, SOLUTION INTRAVENOUS
Status: DISCONTINUED | OUTPATIENT
Start: 2023-01-01 | End: 2023-01-01

## 2023-01-01 RX ORDER — ACETAMINOPHEN 650 MG/1
650 SUPPOSITORY RECTAL ONCE
Status: COMPLETED | OUTPATIENT
Start: 2023-01-01 | End: 2023-01-01

## 2023-01-01 RX ORDER — ACETAMINOPHEN 650 MG/1
650 SUPPOSITORY RECTAL EVERY 4 HOURS PRN
Status: DISCONTINUED | OUTPATIENT
Start: 2023-01-01 | End: 2023-01-01 | Stop reason: HOSPADM

## 2023-01-01 RX ORDER — SODIUM CHLORIDE 450 MG/100ML
150 INJECTION, SOLUTION INTRAVENOUS CONTINUOUS
Status: DISCONTINUED | OUTPATIENT
Start: 2023-01-01 | End: 2023-01-01

## 2023-01-01 RX ORDER — GLUCAGON 1 MG/ML
1 KIT INJECTION
Status: DISCONTINUED | OUTPATIENT
Start: 2023-01-01 | End: 2023-01-01 | Stop reason: SDUPTHER

## 2023-01-01 RX ORDER — SODIUM CHLORIDE 0.9 % (FLUSH) 0.9 %
10 SYRINGE (ML) INJECTION AS NEEDED
Status: DISCONTINUED | OUTPATIENT
Start: 2023-01-01 | End: 2023-01-01 | Stop reason: HOSPADM

## 2023-01-01 RX ORDER — HEPARIN SODIUM 5000 [USP'U]/ML
5000 INJECTION, SOLUTION INTRAVENOUS; SUBCUTANEOUS EVERY 12 HOURS SCHEDULED
Status: DISCONTINUED | OUTPATIENT
Start: 2023-01-01 | End: 2023-01-01

## 2023-01-01 RX ORDER — DEXTROSE MONOHYDRATE 25 G/50ML
25 INJECTION, SOLUTION INTRAVENOUS
Status: DISCONTINUED | OUTPATIENT
Start: 2023-01-01 | End: 2023-01-01 | Stop reason: SDUPTHER

## 2023-01-01 RX ORDER — SODIUM CHLORIDE 9 MG/ML
INJECTION, SOLUTION INTRAVENOUS
Status: COMPLETED
Start: 2023-01-01 | End: 2023-01-01

## 2023-01-01 RX ORDER — LORAZEPAM 2 MG/ML
1 INJECTION INTRAMUSCULAR EVERY 4 HOURS PRN
Status: DISCONTINUED | OUTPATIENT
Start: 2023-01-01 | End: 2023-01-01 | Stop reason: HOSPADM

## 2023-01-01 RX ADMIN — HYDROMORPHONE HYDROCHLORIDE 0.5 MG: 1 INJECTION, SOLUTION INTRAMUSCULAR; INTRAVENOUS; SUBCUTANEOUS at 22:50

## 2023-01-01 RX ADMIN — SODIUM CHLORIDE 802.5 ML: 9 INJECTION, SOLUTION INTRAVENOUS at 22:06

## 2023-01-01 RX ADMIN — HYDROMORPHONE HYDROCHLORIDE 0.5 MG: 1 INJECTION, SOLUTION INTRAMUSCULAR; INTRAVENOUS; SUBCUTANEOUS at 03:07

## 2023-01-01 RX ADMIN — Medication 10 ML: at 18:04

## 2023-01-01 RX ADMIN — Medication 10 ML: at 08:55

## 2023-01-01 RX ADMIN — Medication 10 ML: at 13:59

## 2023-01-01 RX ADMIN — LEVOFLOXACIN 500 MG: 5 INJECTION, SOLUTION INTRAVENOUS at 21:10

## 2023-01-01 RX ADMIN — LORAZEPAM 1 MG: 2 INJECTION INTRAMUSCULAR; INTRAVENOUS at 18:09

## 2023-01-01 RX ADMIN — HYDROMORPHONE HYDROCHLORIDE 0.5 MG: 1 INJECTION, SOLUTION INTRAMUSCULAR; INTRAVENOUS; SUBCUTANEOUS at 23:46

## 2023-01-01 RX ADMIN — HYDROMORPHONE HYDROCHLORIDE 0.5 MG: 1 INJECTION, SOLUTION INTRAMUSCULAR; INTRAVENOUS; SUBCUTANEOUS at 15:39

## 2023-01-01 RX ADMIN — HYDROMORPHONE HYDROCHLORIDE 0.5 MG: 1 INJECTION, SOLUTION INTRAMUSCULAR; INTRAVENOUS; SUBCUTANEOUS at 13:09

## 2023-01-01 RX ADMIN — ACETAMINOPHEN 650 MG: 650 SUPPOSITORY RECTAL at 03:40

## 2023-01-01 RX ADMIN — SODIUM CHLORIDE 150 ML/HR: 4.5 INJECTION, SOLUTION INTRAVENOUS at 07:30

## 2023-01-01 RX ADMIN — SODIUM CHLORIDE 1000 ML: 9 INJECTION, SOLUTION INTRAVENOUS at 23:58

## 2023-01-01 RX ADMIN — LORAZEPAM 1 MG: 2 INJECTION INTRAMUSCULAR; INTRAVENOUS at 03:07

## 2023-01-01 RX ADMIN — SCOLOPAMINE TRANSDERMAL SYSTEM 1 PATCH: 1 PATCH, EXTENDED RELEASE TRANSDERMAL at 13:15

## 2023-01-01 RX ADMIN — SODIUM CHLORIDE 1000 ML: 9 INJECTION, SOLUTION INTRAVENOUS at 04:30

## 2023-01-01 RX ADMIN — HYDROMORPHONE HYDROCHLORIDE 0.5 MG: 1 INJECTION, SOLUTION INTRAMUSCULAR; INTRAVENOUS; SUBCUTANEOUS at 13:16

## 2023-01-01 RX ADMIN — HEPARIN SODIUM 5000 UNITS: 5000 INJECTION INTRAVENOUS; SUBCUTANEOUS at 02:28

## 2023-01-01 RX ADMIN — HYDROMORPHONE HYDROCHLORIDE 0.5 MG: 1 INJECTION, SOLUTION INTRAMUSCULAR; INTRAVENOUS; SUBCUTANEOUS at 20:13

## 2023-01-01 RX ADMIN — HYDROMORPHONE HYDROCHLORIDE 0.5 MG: 1 INJECTION, SOLUTION INTRAMUSCULAR; INTRAVENOUS; SUBCUTANEOUS at 08:53

## 2023-01-01 RX ADMIN — Medication 10 ML: at 14:00

## 2023-01-01 RX ADMIN — Medication 10 ML: at 21:26

## 2023-01-01 RX ADMIN — HYDROMORPHONE HYDROCHLORIDE 0.5 MG: 1 INJECTION, SOLUTION INTRAMUSCULAR; INTRAVENOUS; SUBCUTANEOUS at 03:14

## 2023-01-01 RX ADMIN — Medication 10 ML: at 20:37

## 2023-01-01 RX ADMIN — LORAZEPAM 1 MG: 2 INJECTION INTRAMUSCULAR; INTRAVENOUS at 01:20

## 2023-01-01 RX ADMIN — LORAZEPAM 1 MG: 2 INJECTION INTRAMUSCULAR; INTRAVENOUS at 11:26

## 2023-01-01 RX ADMIN — HYDROMORPHONE HYDROCHLORIDE 0.5 MG: 1 INJECTION, SOLUTION INTRAMUSCULAR; INTRAVENOUS; SUBCUTANEOUS at 21:03

## 2023-01-01 RX ADMIN — LORAZEPAM 1 MG: 2 INJECTION INTRAMUSCULAR; INTRAVENOUS at 13:15

## 2023-01-01 RX ADMIN — HALOPERIDOL LACTATE 1 MG: 5 INJECTION, SOLUTION INTRAMUSCULAR at 23:46

## 2023-01-01 RX ADMIN — SODIUM CHLORIDE 150 ML/HR: 4.5 INJECTION, SOLUTION INTRAVENOUS at 23:59

## 2023-01-01 RX ADMIN — Medication 10 ML: at 08:54

## 2023-01-01 RX ADMIN — HYDROMORPHONE HYDROCHLORIDE 0.5 MG: 1 INJECTION, SOLUTION INTRAMUSCULAR; INTRAVENOUS; SUBCUTANEOUS at 01:20

## 2023-01-01 RX ADMIN — PIPERACILLIN SODIUM AND TAZOBACTAM SODIUM 3.38 G: 3; .375 INJECTION, POWDER, LYOPHILIZED, FOR SOLUTION INTRAVENOUS at 01:00

## 2023-01-01 RX ADMIN — SODIUM CHLORIDE 150 ML/HR: 4.5 INJECTION, SOLUTION INTRAVENOUS at 06:03

## 2023-01-01 RX ADMIN — HYDROMORPHONE HYDROCHLORIDE 0.5 MG: 1 INJECTION, SOLUTION INTRAMUSCULAR; INTRAVENOUS; SUBCUTANEOUS at 03:00

## 2023-01-01 RX ADMIN — HYDROMORPHONE HYDROCHLORIDE 0.5 MG: 1 INJECTION, SOLUTION INTRAMUSCULAR; INTRAVENOUS; SUBCUTANEOUS at 18:09

## 2023-01-01 RX ADMIN — ACETAMINOPHEN 650 MG: 650 SUPPOSITORY RECTAL at 20:17

## 2023-01-01 RX ADMIN — PIPERACILLIN SODIUM AND TAZOBACTAM SODIUM 3.38 G: 3; .375 INJECTION, POWDER, LYOPHILIZED, FOR SOLUTION INTRAVENOUS at 12:08

## 2023-01-01 RX ADMIN — NOREPINEPHRINE BITARTRATE 0.02 MCG/KG/MIN: 1 INJECTION, SOLUTION, CONCENTRATE INTRAVENOUS at 05:45

## 2023-01-01 RX ADMIN — HUMAN INSULIN 2 UNITS: 100 INJECTION, SOLUTION SUBCUTANEOUS at 22:09

## 2023-01-01 RX ADMIN — LORAZEPAM 1 MG: 2 INJECTION INTRAMUSCULAR; INTRAVENOUS at 03:56

## 2023-01-01 RX ADMIN — Medication 10 ML: at 10:30

## 2023-01-01 RX ADMIN — INSULIN ASPART 2 UNITS: 100 INJECTION, SOLUTION INTRAVENOUS; SUBCUTANEOUS at 01:24

## 2023-01-01 RX ADMIN — HYDROMORPHONE HYDROCHLORIDE 0.5 MG: 1 INJECTION, SOLUTION INTRAMUSCULAR; INTRAVENOUS; SUBCUTANEOUS at 11:25

## 2023-01-01 RX ADMIN — DEXTROSE MONOHYDRATE 250 ML/HR: 50 INJECTION, SOLUTION INTRAVENOUS at 20:16

## 2023-01-01 RX ADMIN — HYDROMORPHONE HYDROCHLORIDE 0.5 MG: 1 INJECTION, SOLUTION INTRAMUSCULAR; INTRAVENOUS; SUBCUTANEOUS at 03:56

## 2023-01-01 RX ADMIN — HYDROMORPHONE HYDROCHLORIDE 0.5 MG: 1 INJECTION, SOLUTION INTRAMUSCULAR; INTRAVENOUS; SUBCUTANEOUS at 20:36

## 2023-01-01 RX ADMIN — SODIUM CHLORIDE 1000 ML: 9 INJECTION, SOLUTION INTRAVENOUS at 03:43

## 2023-01-01 RX ADMIN — SCOLOPAMINE TRANSDERMAL SYSTEM 1 PATCH: 1 PATCH, EXTENDED RELEASE TRANSDERMAL at 13:40

## 2023-01-05 PROBLEM — N31.9 NEUROGENIC DYSFUNCTION OF THE URINARY BLADDER: Status: ACTIVE | Noted: 2023-01-05

## 2023-01-23 ENCOUNTER — HOME HEALTH ADMISSION (OUTPATIENT)
Dept: HOME HEALTH SERVICES | Facility: HOME HEALTHCARE | Age: 88
End: 2023-01-23
Payer: MEDICARE

## 2023-01-23 ENCOUNTER — TRANSCRIBE ORDERS (OUTPATIENT)
Dept: HOME HEALTH SERVICES | Facility: HOME HEALTHCARE | Age: 88
End: 2023-01-23
Payer: MEDICARE

## 2023-01-23 ENCOUNTER — HOME CARE VISIT (OUTPATIENT)
Dept: HOME HEALTH SERVICES | Facility: CLINIC | Age: 88
End: 2023-01-23
Payer: MEDICARE

## 2023-01-23 DIAGNOSIS — S72.002S HIP FRACTURE, LEFT, SEQUELA: Primary | ICD-10-CM

## 2023-01-23 PROCEDURE — G0151 HHCP-SERV OF PT,EA 15 MIN: HCPCS

## 2023-01-23 NOTE — Clinical Note
"Medical Necessity and focus of care: Patient referred to  PT after a fall at home with L hip fracture and underwent L hip intertrochanteric nailing.  He is now a resident of [facility].  Skilled PT is indicated to address LE weakness, decreased balance, decreased safety limiting his safe functional mobility.    Caregiver Status: Has 24 hour assistance     Patient's goal(s): \"Just go back home\"    GG Discharge Goal: Gait 50 feet with min A    Medication Issues: None noted    Environmental/ Physical issues: Hallways are crowded    Any additional needs: None noted    Based upon record review and collaboration conference, the recommended frequency for this patient is    PT-----1w4    Provider Nicole LOGAN Notified @ 1/23/2023 and agrees with POC    Please verify your eval scores: (Answer the scores that pertain to your area of focus remove the others)    - 2    - 3    "

## 2023-01-26 VITALS
HEART RATE: 74 BPM | OXYGEN SATURATION: 96 % | DIASTOLIC BLOOD PRESSURE: 78 MMHG | TEMPERATURE: 97 F | RESPIRATION RATE: 18 BRPM | SYSTOLIC BLOOD PRESSURE: 160 MMHG

## 2023-01-26 NOTE — HOME HEALTH
"REASON FOR REFERRAL: Patient admitted to  12/1/2023-12/9/2023 after a fall at home with L hip fracture.  Upon discharge, patient admitted to [facility]; caregiver reports he has not received therapy since discharge from hospital.  Patient reports he just wants to go home; caregiver reports she is no longer able to care for him at home; her goals is for him to be able to walk in the facility.  DIAGNOSIS: Weakness, abnormal gait  SURGICAL PROCEDURE: L hip intertrochanteric nailing 12/2/2023  PERTINENT MEDICAL HISTORY: Dementia, COPD, HTN, CAD, neurogenic bladder  PRIOR LEVEL OF FUNCTION: Ambulate in home with use of environmental supports  PATIENT GOAL FOR THIS EPISODE OF CARE: \"Go back home\"  HOME SOCIAL ENVIRONMENT: Lives in [facility] with 24 hour assistance"

## 2023-01-31 ENCOUNTER — HOME CARE VISIT (OUTPATIENT)
Dept: HOME HEALTH SERVICES | Facility: CLINIC | Age: 88
End: 2023-01-31
Payer: MEDICARE

## 2023-01-31 VITALS
SYSTOLIC BLOOD PRESSURE: 124 MMHG | HEART RATE: 86 BPM | RESPIRATION RATE: 18 BRPM | DIASTOLIC BLOOD PRESSURE: 76 MMHG | OXYGEN SATURATION: 96 % | TEMPERATURE: 97.7 F

## 2023-01-31 PROCEDURE — G0157 HHC PT ASSISTANT EA 15: HCPCS

## 2023-02-01 NOTE — HOME HEALTH
pt sitting in chair w/ lap tray in place and SNF staff reports pts slight increased confusion this date

## 2023-02-07 ENCOUNTER — HOME CARE VISIT (OUTPATIENT)
Dept: HOME HEALTH SERVICES | Facility: CLINIC | Age: 88
End: 2023-02-07
Payer: MEDICARE

## 2023-02-07 VITALS
DIASTOLIC BLOOD PRESSURE: 86 MMHG | TEMPERATURE: 97.5 F | HEART RATE: 88 BPM | OXYGEN SATURATION: 94 % | SYSTOLIC BLOOD PRESSURE: 156 MMHG | RESPIRATION RATE: 18 BRPM

## 2023-02-07 PROCEDURE — G0157 HHC PT ASSISTANT EA 15: HCPCS

## 2023-02-08 NOTE — HOME HEALTH
pt in chair in common are of SNF w/ table tray in place, and SNF staff reports pts spouse present earlier this date; pt presents w/ chronic confusion and was uing crude language at beginning of this Rx; signee took pt to private exs. room during this Rx to help keep pt on task

## 2023-02-13 ENCOUNTER — HOME CARE VISIT (OUTPATIENT)
Dept: HOME HEALTH SERVICES | Facility: CLINIC | Age: 88
End: 2023-02-13
Payer: MEDICARE

## 2023-02-13 PROCEDURE — G0151 HHCP-SERV OF PT,EA 15 MIN: HCPCS

## 2023-02-14 VITALS
HEART RATE: 73 BPM | SYSTOLIC BLOOD PRESSURE: 132 MMHG | RESPIRATION RATE: 18 BRPM | TEMPERATURE: 97.5 F | DIASTOLIC BLOOD PRESSURE: 63 MMHG

## 2023-02-15 NOTE — HOME HEALTH
Patient sitting in gerichair in common room visiting with granddaughter.  Facility staff reports no recent falls; staff walks with patient with assist of one.

## 2023-02-20 ENCOUNTER — ANESTHESIA (OUTPATIENT)
Dept: PERIOP | Facility: HOSPITAL | Age: 88
End: 2023-02-20
Payer: MEDICARE

## 2023-02-20 ENCOUNTER — HOSPITAL ENCOUNTER (OUTPATIENT)
Facility: HOSPITAL | Age: 88
Setting detail: OBSERVATION
LOS: 1 days | Discharge: NURSING, REHAB OR LONG-TERM CARE | End: 2023-02-22
Attending: EMERGENCY MEDICINE | Admitting: INTERNAL MEDICINE
Payer: MEDICARE

## 2023-02-20 ENCOUNTER — ANESTHESIA EVENT (OUTPATIENT)
Dept: PERIOP | Facility: HOSPITAL | Age: 88
End: 2023-02-20
Payer: MEDICARE

## 2023-02-20 DIAGNOSIS — N31.9 NEUROGENIC DYSFUNCTION OF THE URINARY BLADDER: ICD-10-CM

## 2023-02-20 DIAGNOSIS — Z74.09 IMPAIRED FUNCTIONAL MOBILITY, BALANCE, GAIT, AND ENDURANCE: ICD-10-CM

## 2023-02-20 DIAGNOSIS — R33.8 ACUTE URINARY RETENTION: Primary | ICD-10-CM

## 2023-02-20 DIAGNOSIS — Z74.09 IMPAIRED MOBILITY AND ADLS: ICD-10-CM

## 2023-02-20 DIAGNOSIS — S37.30XA INJURY OF URETHRA, INITIAL ENCOUNTER: ICD-10-CM

## 2023-02-20 DIAGNOSIS — Z78.9 IMPAIRED MOBILITY AND ADLS: ICD-10-CM

## 2023-02-20 LAB
ALBUMIN SERPL-MCNC: 3.4 G/DL (ref 3.5–5.2)
ALBUMIN/GLOB SERPL: 1.1 G/DL
ALP SERPL-CCNC: 141 U/L (ref 39–117)
ALT SERPL W P-5'-P-CCNC: 9 U/L (ref 1–41)
ANION GAP SERPL CALCULATED.3IONS-SCNC: 10 MMOL/L (ref 5–15)
AST SERPL-CCNC: 13 U/L (ref 1–40)
BASOPHILS # BLD AUTO: 0.04 10*3/MM3 (ref 0–0.2)
BASOPHILS NFR BLD AUTO: 0.3 % (ref 0–1.5)
BILIRUB SERPL-MCNC: 0.7 MG/DL (ref 0–1.2)
BUN SERPL-MCNC: 30 MG/DL (ref 8–23)
BUN/CREAT SERPL: 27.3 (ref 7–25)
CALCIUM SPEC-SCNC: 9.4 MG/DL (ref 8.6–10.5)
CHLORIDE SERPL-SCNC: 103 MMOL/L (ref 98–107)
CO2 SERPL-SCNC: 23 MMOL/L (ref 22–29)
CREAT SERPL-MCNC: 1.1 MG/DL (ref 0.76–1.27)
DEPRECATED RDW RBC AUTO: 46.2 FL (ref 37–54)
EGFRCR SERPLBLD CKD-EPI 2021: 65 ML/MIN/1.73
EOSINOPHIL # BLD AUTO: 0.01 10*3/MM3 (ref 0–0.4)
EOSINOPHIL NFR BLD AUTO: 0.1 % (ref 0.3–6.2)
ERYTHROCYTE [DISTWIDTH] IN BLOOD BY AUTOMATED COUNT: 14 % (ref 12.3–15.4)
GLOBULIN UR ELPH-MCNC: 3.1 GM/DL
GLUCOSE SERPL-MCNC: 152 MG/DL (ref 65–99)
HCT VFR BLD AUTO: 39.1 % (ref 37.5–51)
HGB BLD-MCNC: 12.6 G/DL (ref 13–17.7)
IMM GRANULOCYTES # BLD AUTO: 0.06 10*3/MM3 (ref 0–0.05)
IMM GRANULOCYTES NFR BLD AUTO: 0.5 % (ref 0–0.5)
INR PPP: 1.28 (ref 0.8–1.2)
LYMPHOCYTES # BLD AUTO: 0.57 10*3/MM3 (ref 0.7–3.1)
LYMPHOCYTES NFR BLD AUTO: 4.4 % (ref 19.6–45.3)
MCH RBC QN AUTO: 29.1 PG (ref 26.6–33)
MCHC RBC AUTO-ENTMCNC: 32.2 G/DL (ref 31.5–35.7)
MCV RBC AUTO: 90.3 FL (ref 79–97)
MONOCYTES # BLD AUTO: 0.73 10*3/MM3 (ref 0.1–0.9)
MONOCYTES NFR BLD AUTO: 5.6 % (ref 5–12)
NEUTROPHILS NFR BLD AUTO: 11.67 10*3/MM3 (ref 1.7–7)
NEUTROPHILS NFR BLD AUTO: 89.1 % (ref 42.7–76)
NRBC BLD AUTO-RTO: 0 /100 WBC (ref 0–0.2)
PLATELET # BLD AUTO: 190 10*3/MM3 (ref 140–450)
PMV BLD AUTO: 9.2 FL (ref 6–12)
POTASSIUM SERPL-SCNC: 4 MMOL/L (ref 3.5–5.2)
PROT SERPL-MCNC: 6.5 G/DL (ref 6–8.5)
PROTHROMBIN TIME: 16 SECONDS (ref 11.1–15.3)
RBC # BLD AUTO: 4.33 10*6/MM3 (ref 4.14–5.8)
SODIUM SERPL-SCNC: 136 MMOL/L (ref 136–145)
WBC NRBC COR # BLD: 13.08 10*3/MM3 (ref 3.4–10.8)

## 2023-02-20 PROCEDURE — 87186 SC STD MICRODIL/AGAR DIL: CPT | Performed by: UROLOGY

## 2023-02-20 PROCEDURE — 36415 COLL VENOUS BLD VENIPUNCTURE: CPT | Performed by: NURSE PRACTITIONER

## 2023-02-20 PROCEDURE — 87086 URINE CULTURE/COLONY COUNT: CPT | Performed by: UROLOGY

## 2023-02-20 PROCEDURE — 25010000002 PROPOFOL 200 MG/20ML EMULSION: Performed by: NURSE ANESTHETIST, CERTIFIED REGISTERED

## 2023-02-20 PROCEDURE — G0378 HOSPITAL OBSERVATION PER HR: HCPCS

## 2023-02-20 PROCEDURE — 80053 COMPREHEN METABOLIC PANEL: CPT | Performed by: NURSE PRACTITIONER

## 2023-02-20 PROCEDURE — 25010000002 CEFTRIAXONE PER 250 MG: Performed by: UROLOGY

## 2023-02-20 PROCEDURE — 87077 CULTURE AEROBIC IDENTIFY: CPT | Performed by: UROLOGY

## 2023-02-20 PROCEDURE — 85610 PROTHROMBIN TIME: CPT | Performed by: NURSE PRACTITIONER

## 2023-02-20 PROCEDURE — 99284 EMERGENCY DEPT VISIT MOD MDM: CPT

## 2023-02-20 PROCEDURE — 93005 ELECTROCARDIOGRAM TRACING: CPT | Performed by: INTERNAL MEDICINE

## 2023-02-20 PROCEDURE — 85025 COMPLETE CBC W/AUTO DIFF WBC: CPT | Performed by: NURSE PRACTITIONER

## 2023-02-20 PROCEDURE — 96372 THER/PROPH/DIAG INJ SC/IM: CPT

## 2023-02-20 PROCEDURE — 93010 ELECTROCARDIOGRAM REPORT: CPT | Performed by: INTERNAL MEDICINE

## 2023-02-20 PROCEDURE — 25010000002 MIDAZOLAM PER 1 MG: Performed by: EMERGENCY MEDICINE

## 2023-02-20 PROCEDURE — C1894 INTRO/SHEATH, NON-LASER: HCPCS | Performed by: UROLOGY

## 2023-02-20 RX ORDER — AMOXICILLIN 250 MG
2 CAPSULE ORAL 2 TIMES DAILY
Status: DISCONTINUED | OUTPATIENT
Start: 2023-02-20 | End: 2023-02-22 | Stop reason: HOSPADM

## 2023-02-20 RX ORDER — LIDOCAINE HYDROCHLORIDE 20 MG/ML
JELLY TOPICAL ONCE
Status: DISCONTINUED | OUTPATIENT
Start: 2023-02-20 | End: 2023-02-22 | Stop reason: HOSPADM

## 2023-02-20 RX ORDER — ONDANSETRON 4 MG/1
4 TABLET, FILM COATED ORAL EVERY 6 HOURS PRN
Status: DISCONTINUED | OUTPATIENT
Start: 2023-02-20 | End: 2023-02-22 | Stop reason: HOSPADM

## 2023-02-20 RX ORDER — LORAZEPAM 0.5 MG/1
0.5 TABLET ORAL NIGHTLY
Status: DISCONTINUED | OUTPATIENT
Start: 2023-02-20 | End: 2023-02-22 | Stop reason: HOSPADM

## 2023-02-20 RX ORDER — SODIUM CHLORIDE 9 MG/ML
40 INJECTION, SOLUTION INTRAVENOUS AS NEEDED
Status: DISCONTINUED | OUTPATIENT
Start: 2023-02-20 | End: 2023-02-22 | Stop reason: HOSPADM

## 2023-02-20 RX ORDER — RISPERIDONE 0.25 MG/1
0.25 TABLET ORAL 2 TIMES DAILY
COMMUNITY
Start: 2023-01-30

## 2023-02-20 RX ORDER — SODIUM CHLORIDE 0.9 % (FLUSH) 0.9 %
10 SYRINGE (ML) INJECTION EVERY 12 HOURS SCHEDULED
Status: DISCONTINUED | OUTPATIENT
Start: 2023-02-20 | End: 2023-02-22 | Stop reason: HOSPADM

## 2023-02-20 RX ORDER — BUDESONIDE AND FORMOTEROL FUMARATE DIHYDRATE 80; 4.5 UG/1; UG/1
2 AEROSOL RESPIRATORY (INHALATION)
Status: DISCONTINUED | OUTPATIENT
Start: 2023-02-20 | End: 2023-02-22 | Stop reason: HOSPADM

## 2023-02-20 RX ORDER — BISACODYL 5 MG/1
5 TABLET, DELAYED RELEASE ORAL DAILY PRN
Status: DISCONTINUED | OUTPATIENT
Start: 2023-02-20 | End: 2023-02-22 | Stop reason: HOSPADM

## 2023-02-20 RX ORDER — ACETAMINOPHEN 160 MG/5ML
650 SOLUTION ORAL EVERY 4 HOURS PRN
Status: DISCONTINUED | OUTPATIENT
Start: 2023-02-20 | End: 2023-02-20

## 2023-02-20 RX ORDER — DEXTROSE AND SODIUM CHLORIDE 5; .9 G/100ML; G/100ML
75 INJECTION, SOLUTION INTRAVENOUS CONTINUOUS
Status: DISCONTINUED | OUTPATIENT
Start: 2023-02-20 | End: 2023-02-20

## 2023-02-20 RX ORDER — ASPIRIN 81 MG/1
81 TABLET, CHEWABLE ORAL DAILY
Status: DISCONTINUED | OUTPATIENT
Start: 2023-02-21 | End: 2023-02-22 | Stop reason: HOSPADM

## 2023-02-20 RX ORDER — PROPOFOL 10 MG/ML
INJECTION, EMULSION INTRAVENOUS AS NEEDED
Status: DISCONTINUED | OUTPATIENT
Start: 2023-02-20 | End: 2023-02-20 | Stop reason: SURG

## 2023-02-20 RX ORDER — SODIUM CHLORIDE 9 MG/ML
INJECTION, SOLUTION INTRAVENOUS CONTINUOUS PRN
Status: DISCONTINUED | OUTPATIENT
Start: 2023-02-20 | End: 2023-02-20 | Stop reason: SURG

## 2023-02-20 RX ORDER — MIDAZOLAM HYDROCHLORIDE 1 MG/ML
1 INJECTION INTRAMUSCULAR; INTRAVENOUS ONCE
Status: COMPLETED | OUTPATIENT
Start: 2023-02-20 | End: 2023-02-20

## 2023-02-20 RX ORDER — POLYETHYLENE GLYCOL 3350 17 G/17G
17 POWDER, FOR SOLUTION ORAL DAILY PRN
Status: DISCONTINUED | OUTPATIENT
Start: 2023-02-20 | End: 2023-02-22 | Stop reason: HOSPADM

## 2023-02-20 RX ORDER — ALBUTEROL SULFATE 2.5 MG/3ML
2.5 SOLUTION RESPIRATORY (INHALATION) EVERY 4 HOURS PRN
Status: DISCONTINUED | OUTPATIENT
Start: 2023-02-20 | End: 2023-02-22 | Stop reason: HOSPADM

## 2023-02-20 RX ORDER — ACETAMINOPHEN 325 MG/1
650 TABLET ORAL EVERY 4 HOURS PRN
Status: DISCONTINUED | OUTPATIENT
Start: 2023-02-20 | End: 2023-02-22 | Stop reason: HOSPADM

## 2023-02-20 RX ORDER — NITROGLYCERIN 0.4 MG/1
0.4 TABLET SUBLINGUAL
Status: DISCONTINUED | OUTPATIENT
Start: 2023-02-20 | End: 2023-02-22 | Stop reason: HOSPADM

## 2023-02-20 RX ORDER — ONDANSETRON 2 MG/ML
4 INJECTION INTRAMUSCULAR; INTRAVENOUS EVERY 6 HOURS PRN
Status: DISCONTINUED | OUTPATIENT
Start: 2023-02-20 | End: 2023-02-22 | Stop reason: HOSPADM

## 2023-02-20 RX ORDER — ALUMINA, MAGNESIA, AND SIMETHICONE 2400; 2400; 240 MG/30ML; MG/30ML; MG/30ML
15 SUSPENSION ORAL EVERY 6 HOURS PRN
Status: DISCONTINUED | OUTPATIENT
Start: 2023-02-20 | End: 2023-02-22 | Stop reason: HOSPADM

## 2023-02-20 RX ORDER — ESCITALOPRAM OXALATE 5 MG/1
5 TABLET ORAL DAILY
Status: DISCONTINUED | OUTPATIENT
Start: 2023-02-20 | End: 2023-02-22 | Stop reason: HOSPADM

## 2023-02-20 RX ORDER — FLUTICASONE PROPIONATE AND SALMETEROL 100; 50 UG/1; UG/1
POWDER RESPIRATORY (INHALATION)
COMMUNITY

## 2023-02-20 RX ORDER — RIVASTIGMINE 4.6 MG/24H
1 PATCH, EXTENDED RELEASE TRANSDERMAL DAILY
Status: DISCONTINUED | OUTPATIENT
Start: 2023-02-21 | End: 2023-02-22 | Stop reason: HOSPADM

## 2023-02-20 RX ORDER — BISACODYL 10 MG
10 SUPPOSITORY, RECTAL RECTAL DAILY PRN
Status: DISCONTINUED | OUTPATIENT
Start: 2023-02-20 | End: 2023-02-22 | Stop reason: HOSPADM

## 2023-02-20 RX ORDER — ACETAMINOPHEN 650 MG/1
650 SUPPOSITORY RECTAL EVERY 4 HOURS PRN
Status: DISCONTINUED | OUTPATIENT
Start: 2023-02-20 | End: 2023-02-22 | Stop reason: HOSPADM

## 2023-02-20 RX ORDER — SODIUM CHLORIDE 0.9 % (FLUSH) 0.9 %
10 SYRINGE (ML) INJECTION AS NEEDED
Status: DISCONTINUED | OUTPATIENT
Start: 2023-02-20 | End: 2023-02-22 | Stop reason: HOSPADM

## 2023-02-20 RX ORDER — ATORVASTATIN CALCIUM 40 MG/1
40 TABLET, FILM COATED ORAL NIGHTLY
Status: DISCONTINUED | OUTPATIENT
Start: 2023-02-20 | End: 2023-02-22 | Stop reason: HOSPADM

## 2023-02-20 RX ADMIN — PROPOFOL 30 MG: 10 INJECTION, EMULSION INTRAVENOUS at 14:45

## 2023-02-20 RX ADMIN — PROPOFOL 30 MG: 10 INJECTION, EMULSION INTRAVENOUS at 14:53

## 2023-02-20 RX ADMIN — PROPOFOL 20 MG: 10 INJECTION, EMULSION INTRAVENOUS at 14:48

## 2023-02-20 RX ADMIN — MIDAZOLAM 1 MG: 1 INJECTION, SOLUTION INTRAMUSCULAR; INTRAVENOUS at 07:15

## 2023-02-20 RX ADMIN — Medication 10 ML: at 21:00

## 2023-02-20 RX ADMIN — PROPOFOL 20 MG: 10 INJECTION, EMULSION INTRAVENOUS at 14:39

## 2023-02-20 RX ADMIN — PROPOFOL 20 MG: 10 INJECTION, EMULSION INTRAVENOUS at 14:51

## 2023-02-20 RX ADMIN — SODIUM CHLORIDE: 9 INJECTION, SOLUTION INTRAVENOUS at 14:32

## 2023-02-20 RX ADMIN — PROPOFOL 30 MG: 10 INJECTION, EMULSION INTRAVENOUS at 14:37

## 2023-02-20 RX ADMIN — PROPOFOL 30 MG: 10 INJECTION, EMULSION INTRAVENOUS at 14:42

## 2023-02-20 NOTE — ANESTHESIA PREPROCEDURE EVALUATION
Anesthesia Evaluation     Patient summary reviewed and Nursing notes reviewed   no history of anesthetic complications:  NPO Solid Status: > 8 hours  NPO Liquid Status: > 8 hours           Airway   Mallampati: II  TM distance: >3 FB  Neck ROM: full  possible difficult intubation  Comment: Final Airway Details  Final airway type: supraglottic airway        Successful airway: I-gel  Size 4     Cormack-Lehane Classification: grade I - full view of glottis  Number of attempts at approach: 1  Assessment: lips, teeth, and gum same as pre-op  Dental    (+) edentulous    Pulmonary     breath sounds clear to auscultation  (+) a smoker Former, COPD moderate, decreased breath sounds,   (-) shortness of breath  Cardiovascular     ECG reviewed  Rhythm: regular  Rate: normal    (+) hypertension, past MI  >12 months, CAD, hyperlipidemia,   (-) angina, murmur, cardiac stents, CABG    ROS comment: Normal sinus rhythm with sinus arrhythmia  Right superior axis deviation  Nonspecific intraventricular block  Abnormal ECG  When compared with ECG of 11-OCT-2022 14:36,  Questionable change in QRS axis  T wave inversion now evident in Inferior leads  T wave inversion less evident in Lateral leads•  Left ventricular ejection fraction appears to be 41 - 45%.  •  Left ventricular diastolic function is consistent with (grade I) impaired relaxation.      Neuro/Psych  (+) psychiatric history Depression, dementia, poor historian.,    GI/Hepatic/Renal/Endo - negative ROS     Musculoskeletal         ROS comment: Patient confused. Mechanical fall.   Abdominal    Substance History - negative use     OB/GYN negative ob/gyn ROS         Other   arthritis,                        Anesthesia Plan    ASA 4 - emergent     general and MAC   total IV anesthesia  (Discussed anesthesia plan with wife Gita @ 1030 emergency room 5, who understands possible anesthesia  complications, risks and agrees. )  intravenous induction     Anesthetic plan, risks,  benefits, and alternatives have been provided, discussed and informed consent has been obtained with: spouse/significant other, legal guardian and healthcare power of .  Pre-procedure education provided  Use of blood products discussed with spouse/significant other  Consented to blood products.   Plan discussed with CRNA.        CODE STATUS:

## 2023-02-20 NOTE — ANESTHESIA POSTPROCEDURE EVALUATION
Patient: Teofilo Scruggs Stone    Procedure Summary     Date: 02/20/23 Room / Location: [facility] OR 05 /  [facility] OR    Anesthesia Start: 1432 Anesthesia Stop: 1514    Procedure: CYSTOSCOPY SUPRAPUBIC CATHETER PLACEMENT           ([facility]) (Urethra) Diagnosis:       Neurogenic dysfunction of the urinary bladder      (Neurogenic dysfunction of the urinary bladder [N31.9])    Surgeons: Juan Giang MD Provider: Cam Cole CRNA    Anesthesia Type: general, MAC ASA Status: 4 - Emergent          Anesthesia Type: general, MAC    Vitals  No vitals data found for the desired time range.          Post Anesthesia Care and Evaluation    Patient location during evaluation: bedside  Patient participation: complete - patient participated  Level of consciousness: awake and awake and alert  Pain score: 2  Pain management: adequate    Airway patency: patent  Anesthetic complications: No anesthetic complications  PONV Status: none  Cardiovascular status: acceptable and stable  Respiratory status: acceptable, room air and spontaneous ventilation  Hydration status: acceptable    Comments: BP:  118/77  HR:  89  SAT:  92  RR:  18  TEMP: 97.6

## 2023-02-21 LAB
ANION GAP SERPL CALCULATED.3IONS-SCNC: 9 MMOL/L (ref 5–15)
BUN SERPL-MCNC: 31 MG/DL (ref 8–23)
BUN/CREAT SERPL: 25.8 (ref 7–25)
CALCIUM SPEC-SCNC: 9.5 MG/DL (ref 8.6–10.5)
CHLORIDE SERPL-SCNC: 107 MMOL/L (ref 98–107)
CO2 SERPL-SCNC: 24 MMOL/L (ref 22–29)
CREAT SERPL-MCNC: 1.2 MG/DL (ref 0.76–1.27)
DEPRECATED RDW RBC AUTO: 47.1 FL (ref 37–54)
EGFRCR SERPLBLD CKD-EPI 2021: 58.5 ML/MIN/1.73
ERYTHROCYTE [DISTWIDTH] IN BLOOD BY AUTOMATED COUNT: 14.3 % (ref 12.3–15.4)
GLUCOSE SERPL-MCNC: 129 MG/DL (ref 65–99)
HCT VFR BLD AUTO: 38.6 % (ref 37.5–51)
HGB BLD-MCNC: 12.6 G/DL (ref 13–17.7)
MCH RBC QN AUTO: 29.5 PG (ref 26.6–33)
MCHC RBC AUTO-ENTMCNC: 32.6 G/DL (ref 31.5–35.7)
MCV RBC AUTO: 90.4 FL (ref 79–97)
PLATELET # BLD AUTO: 176 10*3/MM3 (ref 140–450)
PMV BLD AUTO: 9.3 FL (ref 6–12)
POTASSIUM SERPL-SCNC: 4.2 MMOL/L (ref 3.5–5.2)
RBC # BLD AUTO: 4.27 10*6/MM3 (ref 4.14–5.8)
SODIUM SERPL-SCNC: 140 MMOL/L (ref 136–145)
WBC NRBC COR # BLD: 11.29 10*3/MM3 (ref 3.4–10.8)

## 2023-02-21 PROCEDURE — G0378 HOSPITAL OBSERVATION PER HR: HCPCS

## 2023-02-21 PROCEDURE — 97166 OT EVAL MOD COMPLEX 45 MIN: CPT

## 2023-02-21 PROCEDURE — 94640 AIRWAY INHALATION TREATMENT: CPT

## 2023-02-21 PROCEDURE — 94799 UNLISTED PULMONARY SVC/PX: CPT

## 2023-02-21 PROCEDURE — 94760 N-INVAS EAR/PLS OXIMETRY 1: CPT

## 2023-02-21 PROCEDURE — 80048 BASIC METABOLIC PNL TOTAL CA: CPT | Performed by: NURSE PRACTITIONER

## 2023-02-21 PROCEDURE — 85027 COMPLETE CBC AUTOMATED: CPT | Performed by: NURSE PRACTITIONER

## 2023-02-21 PROCEDURE — 97162 PT EVAL MOD COMPLEX 30 MIN: CPT

## 2023-02-21 RX ADMIN — ATORVASTATIN CALCIUM 40 MG: 40 TABLET, FILM COATED ORAL at 20:26

## 2023-02-21 RX ADMIN — RIVASTIGMINE TRANSDERMAL SYSTEM 1 PATCH: 4.6 PATCH, EXTENDED RELEASE TRANSDERMAL at 08:25

## 2023-02-21 RX ADMIN — DOCUSATE SODIUM 50 MG AND SENNOSIDES 8.6 MG 2 TABLET: 8.6; 5 TABLET, FILM COATED ORAL at 08:24

## 2023-02-21 RX ADMIN — ESCITALOPRAM 5 MG: 5 TABLET, FILM COATED ORAL at 08:24

## 2023-02-21 RX ADMIN — DOCUSATE SODIUM 50 MG AND SENNOSIDES 8.6 MG 2 TABLET: 8.6; 5 TABLET, FILM COATED ORAL at 20:26

## 2023-02-21 RX ADMIN — LORAZEPAM 0.5 MG: 0.5 TABLET ORAL at 20:26

## 2023-02-21 RX ADMIN — Medication 10 ML: at 08:28

## 2023-02-21 RX ADMIN — Medication 10 ML: at 20:26

## 2023-02-21 RX ADMIN — ASPIRIN 81 MG: 81 TABLET, CHEWABLE ORAL at 08:24

## 2023-02-22 ENCOUNTER — TRANSCRIBE ORDERS (OUTPATIENT)
Dept: HOME HEALTH SERVICES | Facility: HOME HEALTHCARE | Age: 88
End: 2023-02-22
Payer: MEDICARE

## 2023-02-22 ENCOUNTER — HOME HEALTH ADMISSION (OUTPATIENT)
Dept: HOME HEALTH SERVICES | Facility: HOME HEALTHCARE | Age: 88
End: 2023-02-22
Payer: MEDICARE

## 2023-02-22 VITALS
TEMPERATURE: 97.8 F | RESPIRATION RATE: 18 BRPM | HEIGHT: 65 IN | HEART RATE: 86 BPM | DIASTOLIC BLOOD PRESSURE: 56 MMHG | OXYGEN SATURATION: 93 % | SYSTOLIC BLOOD PRESSURE: 101 MMHG | BODY MASS INDEX: 21.33 KG/M2 | WEIGHT: 128 LBS

## 2023-02-22 DIAGNOSIS — N31.9 NEUROGENIC DYSFUNCTION OF THE URINARY BLADDER: Primary | ICD-10-CM

## 2023-02-22 LAB
BACTERIA SPEC AEROBE CULT: ABNORMAL
SARS-COV-2 RNA RESP QL NAA+PROBE: NOT DETECTED

## 2023-02-22 PROCEDURE — G0378 HOSPITAL OBSERVATION PER HR: HCPCS

## 2023-02-22 PROCEDURE — 97530 THERAPEUTIC ACTIVITIES: CPT

## 2023-02-22 PROCEDURE — 97110 THERAPEUTIC EXERCISES: CPT

## 2023-02-22 PROCEDURE — 87635 SARS-COV-2 COVID-19 AMP PRB: CPT | Performed by: INTERNAL MEDICINE

## 2023-02-22 PROCEDURE — 97116 GAIT TRAINING THERAPY: CPT

## 2023-02-22 RX ORDER — CEFDINIR 300 MG/1
300 CAPSULE ORAL EVERY 12 HOURS SCHEDULED
Status: DISCONTINUED | OUTPATIENT
Start: 2023-02-22 | End: 2023-02-22 | Stop reason: HOSPADM

## 2023-02-22 RX ORDER — CEFDINIR 300 MG/1
300 CAPSULE ORAL EVERY 12 HOURS SCHEDULED
Qty: 10 CAPSULE | Refills: 0 | Status: SHIPPED | OUTPATIENT
Start: 2023-02-22 | End: 2023-02-27

## 2023-02-22 RX ADMIN — Medication 10 ML: at 08:16

## 2023-02-22 RX ADMIN — ESCITALOPRAM 5 MG: 5 TABLET, FILM COATED ORAL at 08:17

## 2023-02-22 RX ADMIN — DOCUSATE SODIUM 50 MG AND SENNOSIDES 8.6 MG 2 TABLET: 8.6; 5 TABLET, FILM COATED ORAL at 08:17

## 2023-02-22 RX ADMIN — ASPIRIN 81 MG: 81 TABLET, CHEWABLE ORAL at 08:17

## 2023-02-22 RX ADMIN — RIVASTIGMINE TRANSDERMAL SYSTEM 1 PATCH: 4.6 PATCH, EXTENDED RELEASE TRANSDERMAL at 08:19

## 2023-02-24 ENCOUNTER — HOME CARE VISIT (OUTPATIENT)
Dept: HOME HEALTH SERVICES | Facility: CLINIC | Age: 88
End: 2023-02-24
Payer: MEDICARE

## 2023-02-24 PROCEDURE — G0151 HHCP-SERV OF PT,EA 15 MIN: HCPCS

## 2023-02-27 VITALS
SYSTOLIC BLOOD PRESSURE: 124 MMHG | RESPIRATION RATE: 18 BRPM | DIASTOLIC BLOOD PRESSURE: 68 MMHG | HEART RATE: 72 BPM | OXYGEN SATURATION: 99 % | TEMPERATURE: 97.1 F

## 2023-02-28 LAB
QT INTERVAL: 368 MS
QTC INTERVAL: 472 MS

## 2023-03-02 ENCOUNTER — HOME CARE VISIT (OUTPATIENT)
Dept: HOME HEALTH SERVICES | Facility: CLINIC | Age: 88
End: 2023-03-02
Payer: MEDICARE

## 2023-03-09 ENCOUNTER — HOME CARE VISIT (OUTPATIENT)
Dept: HOME HEALTH SERVICES | Facility: CLINIC | Age: 88
End: 2023-03-09
Payer: MEDICARE

## 2023-03-16 ENCOUNTER — HOME CARE VISIT (OUTPATIENT)
Dept: HOME HEALTH SERVICES | Facility: CLINIC | Age: 88
End: 2023-03-16
Payer: MEDICARE

## 2023-03-17 ENCOUNTER — HOME CARE VISIT (OUTPATIENT)
Dept: HOME HEALTH SERVICES | Facility: HOME HEALTHCARE | Age: 88
End: 2023-03-17
Payer: MEDICARE

## 2023-05-10 PROBLEM — R65.20 SEPSIS WITH ACUTE RENAL FAILURE WITHOUT SEPTIC SHOCK, DUE TO UNSPECIFIED ORGANISM, UNSPECIFIED ACUTE RENAL FAILURE TYPE: Status: ACTIVE | Noted: 2023-01-01

## 2023-05-10 PROBLEM — A41.9 SEPSIS WITH ACUTE RENAL FAILURE WITHOUT SEPTIC SHOCK, DUE TO UNSPECIFIED ORGANISM, UNSPECIFIED ACUTE RENAL FAILURE TYPE: Status: ACTIVE | Noted: 2023-01-01

## 2023-05-10 PROBLEM — N17.9 SEPSIS WITH ACUTE RENAL FAILURE WITHOUT SEPTIC SHOCK, DUE TO UNSPECIFIED ORGANISM, UNSPECIFIED ACUTE RENAL FAILURE TYPE: Status: ACTIVE | Noted: 2023-01-01

## 2023-05-10 NOTE — ED NOTES
Per EMS pt from [facility] in [place]. 80s O2 stat upon arrival to nursing home placed on NRB and improved to 90s, states pt has several pressure wounds and believes pt may also be septic

## 2023-05-10 NOTE — Clinical Note
Level of Care: Telemetry [5]   Diagnosis: Sepsis with acute renal failure without septic shock, due to unspecified organism, unspecified acute renal failure type [[id number]]   Admitting Physician: BEHROOZI, SAEID [[id number]]   Attending Physician: BEHROOZI, SAEID [[id number]]   Certification: I Certify That Inpatient Hospital Services Are Medically Necessary For Greater Than 2 Midnights

## 2023-05-11 PROBLEM — E43 SEVERE MALNUTRITION: Status: ACTIVE | Noted: 2023-05-11

## 2023-05-11 NOTE — PROGRESS NOTES
[facility]  INPATIENT PROGRESS NOTE    Length of Stay: 1  Date of Admission: 5/10/2023  Primary Care Physician: Radha Abraham DO    Subjective   Chief Complaint: Shortness of breath  HPI: Patient is essentially unresponsive    As of today, 05/11/23  Review of Systems   Unable to perform ROS: Patient unresponsive        All pertinent negatives and positives are as above. All other systems have been reviewed and are negative unless otherwise stated.    Objective    Temp:  [97.8 °F (36.6 °C)-100.8 °F (38.2 °C)] 97.9 °F (36.6 °C)  Heart Rate:  [] 90  Resp:  [15-34] 24  BP: ()/(38-74) 103/55         As of today, 05/11/23  Physical Exam  Vitals reviewed.   Constitutional:       Appearance: He is well-developed.   HENT:      Head: Normocephalic and atraumatic.   Eyes:      Pupils: Pupils are equal, round, and reactive to light.   Cardiovascular:      Rate and Rhythm: Normal rate and regular rhythm.      Heart sounds: Normal heart sounds. No murmur heard.    No friction rub. No gallop.   Pulmonary:      Effort: Pulmonary effort is normal. No respiratory distress.      Breath sounds: No wheezing or rales.      Comments: Coarse breath sounds  Chest:      Chest wall: No tenderness.   Abdominal:      General: Bowel sounds are normal. There is no distension.      Palpations: Abdomen is soft.      Tenderness: There is no abdominal tenderness.   Musculoskeletal:      Cervical back: Normal range of motion and neck supple.   Neurological:      Comments: Minimally responsive           Results Review:  I have reviewed the labs, radiology results, and diagnostic studies.    Laboratory Data:   Results from last 7 days   Lab Units 05/11/23  0427 05/10/23  1908   SODIUM mmol/L 157*  157* 164*   POTASSIUM mmol/L 3.9  3.9 3.9   CHLORIDE mmol/L 127*  127* 130*   CO2 mmol/L 18.0*  18.0* 19.0*   BUN mg/dL 101*  101* 117*   CREATININE mg/dL 2.02*  2.02*  2.38*   GLUCOSE mg/dL 96  96 287*   CALCIUM mg/dL 8.5*  8.5* 9.9   BILIRUBIN mg/dL 0.3 <0.2   ALK PHOS U/L 78 111   ALT (SGPT) U/L 33 44*   AST (SGOT) U/L 32 38   ANION GAP mmol/L 12.0  12.0 15.0     Estimated Creatinine Clearance: 20.4 mL/min (A) (by C-G formula based on SCr of 2.02 mg/dL (H)).  Results from last 7 days   Lab Units 05/11/23  0427 05/10/23  1921   MAGNESIUM mg/dL 2.3 2.9*   PHOSPHORUS mg/dL 3.1  --          Results from last 7 days   Lab Units 05/11/23  0427 05/10/23  1908   WBC 10*3/mm3 15.33* 12.11*   HEMOGLOBIN g/dL 9.5* 12.3*   HEMATOCRIT % 33.2* 43.8   PLATELETS 10*3/mm3 183 240     Results from last 7 days   Lab Units 05/11/23 0423   INR  1.61*       Culture Data:   No results found for: BLOODCX  No results found for: URINECX  No results found for: RESPCX  No results found for: WOUNDCX  No results found for: STOOLCX  No components found for: BODYFLD    Radiology Data:   Imaging Results (Last 24 Hours)     Procedure Component Value Units Date/Time    XR Chest Post CVA Port [[id number]] Collected: 05/11/23 0905     Updated: 05/11/23 1023    Narrative:      HISTORY:  PICC.    COMPARISON:  5/10/2023.    FINDINGS:  An AP view of the chest demonstrates right-sided PICC with tip in the SVC. There  is no focal consolidation. Heart size is normal.      Impression:      Right-sided PICC appears in good position.    IR PICC W Imaging Guidance [[id number]] Resulted: 05/11/23 0908     Updated: 05/11/23 0908    Narrative:      This procedure was auto-finalized with no dictation required.    US Guidance PICC NC [[id number]] Resulted: 05/11/23 0858     Updated: 05/11/23 0858    Narrative:      This procedure was auto-finalized with no dictation required.    CT Abdomen Pelvis Without Contrast [[id number]] Collected: 05/10/23 2020     Updated: 05/10/23 2323    Narrative:      HISTORY:  Abdominal pain and fever.    COMPARISON:  None.    TECHNIQUE:  Helical imaging from the lung bases through the pubic symphysis  without  administration of intravenous Omnipaque 350.  Axial, coronal and sagittal  reconstructed 2-D images were provided.  Oral contrast was not administered.    FINDINGS:  Significant motion artifact.    Elevation of the right hemidiaphragm.  Moderate hiatal hernia.    Given the limitations of a noncontrast examination, the liver, pancreas, spleen,  and adrenal glands are unremarkable.    Bilateral renal cortical thinning.  No hydronephrosis.    Moderate to large amount of fecal material in the rectosigmoid vault.  A  suprapubic bladder catheter is in place.    Reconstruction plate along the right superior pubic ramus.  Left hip screw is  present.  No obvious acute fracture.      Impression:      No acute intra-abdominal process.        XR Chest 1 View [[id number]] Collected: 05/10/23 1946     Updated: 05/10/23 1954    Narrative:      AP CHEST    HISTORY:  Respiratory distress.    COMPARISON:  10/2/2022    FINDINGS:  Chronic elevation of the right hemidiaphragm with associated right basilar  volume loss. Left lung appears clear.    The heart is normal in size.  Mediastinal contour is within normal limits.  A  large hiatal hernia projects over the heart.    Old right clavicle fracture.            I have utilized all available immediate resources to obtain, update, or review the patient's current medications (including all prescriptions, over-the-counter products, herbals, cannabis/cannabidiol products, and vitamin/mineral/dietary (nutritional) supplements).       Assessment/Plan     Active Hospital Problems    Diagnosis    • **Sepsis with acute renal failure without septic shock, due to unspecified organism, unspecified acute renal failure type    • Severe Malnutrition (HCC)        Plan:  1.  Sepsis with septic shock  2.  Severe hypernatremia  3.  Acute renal failure  4.  Metabolic acidosis  5.  Severe protein calorie malnutrition  6.  Decubitus ulcers present on admission    After lengthy discussion with patient's  wife, son, and granddaughter decision was made to transition to comfort measures.    Medical Decision Making  Number and Complexity of problems: Multiple highly complex medical problems.    Conditions and Status:        Condition is worsening.     [facility] Data  External documents reviewed: Outpatient notes  My EKG interpretation: Sinus tachycardia, left axis deviation, left bundle branch block  My plain film interpretation: Elevated right hemidiaphragm, no acute cardiopulmonary disease     Discussed with: Family     Treatment Plan  As above    Care Planning  Shared decision making: Family agreement with plan of care  Code status and discussions: Comfort measures    Disposition  Social Determinants of Health that impact treatment or disposition: Dementia  If patient survives the next 24 to 48 hours, will consider transfer back to skilled facility.      The patient was evaluated during the global COVID-19 pandemic, and the diagnosis was suspected/considered upon their initial presentation.  Evaluation, treatment, and testing were consistent with current guidelines for patients who present with complaints or symptoms that may be related to COVID-19.    I confirmed that the patient's Advance Care Plan is present, code status is documented, or surrogate decision maker is listed in the patient's medical record.        This document has been electronically signed by Daniele Pino MD on May 11, 2023 16:37 CDT

## 2023-05-11 NOTE — ED PROVIDER NOTES
Subjective   History of Present Illness  Patient presents from nursing home after several days of running a temperature and now having desaturations while at the nursing home.  Patient's wife said he was more alert today but for the past couple of days, since Sunday, he has been running a low-grade temperature up to 100.1.  She noticed some rattling in his chest this morning and over the past couple of days they have been having to feed him with a syringe liquid diet through his mouth.  Today, she thought he was more alert than he has been in the past couple days.  He is not on any oxygen at the nursing home and had his suprapubic cath placed February 2023.        Review of Systems  Unable to obtain review of systems as patient is not very verbal.  Past Medical History:   Diagnosis Date   • Coronary artery disease    • Dementia    • Depression    • Hip fracture     left   • Hx of fall    • Hyperlipidemia    • Myocardial infarction        Allergies   Allergen Reactions   • Aleve [Naproxen] Unknown - High Severity   • Cefuroxime Hallucinations   • Corticosteroids Other (See Comments)     unknown   • Morphine Other (See Comments)     unknown   • Tramadol Other (See Comments)     unknown       Past Surgical History:   Procedure Laterality Date   • CAROTID STENT     • CYSTOSCOPY N/A 2/20/2023    Procedure: CYSTOSCOPY SUPRAPUBIC CATHETER PLACEMENT, JAIN CATHETER PLACEMENT         ([facility]);  Surgeon: Juan Giang MD;  Location: [facility];  Service: Urology;  Laterality: N/A;   • CYSTOSCOPY, RETROGRADE PYELOGRAM AND STENT INSERTION Bilateral 12/4/2022    Procedure: CYSTOSCOPY;  Surgeon: Juan Giang MD;  Location: [facility] OR;  Service: Urology;  Laterality: Bilateral;   • HIP TROCHANTERIC NAILING WITH INTRAMEDULLARY HIP SCREW Left 12/2/2022    Procedure: HIP TROCHANTERIC NAILING SHORT WITH INTRAMEDULLARY HIP SCREW;  Surgeon: Dino aDnielle MD;  Location: [facility];  Service: Orthopedics;   Laterality: Left;       No family history on file.    Social History     Socioeconomic History   • Marital status:    Tobacco Use   • Smoking status: Never   Vaping Use   • Vaping Use: Unknown   Substance and Sexual Activity   • Alcohol use: Not Currently   • Drug use: Never           Objective   Physical Exam  Vitals and nursing note reviewed.   Constitutional:       Appearance: He is well-developed. He is ill-appearing.   HENT:      Head: Normocephalic and atraumatic.      Nose: Nose normal.      Mouth/Throat:      Mouth: Mucous membranes are dry.   Eyes:      Extraocular Movements: Extraocular movements intact.      Pupils: Pupils are equal, round, and reactive to light.   Neck:      Thyroid: No thyromegaly.      Trachea: No tracheal deviation.   Cardiovascular:      Rate and Rhythm: Regular rhythm. Tachycardia present.      Pulses:           Radial pulses are 2+ on the left side.        Dorsalis pedis pulses are 2+ on the right side and 2+ on the left side.      Heart sounds: S1 normal and S2 normal. Murmur heard.   Pulmonary:      Effort: Tachypnea, accessory muscle usage and respiratory distress present.      Breath sounds: Rhonchi (right lung lobes greater than left) present.      Comments: On nonrebreather mask with sats of 97%.  Abdominal:      Palpations: Abdomen is soft.   Musculoskeletal:         General: Normal range of motion.      Cervical back: Neck supple.   Skin:     General: Skin is warm and dry.      Capillary Refill: Capillary refill takes 2 to 3 seconds.   Neurological:      GCS: GCS eye subscore is 4. GCS verbal subscore is 3. GCS motor subscore is 5.      Comments: Eyes are open and patient is minimally interactive         Procedures           ED Course  ED Course as of 05/10/23 2217   Wed May 10, 2023   2000 Free water calculation of 4.6 L deficit.  We will start D5 and subcu insulin low-dose sliding scale. [TERESITA]   2142 Called for CT results. Reading is pending. [TERESITA]   2148 Half sepsis  bolus ordered as patient has a decreased ejection fraction of 41 to 45% from 2022. [TERESITA]      ED Course User Index  [TERESITA] Rocío Gary MD           Vitals:    05/10/23 2115 05/10/23 2145 05/10/23 2150 05/10/23 2211   BP: 110/55 99/54  97/52   BP Location:    Left arm   Patient Position:    Lying   Pulse: 98 96  107   Resp:    24   Temp:   99.9 °F (37.7 °C)    TempSrc:   Rectal    SpO2: 96% 97%  98%   Weight:       Height:          Lab Results (last 24 hours)     Procedure Component Value Units Date/Time    COVID-19 and FLU A/B PCR - Swab, Nasopharynx [[id number]]  (Normal) Collected: 05/10/23 2111    Specimen: Swab from Nasopharynx Updated: 05/10/23 2133     COVID19 Not Detected     Influenza A PCR Not Detected     Influenza B PCR Not Detected    Narrative:      Fact sheet for providers: https://www.fda.gov/media/917850/download    Fact sheet for patients: https://www.fda.gov/media/456139/download    Test performed by PCR.    MRSA Screen, PCR (Inpatient) - Swab, Nares [[id number]] Collected: 05/10/23 2108    Specimen: Swab from Nares Updated: 05/10/23 2111    POC Glucose Once [[id number]]  (Abnormal) Collected: 05/10/23 2020    Specimen: Blood Updated: 05/10/23 2032     Glucose 168 mg/dL      Comment: Result Not ConfirmedOperator: [id number] ALEXSANDER Guido ID: [id number]       Extra Tubes [[id number]] Collected: 05/10/23 1921    Specimen: Blood Updated: 05/10/23 2031    Narrative:      The following orders were created for panel order Extra Tubes.  Procedure                               Abnormality         Status                     ---------                               -----------         ------                     Lavender Top[[id number]]                                     Final result               Gold Top - SST[[id number]]                                   Final result               Green Top (Gel)[[id number]]                                  Final result               Light Blue Top[[id number]]                                    Final result                 Please view results for these tests on the individual orders.    Lavender Top [[id number]] Collected: 05/10/23 1921    Specimen: Blood Updated: 05/10/23 2031     Extra Tube hold for add-on     Comment: Auto resulted       Green Top (Gel) [[id number]] Collected: 05/10/23 1921    Specimen: Blood Updated: 05/10/23 2031     Extra Tube Hold for add-ons.     Comment: Auto resulted.       Gold Top - SST [[id number]] Collected: 05/10/23 1921    Specimen: Blood Updated: 05/10/23 2031     Extra Tube Hold for add-ons.     Comment: Auto resulted.       Light Blue Top [[id number]] Collected: 05/10/23 1921    Specimen: Blood Updated: 05/10/23 2031     Extra Tube Hold for add-ons.     Comment: Auto resulted       Extra Tubes [[id number]] Collected: 05/10/23 1908    Specimen: Blood Updated: 05/10/23 2016    Narrative:      The following orders were created for panel order Extra Tubes.  Procedure                               Abnormality         Status                     ---------                               -----------         ------                     Gold Top - SST[[id number]]                                   Final result               Light Blue Top[[id number]]                                   Final result                 Please view results for these tests on the individual orders.    Gold Top - SST [[id number]] Collected: 05/10/23 1908    Specimen: Blood Updated: 05/10/23 2016     Extra Tube Hold for add-ons.     Comment: Auto resulted.       Light Blue Top [[id number]] Collected: 05/10/23 1908    Specimen: Blood Updated: 05/10/23 2016     Extra Tube Hold for add-ons.     Comment: Auto resulted       Lactic Acid, Plasma [[id number]]  (Abnormal) Collected: 05/10/23 1921    Specimen: Blood Updated: 05/10/23 1952     Lactate 2.7 mmol/L     Procalcitonin [[id number]]  (Abnormal) Collected: 05/10/23 1908    Specimen: Blood Updated: 05/10/23 1951     Procalcitonin 0.34 ng/mL      "Narrative:      As a Marker for Sepsis (Non-Neonates):    1. <0.5 ng/mL represents a low risk of severe sepsis and/or septic shock.  2. >2 ng/mL represents a high risk of severe sepsis and/or septic shock.    As a Marker for Lower Respiratory Tract Infections that require antibiotic therapy:    PCT on Admission    Antibiotic Therapy       6-12 Hrs later    >0.5                Strongly Recommended  >0.25 - <0.5        Recommended   0.1 - 0.25          Discouraged              Remeasure/reassess PCT  <0.1                Strongly Discouraged     Remeasure/reassess PCT    As 28 day mortality risk marker: \"Change in Procalcitonin Result\" (>80% or <=80%) if Day 0 (or Day 1) and Day 4 values are available. Refer to http://www.[facility] [place]pct-calculator.com    Change in PCT <=80%  A decrease of PCT levels below or equal to 80% defines a positive change in PCT test result representing a higher risk for 28-day all-cause mortality of patients diagnosed with severe sepsis for septic shock.    Change in PCT >80%  A decrease of PCT levels of more than 80% defines a negative change in PCT result representing a lower risk for 28-day all-cause mortality of patients diagnosed with severe sepsis or septic shock.       Magnesium [[id number]]  (Abnormal) Collected: 05/10/23 1921    Specimen: Blood Updated: 05/10/23 1948     Magnesium 2.9 mg/dL     CBC & Differential [[id number]]  (Abnormal) Collected: 05/10/23 1908    Specimen: Blood Updated: 05/10/23 1947    Narrative:      The following orders were created for panel order CBC & Differential.  Procedure                               Abnormality         Status                     ---------                               -----------         ------                     CBC Auto Differential[[id number]]        Abnormal            Final result                 Please view results for these tests on the individual orders.    CBC Auto Differential [[id number]]  (Abnormal) Collected: 05/10/23 1908    " Specimen: Blood Updated: 05/10/23 1947     WBC 12.11 10*3/mm3      RBC 4.74 10*6/mm3      Hemoglobin 12.3 g/dL      Hematocrit 43.8 %      MCV 92.4 fL      MCH 25.9 pg      MCHC 28.1 g/dL      RDW 19.2 %      RDW-SD 64.5 fl      MPV 10.2 fL      Platelets 240 10*3/mm3      Neutrophil % 74.5 %      Lymphocyte % 18.2 %      Monocyte % 5.5 %      Eosinophil % 0.8 %      Basophil % 0.2 %      Immature Grans % 0.8 %      Neutrophils, Absolute 9.01 10*3/mm3      Lymphocytes, Absolute 2.21 10*3/mm3      Monocytes, Absolute 0.66 10*3/mm3      Eosinophils, Absolute 0.10 10*3/mm3      Basophils, Absolute 0.03 10*3/mm3      Immature Grans, Absolute 0.10 10*3/mm3      nRBC 0.0 /100 WBC     Comprehensive Metabolic Panel [[id number]]  (Abnormal) Collected: 05/10/23 1908    Specimen: Blood Updated: 05/10/23 1943     Glucose 287 mg/dL       mg/dL      Creatinine 2.38 mg/dL      Sodium 164 mmol/L      Potassium 3.9 mmol/L      Chloride 130 mmol/L      CO2 19.0 mmol/L      Calcium 9.9 mg/dL      Total Protein 7.2 g/dL      Albumin 2.9 g/dL      ALT (SGPT) 44 U/L      AST (SGOT) 38 U/L      Alkaline Phosphatase 111 U/L      Total Bilirubin <0.2 mg/dL      Globulin 4.3 gm/dL      A/G Ratio 0.7 g/dL      BUN/Creatinine Ratio 49.2     Anion Gap 15.0 mmol/L      eGFR 25.7 mL/min/1.73     Narrative:      GFR Normal >60  Chronic Kidney Disease <60  Kidney Failure <15    The GFR formula is only valid for adults with stable renal function between ages 18 and 70.    Blood Culture - Blood, Arm, Left [[id number]] Collected: 05/10/23 1921    Specimen: Blood from Arm, Left Updated: 05/10/23 1928    Blood Culture - Blood, Arm, Left [[id number]] Collected: 05/10/23 1921    Specimen: Blood from Arm, Left Updated: 05/10/23 1928         No radiology results for the last day                                   Medical Decision Making  Patient with sepsis with acute renal insufficiency, hypernatremia, acute respiratory failure requiring high flow  oxygen in the setting of deterioration and failure to thrive at nursing home.  Patient currently a DNR.  Half sepsis bolus given as patient has an ejection fraction measured 41 to 45% in 2022 and already compromised respiratory status.  Free water deficit calculated at 4.6 L.  D5 initiated with insulin coverage.  Levaquin given for probable pneumonia.  Urinalysis pending at time of admission.  CT abdomen and chest x-ray without acute findings.  Discussed care and case with Dr. Olguin who accepts patient for admission.    Acute respiratory failure with hypoxia: acute illness or injury  Hypernatremia: acute illness or injury  Sepsis with acute renal failure without septic shock, due to unspecified organism, unspecified acute renal failure type: acute illness or injury  Amount and/or Complexity of Data Reviewed  Labs: ordered.  Radiology: ordered.  ECG/medicine tests: ordered.      Risk  OTC drugs.  Prescription drug management.  Decision regarding hospitalization.          Final diagnoses:   Sepsis with acute renal failure without septic shock, due to unspecified organism, unspecified acute renal failure type   Acute respiratory failure with hypoxia   Hypernatremia       ED Disposition  ED Disposition     ED Disposition   Decision to Admit    Condition   --    Comment   Level of Care: Telemetry [5]   Diagnosis: Sepsis with acute renal failure without septic shock, due to unspecified organism, unspecified acute renal failure type [[id number]]   Admitting Physician: BEHROOZI, SAEID [[id number]]   Attending Physician: BEHROOZI, SAEID [[id number]]   Certification: I Certify That Inpatient Hospital Services Are Medically Necessary For Greater Than 2 Midnights               No follow-up provider specified.       Medication List      No changes were made to your prescriptions during this visit.       This document has been electronically signed by Rocío Gary MD on May 10, 2023 22:19 CDT    Rocío Gary MD   Part of this note may  be an electronic transcription/translation of spoken language to printed text using the Dragon Dictation System.        Rocío Gary MD  05/10/23 [id number]

## 2023-05-11 NOTE — PROGRESS NOTES
TWO PATIENT IDENTIFIERS WERE USED. CONSENT WAS SIGNED PER FAMILY EDUCATION MATERIAL WAS GIVEN TO PATIENT AND / OR FAMILY. THE PATIENT WAS DRAPED WITH FULL BODY DRAPE AND PATIENT'S RIGHT ARM WAS PREPPED WITH CHLORAPREP.  ULTRASOUND WAS USED TO LOCALIZE THERIGHT BASILIC  VEIN. SUBCUTANEOUS TISSUE AT THE CATHETER SITE WAS INFILTRATED WITH 2% LIDOCAINE. UNDER ULTRASOUND GUIDANCE, THE VEIN WAS ACCESSED WITH A 21GAUGE  NEEDLE. AN 0.018 WIRE WAS THEN THREADED THROUGH THE NEEDLE INTO THE CENTRAL VENOUS SYSTEM. THE 21GAUGE  NEEDLE WAS REMOVED AND A 5 Turkmen PEEL AWAY SHEATH WAS PLACED OVER THE WIRE. THE PICC LINE CATHETER WAS CUT AT 34 CM. THE PICC LINE CATHETER WAS THEN PLACED OVER THE WIRE INTO THE VEIN, THE SHEATH WAS PEELED AWAY,WIRE WAS REMOVED. CATHETER WAS FLUSHED WITH NORMAL SALINE AND TIPS APPLIED. BIOPATCH PLACED. CATHETER SECURED WITH STATLOCK AND TEGADERM. PATIENT TOLERATED PROCEDURE WELL. THIS WAS DONE IN THE   ICU      IMPRESSION: SUCCESSFUL PLACEMENT OF TRIPLE LUMEN SOLO PICC        Lorelei Stewart  5/11/2023  09:06 CDT

## 2023-05-11 NOTE — PROGRESS NOTES
Nurse Practitioner - Brief Progress Note  PERMANENT  05/11/2023 00:34    [facility] - CCU/SD - [facility] - [place] ([facility])    MARY GARCIA    Date of Service 05/11/2023 00:34    HPI/Events of Note [facility] Provider Assessment Note    88 y/o male PMH of suprapubic  cath placed on 2/2023, EF 41-45% in 2022, CAD, dementia, depression, left hip fracture, fal, HLD. Pt admitted to ICU from ER with c/o temp 100.1 F, desaturations from nursing home. In the ER, O2 sat in the 80s. Noted to   have serum sodium of 164, , creatinine 2.38, lactate 2.7. Nonrebreather mask, antibiotics, IV fluids ordered. Pt was transferred to ICU for further management.     Pt is a DNR.     Pertinent labs: Sodium 164 , potassium 3.9, bicarb 19, glucose 287, , creatinine 2.38, calcium 9.9, magnesium 2.9, AST 38, ALT 44, lactate 2.7 -> 2.3, Hgb 12.3, WBC 12.11, platelets 240, procalcitonin 0.34, Flu A&B/COVID-19 negative    UA: WBC's in urine TNTC, trace bacteria, small leukocyte esterase, negative nitrite    Per radiology  CT abd/pelvis: No acute intra-abdominal process.     CXR: Chronic elevation of the right hemidiaphragm with associated right basilar volume loss. Left lung appears clear. The heart is normal in size.  Mediastinal contour is within normal limits.  A large hiatal hernia projects over the heart. Old right   clavicle fracture.    EKG: Sinus tachycardia rate 106. QTc 475. Left axis deviation. Left bundle branch block    Assessment and Plan:    Acute hypoxic respiratory failure, sepsis possible secondary to UTI/ pneumonia, hypernatremia, acute renal failure, dehydration, failure to thrive  - Off NRB and on HFNC 12L with O2 sat 97% -wean FiO2 to keep O2 sat >92%. ABG this AM ordered. Neb treatments  - D5W 250 ml IV x1, NS 1800 mL IV bolus x1 given, with 1/2NS at 150 ml/hr  - Per note: Free water deficit calculated at 4.6 L  - BMP q6h, lactate this AM ordered  -  Zosyn, IV Levaquin pharmacy to dose. Urine/blood cultures ordered  - Wound care consulted for multiple pressure wounds  - Tylenol PRN pain/fever  - Suprapubic cath placed on 2/2023  - Renal dose medications. Monitor BMP, UOP  - Nutrition consult for failure to thrive        __X___   Video Assessment performed.  __X___   Most recent labs reviewed  __X___   Vital Signs reviewed. HR 94, /57, RR 24, O2 sat 97%, temp 99.9 F, GCS 10  __X___   Best Practices addressed:                 VTE prophylaxis: Heparin subq                 SUP (when indicated): n/a                 Current Glucose: Accu-Chek SSI q6h                      Please notify bedside physician when present or [facility] if glucose > 180 X 2                 Sepsis guidelines:  yes                 Lung protective strategy: N/A                 Targeted Temperature Management: N/A  __X___     Spoke with bedside RN  __X___     Orders written    Contact [facility] for any needs if bedside physician is not present.  Note drafted by VIDAL Rodriguez-BC       Critical care; 35 minutes total evaluation time     Care during the described time interval was provided by me.  I have reviewed this patient's available data, including medical history, events of note, physical examination and test results as part of my evaluation.    Interventions Major-Acute renal failure - evaluation and management, Electrolyte abnormality - evaluation and management, Hypovolemia - evaluation and treatment with fluids, Respiratory failure - evaluation and management  Intermediate-Best-practice therapies (e.g. VTE, beta blocker, etc.), Communication with other healthcare providers and/or family, Hyperglycemia - evaluation and treatment        Electronically Signed by: Gladys Beard (ANP,CCRN) on 05/11/2023 01:11    Annotated By: Veronica Germain)    Date: 05/11/23 02:31  Agree with the above documentation with the following comments/additions:     Patient admitted with  acute hypoxemic respiratory failure, UTI, and hypernatremia. History of receiving syringe feeds due to poor PO intake raises suspicion for aspiration event. However, his CXR does not demonstrate aspiration and is underwhelming for   his degree of hypoxemia. May need to consider working him up for PE in the setting of immobility.    Noted that he has been started on zosyn and levaquin. I am not certain that he needs the levaquin and he is at risk of having adverse effects from FQs given advanced aged. Would consider stopping this. The only prior culture data that I am able to see is   a urine culture from earlier this year where he grew 50k providencia, which was sensitive to zosyn.     Will need to slowly correct Na and monitor BMPs to ensure that he does not precipitously drop.

## 2023-05-11 NOTE — H&P
[facility] Medicine Admission      Date of Admission: 5/10/2023      Primary Care Physician: Radha Abraham DO      Chief Complaint: Rattling over his chest    HPI:    Patient is a 87-year-old bedridden resident of nursing facility, severely debilitated male mostly nonverbal with advanced dementia coronary artery disease, myocardial infarction, hyperlipidemia, depression, hip fracture, fall pressure wounds, suprapubic catheter present presently has been in nursing facility since December 2022 with progressive decline in health has been receiving fluid and nutritional boost through strength into his mouth per his wife was brought to the ER today concerning his wife hears rattling over his chest, oxygen desaturation and reported fever.  Patient had severe hypernatremia, and was diagnosed with sepsis and acute renal failure .  Patient was placed on D5W.  On transfer to this facility he was on nonrebreather with reported pulse oximetry in 70s.  He was transitioned to nasal cannula in ED.  Hospitalist service was called for admission of the patient.    I have seen and examined this patient in ED room 9.  His wife Gita at bedside as his daughter and son.  Patient elderly ill looking slim, cachectic emaciated  Male,  in very poor general health.  He is severely dehydrated looking. he is awake but does not provide any information he does not follow any command..  His wife patient had progressive decline in health.  He had fever intermittently over few days.  He has not been eating and drinking and the staff putting nutrition through a syringe in his mouth.  He has been nonverbal recently.  No other specific information available    Concurrent Medical History:  has a past medical history of Coronary artery disease, Dementia, Depression, Hip fracture, fall, Hyperlipidemia, and Myocardial infarction.    Past Surgical History:  has a past surgical history that includes Carotid  stent; Hip Trochanteric Nailing (Left, 12/2/2022); cystoscopy, retrograde pyelogram and stent insertion (Bilateral, 12/4/2022); and Cystoscopy (N/A, 2/20/2023).    Family History: family history is not on file.     Social History:  reports that he has never smoked. He does not have any smokeless tobacco history on file. He reports that he does not currently use alcohol. He reports that he does not use drugs.    Allergies:   Allergies   Allergen Reactions   • Aleve [Naproxen] Unknown - High Severity   • Cefuroxime Hallucinations   • Corticosteroids Other (See Comments)     unknown   • Morphine Other (See Comments)     unknown   • Tramadol Other (See Comments)     unknown       Medications:   Prior to Admission medications    Medication Sig Start Date End Date Taking? Authorizing Provider   acetaminophen (TYLENOL) 325 MG tablet Take 1 tablet by mouth Every 4 (Four) Hours As Needed for Fever, Headache or Mild Pain. 1/23/23  Yes Chico Piña MD   aspirin 81 MG chewable tablet Chew 1 tablet Daily. on hold  Indications: Disease involving Lipid Deposits in the Arteries 1/1/22  Yes Janina Motley APRN   atorvastatin (LIPITOR) 40 MG tablet Take 1 tablet by mouth Every Night. Indications: High Amount of Fats in the Blood 2/15/22  Yes Janina Motley APRN   escitalopram (LEXAPRO) 5 MG tablet Take 1 tablet by mouth Daily. Indications: Major Depressive Disorder 10/20/22  Yes Terry Drummond MD   multivitamin with minerals tablet tablet Take 1 tablet by mouth Daily. 1/23/23  Yes Radha Abraham DO   nitroglycerin (NITROSTAT) 0.4 MG SL tablet Place 1 tablet under the tongue Every 5 (Five) Minutes As Needed. Indications: Acute Angina Pectoris 2/14/22  Yes Janina Motley APRN   risperiDONE (risperDAL) 0.25 MG tablet Take 1 tablet by mouth 2 (Two) Times a Day. 1/30/23  Yes [place] Piña MD   rivastigmine (EXELON) 4.6 MG/24HR patch Place 1 patch on the skin as directed by provider  Daily. Indications: Alzheimer's Disease 10/20/22  Yes Terry Drummond MD   Vitamin D, Cholecalciferol, (CHOLECALCIFEROL) 10 MCG (400 UNIT) tablet Take 1 tablet by mouth Daily. Indications: supplementation 1/1/22  Yes Janina Motley APRN   albuterol sulfate  (90 Base) MCG/ACT inhaler Inhale 2 puffs Every 4 (Four) Hours As Needed for Shortness of Air or Wheezing. 1/1/22   Janina Motley APRN   Fluticasone-Salmeterol (ADVAIR/WIXELA) 100-50 MCG/ACT DISKUS Inhale 2 (Two) Times a Day. Pt takes 250/50 1 puff q12 hours    Provider, MD Chico       Review of Systems:  Review of Systems   Otherwise complete ROS is negative except as mentioned above.  Review of system is not obtainable from the patient    Physical Exam:   Temp:  [99.9 °F (37.7 °C)-100.8 °F (38.2 °C)] 99.9 °F (37.7 °C)  Heart Rate:  [] 107  Resp:  [24-34] 24  BP: ()/(49-55) 97/52  Physical Exam  Constitutional:       General: He is not in acute distress.     Appearance: He is ill-appearing. He is not toxic-appearing or diaphoretic.   HENT:      Head: Normocephalic and atraumatic.      Right Ear: External ear normal.      Left Ear: External ear normal.      Nose: Nose normal.      Mouth/Throat:      Mouth: Mucous membranes are dry.   Eyes:      Extraocular Movements: Extraocular movements intact.      Conjunctiva/sclera: Conjunctivae normal.   Cardiovascular:      Rate and Rhythm: Normal rate and regular rhythm.      Heart sounds: No murmur heard.    No friction rub. No gallop.   Pulmonary:      Effort: No respiratory distress.      Breath sounds: No stridor. No wheezing or rales.      Comments: Decreased breath  Chest:      Chest wall: No tenderness.   Abdominal:      General: Abdomen is flat. There is no distension.      Palpations: Abdomen is soft.      Tenderness: There is no abdominal tenderness. There is no guarding or rebound.      Comments: Suprapubic catheter present   Musculoskeletal:         General: No  swelling or tenderness.      Cervical back: No rigidity or tenderness.      Right lower leg: No edema.      Left lower leg: No edema.      Comments: Severe muscular wasting   Lymphadenopathy:      Cervical: No cervical adenopathy.   Skin:     General: Skin is warm and dry.      Coloration: Skin is not jaundiced.      Findings: No erythema.      Comments: Pressure wound healed, reported pressure wound buttock   Neurological:      Mental Status: He is alert.      Sensory: No sensory deficit.      Motor: No weakness.      Coordination: Coordination normal.      Comments: Awake, nonverbal, does not response to verbal and minimal response to tactile stimuli.   Psychiatric:      Comments: Not obtainable           Results Reviewed:  I have personally reviewed current lab, radiology, and data and agree with results.  Lab Results (last 24 hours)     Procedure Component Value Units Date/Time    POC Glucose Once [[id number]]  (Abnormal) Collected: 05/10/23 2209    Specimen: Blood Updated: 05/10/23 2225     Glucose 259 mg/dL      Comment: Result Not ConfirmedOperator: [id number] [id number]       MRSA Screen, PCR (Inpatient) - Swab, Nares [[id number]]  (Abnormal) Collected: 05/10/23 2108    Specimen: Swab from Nares Updated: 05/10/23 2223     MRSA, PCR Positive    Narrative:      Performed by real-time polymerase chain reaction (qPCR).  The negative predictive value of this diagnostic test is high and should only be used to consider de-escalating anti-MRSA therapy. A positive result may indicate colonization with MRSA and must be correlated clinically.    COVID-19 and FLU A/B PCR - Swab, Nasopharynx [[id number]]  (Normal) Collected: 05/10/23 2111    Specimen: Swab from Nasopharynx Updated: 05/10/23 2133     COVID19 Not Detected     Influenza A PCR Not Detected     Influenza B PCR Not Detected    Narrative:      Fact sheet for providers: https://www.fda.gov/media/440353/download    Fact sheet for patients:  https://www.fda.gov/media/881063/download    Test performed by PCR.    POC Glucose Once [[id number]]  (Abnormal) Collected: 05/10/23 2020    Specimen: Blood Updated: 05/10/23 2032     Glucose 168 mg/dL      Comment: Result Not ConfirmedOperator: [id number] ALEXSANDER Lora ID: [id number]       Extra Tubes [[id number]] Collected: 05/10/23 1921    Specimen: Blood Updated: 05/10/23 2031    Narrative:      The following orders were created for panel order Extra Tubes.  Procedure                               Abnormality         Status                     ---------                               -----------         ------                     Lavender Top[[id number]]                                     Final result               Gold Top - SST[[id number]]                                   Final result               Green Top (Gel)[[id number]]                                  Final result               Light Blue Top[[id number]]                                   Final result                 Please view results for these tests on the individual orders.    Lavender Top [[id number]] Collected: 05/10/23 1921    Specimen: Blood Updated: 05/10/23 2031     Extra Tube hold for add-on     Comment: Auto resulted       Green Top (Gel) [[id number]] Collected: 05/10/23 1921    Specimen: Blood Updated: 05/10/23 2031     Extra Tube Hold for add-ons.     Comment: Auto resulted.       Gold Top - SST [[id number]] Collected: 05/10/23 1921    Specimen: Blood Updated: 05/10/23 2031     Extra Tube Hold for add-ons.     Comment: Auto resulted.       Light Blue Top [[id number]] Collected: 05/10/23 1921    Specimen: Blood Updated: 05/10/23 2031     Extra Tube Hold for add-ons.     Comment: Auto resulted       Extra Tubes [[id number]] Collected: 05/10/23 1908    Specimen: Blood Updated: 05/10/23 2016    Narrative:      The following orders were created for panel order Extra Tubes.  Procedure                               Abnormality         Status         "             ---------                               -----------         ------                     Gold Top - SST[[id number]]                                   Final result               Light Blue Top[[id number]]                                   Final result                 Please view results for these tests on the individual orders.    Gold Top - SST [[id number]] Collected: 05/10/23 1908    Specimen: Blood Updated: 05/10/23 2016     Extra Tube Hold for add-ons.     Comment: Auto resulted.       Light Blue Top [[id number]] Collected: 05/10/23 1908    Specimen: Blood Updated: 05/10/23 2016     Extra Tube Hold for add-ons.     Comment: Auto resulted       Lactic Acid, Plasma [[id number]]  (Abnormal) Collected: 05/10/23 1921    Specimen: Blood Updated: 05/10/23 1952     Lactate 2.7 mmol/L     Procalcitonin [[id number]]  (Abnormal) Collected: 05/10/23 1908    Specimen: Blood Updated: 05/10/23 1951     Procalcitonin 0.34 ng/mL     Narrative:      As a Marker for Sepsis (Non-Neonates):    1. <0.5 ng/mL represents a low risk of severe sepsis and/or septic shock.  2. >2 ng/mL represents a high risk of severe sepsis and/or septic shock.    As a Marker for Lower Respiratory Tract Infections that require antibiotic therapy:    PCT on Admission    Antibiotic Therapy       6-12 Hrs later    >0.5                Strongly Recommended  >0.25 - <0.5        Recommended   0.1 - 0.25          Discouraged              Remeasure/reassess PCT  <0.1                Strongly Discouraged     Remeasure/reassess PCT    As 28 day mortality risk marker: \"Change in Procalcitonin Result\" (>80% or <=80%) if Day 0 (or Day 1) and Day 4 values are available. Refer to http://www.[employer]-pct-calculator.com    Change in PCT <=80%  A decrease of PCT levels below or equal to 80% defines a positive change in PCT test result representing a higher risk for 28-day all-cause mortality of patients diagnosed with severe sepsis for septic shock.    Change in PCT " >80%  A decrease of PCT levels of more than 80% defines a negative change in PCT result representing a lower risk for 28-day all-cause mortality of patients diagnosed with severe sepsis or septic shock.       Magnesium [[id number]]  (Abnormal) Collected: 05/10/23 1921    Specimen: Blood Updated: 05/10/23 1948     Magnesium 2.9 mg/dL     CBC & Differential [[id number]]  (Abnormal) Collected: 05/10/23 1908    Specimen: Blood Updated: 05/10/23 1947    Narrative:      The following orders were created for panel order CBC & Differential.  Procedure                               Abnormality         Status                     ---------                               -----------         ------                     CBC Auto Differential[[id number]]        Abnormal            Final result                 Please view results for these tests on the individual orders.    CBC Auto Differential [[id number]]  (Abnormal) Collected: 05/10/23 1908    Specimen: Blood Updated: 05/10/23 1947     WBC 12.11 10*3/mm3      RBC 4.74 10*6/mm3      Hemoglobin 12.3 g/dL      Hematocrit 43.8 %      MCV 92.4 fL      MCH 25.9 pg      MCHC 28.1 g/dL      RDW 19.2 %      RDW-SD 64.5 fl      MPV 10.2 fL      Platelets 240 10*3/mm3      Neutrophil % 74.5 %      Lymphocyte % 18.2 %      Monocyte % 5.5 %      Eosinophil % 0.8 %      Basophil % 0.2 %      Immature Grans % 0.8 %      Neutrophils, Absolute 9.01 10*3/mm3      Lymphocytes, Absolute 2.21 10*3/mm3      Monocytes, Absolute 0.66 10*3/mm3      Eosinophils, Absolute 0.10 10*3/mm3      Basophils, Absolute 0.03 10*3/mm3      Immature Grans, Absolute 0.10 10*3/mm3      nRBC 0.0 /100 WBC     Comprehensive Metabolic Panel [[id number]]  (Abnormal) Collected: 05/10/23 1908    Specimen: Blood Updated: 05/10/23 1943     Glucose 287 mg/dL       mg/dL      Creatinine 2.38 mg/dL      Sodium 164 mmol/L      Potassium 3.9 mmol/L      Chloride 130 mmol/L      CO2 19.0 mmol/L      Calcium 9.9 mg/dL      Total  Protein 7.2 g/dL      Albumin 2.9 g/dL      ALT (SGPT) 44 U/L      AST (SGOT) 38 U/L      Alkaline Phosphatase 111 U/L      Total Bilirubin <0.2 mg/dL      Globulin 4.3 gm/dL      A/G Ratio 0.7 g/dL      BUN/Creatinine Ratio 49.2     Anion Gap 15.0 mmol/L      eGFR 25.7 mL/min/1.73     Narrative:      GFR Normal >60  Chronic Kidney Disease <60  Kidney Failure <15    The GFR formula is only valid for adults with stable renal function between ages 18 and 70.    Blood Culture - Blood, Arm, Left [[id number]] Collected: 05/10/23 1921    Specimen: Blood from Arm, Left Updated: 05/10/23 1928    Blood Culture - Blood, Arm, Left [[id number]] Collected: 05/10/23 1921    Specimen: Blood from Arm, Left Updated: 05/10/23 1928        Imaging Results (Last 24 Hours)     Procedure Component Value Units Date/Time    CT Abdomen Pelvis Without Contrast [[id number]] Resulted: 05/10/23 2002     Updated: 05/10/23 2008    XR Chest 1 View [[id number]] Collected: 05/10/23 1946     Updated: 05/10/23 1954    Narrative:      AP CHEST    HISTORY:  Respiratory distress.    COMPARISON:  10/2/2022    FINDINGS:  Chronic elevation of the right hemidiaphragm with associated right basilar  volume loss. Left lung appears clear.    The heart is normal in size.  Mediastinal contour is within normal limits.  A  large hiatal hernia projects over the heart.    Old right clavicle fracture.              Assessment:    Active Hospital Problems    Diagnosis    • **Sepsis with acute renal failure without septic shock, due to unspecified organism, unspecified acute renal failure type      # Sepsis, sepsis present on admission cannot be exclude  # Severe dehydration  # Severe free water deficit   # Severe hypernatremia secondary to free water deficit and dehydration  # Acute renal failure   # Hyperchloremia  # Advanced dementia, associated acute metabolic encephalopathy   # Metabolic acidosis with acute renal failure  # Severe protein caloric malnutrition  #  Hyperglycemia concerning diabetes mellitus  # Elevated lactic acid blood   # Leukocytosis   # Left bundle branch block   # Suprapubic catheter.      Treatment plan:  I discussed the care with ED attending Dr. Gary patient's wife and daughter and son at bedside  Admit to inpatient   Maintain on telemetry  I reviewed independently laboratory work-up imaging studies and  Obtain further laboratory work-up hemoglobin A1c level lipid panel, ammonia level  Blood culture  Urine culture  Follow CBC CMP magnesium level lactic acid level.  We will obtain results of CT of the abdomen and pelvis.  Give IV fluid bolus normal saline.  Discontinue D5W  Place on half-normal saline IV fluid  For correction of severe dehydration, with severe hypernatremia in setting of free water deficit and severe dehydration.  Follow BMP every 6 hours.  Avoid rapid correction of hyponatremia to minimize risk of associated complications.  Place on broad-spectrum IV antibiotic concerning sepsis  Accu-Chek ACHS/every 6 hours while NPO  Insulin sliding scale.  Obtain hemoglobin A1c level.   Obtain nutritional service consultation severe malnutrition  Obtain  speech therapist service consultation for swallow evaluation  Obtain wound care service consultation  Comorbidities, chronic medical problems will be treated appropriate  Reconcile home medication and continue with essential home medication if needed.  DVT and GI prophylaxis will be initiated.  Please see orders for comprehensive plan.    Overall prognosis  poor.  Patient is at high risk of medical complications and poor outcome.  I informed patient's family in this regard.  Patient's family decided for DNR DNI.  Patient may benefit from discussion about palliative care, hospice care during hospitalization        Medical Decision Making  Number and Complexity of problems: Multiple complex acute medical problems.  High complexity decision making.  Differential Diagnosis: Entertained considered and  reflected in orders for    Conditions and Status:        Condition is worsening.     [facility] Data  External documents reviewed:   My EKG interpretation: Left bundle branch block  My plain film interpretation: Elevation of right hemidiaphragm  Tests considered but not ordered:      Decision rules/scores evaluated (example [id number], Wells, etc):      Discussed with: ED attending and patient's family   I have utilized all available immediate resources to obtain, update, or review the patient's current medications (including all prescriptions, over-the-counter products, herbals, cannabis/cannabidiol products, and vitamin/mineral/dietary (nutritional) supplements).          Care Planning  Shared decision making: ED attending and patient's family, wife son and daughter  Code status and discussions: DNR/DNI on wish of patient's wife and family at bedside.  His wife Gita is patient's healthcare proxy.    Disposition  Social Determinants of Health that impact treatment or disposition:   I expect the patient to be discharged to to be determined (nursing home if survive hospitalization) in to be determined days.          I confirmed that the patient's Advance Care Plan is present, code status is documented, or surrogate decision maker is listed in the patient's medical record.     I have utilized all available immediate resources to obtain, update, or review the patient's current medications.     I discussed the patient's findings and my recommendations with: Patient's family and they agreed with above plan of care    Saeid Behroozi, MD   05/10/23   22:30 CDT

## 2023-05-11 NOTE — NURSING NOTE
Patient resting comfortably. Spouse requests that patient not be turned at this time. Encouraged to tell nurses when she wants patient turned even if it's in five minutes. Spouse and granddaughter verbalize understanding.

## 2023-05-11 NOTE — PLAN OF CARE
Problem: Adult Inpatient Plan of Care  Goal: Plan of Care Review  Flowsheets (Taken 5/11/2023 1019)  Progress: no change (Pended)   Goal Outcome Evaluation:           Progress: (P) no change     RD staff consulted for MSA. Pt did not acknowledge RD staff and is nonverbal. Pt has been receiving liquid nutrition via oral syringe at NH for the past week. Pt previously on a liquid diet. Pt will benefit from SLP eval and TF but not warranted at tthis time d/t pending family decision on plan of care. Pt has lost 17# (12%) x 5 months.   Pt met ASPEN criteria for severe chronic malnutrition.   RD staff will monitor and make nutrition recommendations as necessary.

## 2023-05-11 NOTE — NURSING NOTE
RT called to room to switch pt from [facility] to [facility]. Xray was in room to take pt to CT. RT will place when pt is back

## 2023-05-11 NOTE — CONSULTS
Adult Nutrition  Assessment/PES    Patient Name:  Teofilo Brown  YOB: 1935  MRN: [id number]  Admit Date:  5/10/2023    Assessment Date:  5/11/2023    Comments:    Pt admitted for sepsis with acute renal failure. PMHx includes dementia, COPD, HTN, HLD, CAD, and neurocognitive disorder. RD staff consulted for MSA. Pt did not acknowledge RD staff and is nonverbal. RD staff spoke with pt's wife. NH reports pt has been deteriorating and experiencing failure to thrive. Pt has been receiving liquid nutrition via oral syringe at NH for the past week. Prior to syringe feeds, pt was on a liquid diet per wife and would drink protein shakes occasionally with assistance. Wife states pt has not been eating or drinking enough and usually coughs after eating and drinking. Coughing is possibly d/t dysphagia and/or oral syringe bolus feeds. Pt will benefit from SLP eval but family does not want one completed at this time. Pt may benefit from TF but is not warranted at this time d/t pending family decision on plan of care. CBW: 123#. BW 12/2022: 140#. Pt has lost 17# (12%) x 5 months.   NFPE was conducted and pt met ASPEN criteria for severe chronic malnutrition as evidenced by severe wt loss of 12% x 5 months, severe muscle loss, and moderate subcutaneous fat loss.   No noted edema. No noted wounds. LBM 5/11. No noted intakes d/t current npo diet.   RD staff will continue to monitor and make nutrition recommendations as necessary.      Reason for Assessment     Row Name 05/11/23 0931          Reason for Assessment    Reason For Assessment physician consult;identified at risk by screening criteria (P)   MSA     Identified At Risk by Screening Criteria MST SCORE 2+;BMI (P)   MST 3, BMI 18.81                Nutrition/Diet History     Row Name 05/11/23 8604          Nutrition/Diet History    Typical Intake (Food/Fluid/EN/PN) Pt currently npo and has been getting fed with syringe at NH x 1 week. Mostly liquid diet  prior to syringe feeding (P)                 Labs/Tests/Procedures/Meds     Row Name 05/11/23 0938          Labs/Procedures/Meds    Lab Results Reviewed reviewed, pertinent (P)      Lab Results Comments Na 157, Cl 127, , Creat 2.02. A1c 5.9, Alb 2.3 (P)         Diagnostic Tests/Procedures    Diagnostic Test/Procedure Reviewed reviewed (P)         Medications    Pertinent Medications Reviewed reviewed, pertinent (P)      Pertinent Medications Comments SSI, heparin, levo, IVF @ 150 ml/hr (P)                   Estimated/Assessed Needs - Anthropometrics     Row Name 05/11/23 0939 05/11/23 0500       Anthropometrics    Weight -- 56.1 kg (123 lb 10.9 oz)    Weight for Calculation 55.8 kg (123 lb) (P)  --       Estimated/Assessed Needs    Additional Documentation KCAL/KG (Group);Protein Requirements (Group);Fluid Requirements (Group) (P)  --       KCAL/KG    KCAL/KG 25 Kcal/Kg (kcal);30 Kcal/Kg (kcal) (P)  --    25 Kcal/Kg (kcal) 1394.8 (P)  --    30 Kcal/Kg (kcal) 1673.76 (P)  --       Protein Requirements    Weight Used For Protein Calculations 55.8 kg (123 lb) (P)  --    Est Protein Requirement Amount (gms/kg) 1.0 gm protein (P)  --    Estimated Protein Requirements (gms/day) 55.79 (P)  --       Fluid Requirements    Fluid Requirements (mL/day) 1500 (P)  --    RDA Method (mL) 1500 (P)  --               Nutrition Prescription Ordered     Row Name 05/11/23 0940          Nutrition Prescription PO    Current PO Diet NPO (P)                   Malnutrition Severity Assessment     Row Name 05/11/23 0940          Malnutrition Severity Assessment    Malnutrition Type Chronic Disease - Related Malnutrition (P)         Unintentional Weight Loss     Unintentional Weight Loss Findings Severe (P)      Unintentional Weight Loss  Weight loss greater than 7.5% in three months (P)   Wt loss 12% x 5 months        Muscle Loss    Loss of Muscle Mass Findings Severe (P)      [place] Region Moderate - slight depression (P)      Clavicle  Bone Region Moderate - some protrusion in females, visible in males (P)      Acromion Bone Region Moderate - acromion may slightly protrude (P)      Dorsal Hand Region Severe - prominent depression (P)      Patellar Region Severe - prominent bone, square looking, very little muscle definition (P)      Anterior Thigh Region Severe - line/depression along thigh, obviously thin (P)      Posterior Calf Region Severe - thin with very little definition/firmness (P)         Fat Loss    Subcutaneous Fat Loss Findings Moderate (P)      Orbital Region  Moderate -  somewhat hollowness, slightly dark circles (P)      Upper Arm Region Severe - mostly skin, very little space between folds, fingers touch (P)         Criteria Met (Must meet criteria for severity in at least 2 of these categories: M Wasting, Fat Loss, Fluid, Secondary Signs, Wt. Status, Intake)    Patient meets criteria for  Severe Malnutrition (P)                  Problem/Interventions:   Problem 1     Row Name 05/11/23 0958          Nutrition Diagnoses Problem 1    Problem 1 Malnutrition (P)      Etiology (related to) Factors Affecting Nutrition (P)      Appetite Poor (P)      Oral Chewing Difficulty;Swallowing Difficulty (P)      Signs/Symptoms (evidenced by) Other (comment);Unintended Weight Change (P)   muscle loss and fat loss     Unintended Weight Change Loss (P)      Number of Pounds Lost 17 (P)      Weight loss time period 5 months (P)                 Problem 2     Row Name 05/11/23 0959          Nutrition Diagnoses Problem 2    Problem 2 Predicted Suboptimal Intake (P)      Etiology (related to) Functional Diagnosis;Medical Diagnosis (P)      Neurological Dementia (P)      Functional Diagnosis Self-feed difficulty (P)      Signs/Symptoms (evidenced by) NPO;Report/Observation;PO diet not tolerated;Other (comment) (P)   Received liquid diet through syringe at NH     Reported/Observed By RN;Family (P)                 Problem 3     Row Name 05/11/23 1000           Nutrition Diagnoses Problem 3    Problem 3 Inadequate Nutrient Intake (P)      Etiology (related to) Medical Diagnosis;Functional Diagnosis (P)      Neurological Dementia (P)      Functional Diagnosis Failure to thrive;Self-feed difficulty (P)      Signs/Symptoms (evidenced by) Report of Minimal PO Intake;Unintended Weight Change;Report/Observation (P)      Reported/Observed By Family;RN (P)      Unintended Weight Change Loss (P)      Number of Pounds Lost 17 (P)      Weight loss time period 5 months (P)                   Intervention Goal     Row Name 05/11/23 1003          Intervention Goal    General Maintain nutrition;Meet nutritional needs for age/condition (P)      PO Meet estimated needs;Advance diet (P)      Weight No significant weight loss (P)                 Nutrition Intervention     Row Name 05/11/23 1003          Nutrition Intervention    RD/Tech Action Follow Tx progress;Await begin PO (P)                   Education/Evaluation     Row Name 05/11/23 1003          Education    Education Education not appropriate at this time (P)      Please explain Patient nonverbal;Patient confusion (P)         Monitor/Evaluation    Monitor Per protocol;Pertinent labs;Weight;Skin status (P)                  Electronically signed by:  Abbie Savage  05/11/23 10:04 CDT

## 2023-05-11 NOTE — NURSING NOTE
Patient admitted to hospital with multiple wounds. Wounds were not photographed on arrival and family requests that the patient not be disturbed to take pictures as he is now comfort care and they feel that taking pictures would cause him undue pain at this time. Family also refused 1400 turn as patient is resting comfortably.

## 2023-05-11 NOTE — PLAN OF CARE
Goal Outcome Evaluation: Pt afebrile, Pt received three 1L NS bolus due to hypotension, levophed started. Pt is nonverbal. Wife is at bedside.

## 2023-05-12 NOTE — PROGRESS NOTES
Adult Nutrition  Assessment    Patient Name:  Teofilo Brown  YOB: 1935  MRN: [id number]  Admit Date:  5/10/2023    Assessment Date:  5/12/2023    Comments:    Pt transitioned to comfort care. RD staff available as needed.   Noted wounds: L posterior heel PI, R posterior heel PI, R medial gluteal PI, L medial gluteal PI, medial coccyx PI                      Electronically signed by:  Abbie Savage  05/12/23 07:46 CDT

## 2023-05-12 NOTE — PROGRESS NOTES
[facility] Medicine Services  INPATIENT PROGRESS NOTE    Length of Stay: 2  Date of Admission: 5/10/2023  Primary Care Physician: Radha Abraham DO    Subjective   Chief Complaint: Shortness of breath  HPI: Patient remains essentially unresponsive    As of today, 05/12/23  Review of Systems   Unable to perform ROS: Patient unresponsive        All pertinent negatives and positives are as above. All other systems have been reviewed and are negative unless otherwise stated.    Objective    Temp:  [97.3 °F (36.3 °C)-97.7 °F (36.5 °C)] 97.3 °F (36.3 °C)  Heart Rate:  [79-90] 84  Resp:  [16-22] 16  BP: ()/(48-58) 123/58    As of today, 05/12/23  Physical Exam  Vitals reviewed.   Constitutional:       Appearance: He is well-developed.   HENT:      Head: Normocephalic and atraumatic.   Eyes:      Pupils: Pupils are equal, round, and reactive to light.   Cardiovascular:      Rate and Rhythm: Normal rate and regular rhythm.      Heart sounds: Normal heart sounds. No murmur heard.    No friction rub. No gallop.   Pulmonary:      Effort: Pulmonary effort is normal. No respiratory distress.      Breath sounds: No wheezing or rales.      Comments: Coarse breath sounds  Chest:      Chest wall: No tenderness.   Abdominal:      General: Bowel sounds are normal. There is no distension.      Palpations: Abdomen is soft.      Tenderness: There is no abdominal tenderness.   Musculoskeletal:      Cervical back: Normal range of motion and neck supple.   Neurological:      Comments: Minimally responsive       Results Review:  I have reviewed the labs, radiology results, and diagnostic studies.    Laboratory Data:   Results from last 7 days   Lab Units 05/11/23  0427 05/10/23  1908   SODIUM mmol/L 157*  157* 164*   POTASSIUM mmol/L 3.9  3.9 3.9   CHLORIDE mmol/L 127*  127* 130*   CO2 mmol/L 18.0*  18.0* 19.0*   BUN mg/dL 101*  101* 117*   CREATININE mg/dL 2.02*  2.02* 2.38*    GLUCOSE mg/dL 96  96 287*   CALCIUM mg/dL 8.5*  8.5* 9.9   BILIRUBIN mg/dL 0.3 <0.2   ALK PHOS U/L 78 111   ALT (SGPT) U/L 33 44*   AST (SGOT) U/L 32 38   ANION GAP mmol/L 12.0  12.0 15.0     Estimated Creatinine Clearance: 20.4 mL/min (A) (by C-G formula based on SCr of 2.02 mg/dL (H)).  Results from last 7 days   Lab Units 05/11/23  0427 05/10/23  1921   MAGNESIUM mg/dL 2.3 2.9*   PHOSPHORUS mg/dL 3.1  --          Results from last 7 days   Lab Units 05/11/23  0427 05/10/23  1908   WBC 10*3/mm3 15.33* 12.11*   HEMOGLOBIN g/dL 9.5* 12.3*   HEMATOCRIT % 33.2* 43.8   PLATELETS 10*3/mm3 183 240     Results from last 7 days   Lab Units 05/11/23 0423   INR  1.61*       Culture Data:   Blood Culture   Date Value Ref Range Status   05/10/2023 No growth at 24 hours  Preliminary   05/10/2023 No growth at 24 hours  Preliminary     Urine Culture   Date Value Ref Range Status   05/10/2023 >100,000 CFU/mL Mixed Leann Isolated  Final     No results found for: RESPCX  No results found for: WOUNDCX  No results found for: STOOLCX  No components found for: BODYFLD    Radiology Data:   Imaging Results (Last 24 Hours)     ** No results found for the last 24 hours. **          I have utilized all available immediate resources to obtain, update, or review the patient's current medications (including all prescriptions, over-the-counter products, herbals, cannabis/cannabidiol products, and vitamin/mineral/dietary (nutritional) supplements).       Assessment/Plan     Active Hospital Problems    Diagnosis    • **Sepsis with acute renal failure without septic shock, due to unspecified organism, unspecified acute renal failure type    • Severe Malnutrition (HCC)        Plan:  1.  Sepsis with septic shock  2.  Severe hypernatremia  3.  Acute renal failure  4.  Metabolic acidosis  5.  Severe protein calorie malnutrition  6.  Decubitus ulcers present on admission    Patient appears comfortable.  Continue comfort measures.    Medical  Decision Making  Number and Complexity of problems: Multiple highly complex medical problems.    Conditions and Status:        Condition is worsening.     [facility] Data  External documents reviewed: Outpatient notes  My EKG interpretation: Sinus tachycardia, left axis deviation, left bundle branch block  My plain film interpretation: Elevated right hemidiaphragm, no acute cardiopulmonary disease     Discussed with: Family     Treatment Plan  As above    Care Planning  Shared decision making: Family agreement with plan of care  Code status and discussions: Comfort measures    Disposition  Social Determinants of Health that impact treatment or disposition: Dementia  If patient survives the next 24 to 48 hours, will consider transfer back to skilled facility.      The patient was evaluated during the global COVID-19 pandemic, and the diagnosis was suspected/considered upon their initial presentation.  Evaluation, treatment, and testing were consistent with current guidelines for patients who present with complaints or symptoms that may be related to COVID-19.    I confirmed that the patient's Advance Care Plan is present, code status is documented, or surrogate decision maker is listed in the patient's medical record.        This document has been electronically signed by Daniele Pino MD on May 12, 2023 13:14 CDT

## 2023-05-12 NOTE — PLAN OF CARE
Goal Outcome Evaluation:  Plan of Care Reviewed With: spouse, family        Progress: no change  Outcome Evaluation: conitinuing comfort measures, family remains at BS

## 2023-05-12 NOTE — PLAN OF CARE
Goal Outcome Evaluation:  Plan of Care Reviewed With: patient, son        Progress: no change  Outcome Evaluation: Comfort measures continue. Photos of wounds not taken due to family request. Pain medication and Ativan adminstered due to pain and anxiety; good effects noted. Son remains at bedside. Continue current plan of care as progression continues.

## 2023-05-13 NOTE — PROGRESS NOTES
[facility] Medicine Services  INPATIENT PROGRESS NOTE    Length of Stay: 3  Date of Admission: 5/10/2023  Primary Care Physician: Radha Abraham DO    Subjective   Chief Complaint: Shortness of breath  HPI: Patient remains essentially unresponsive    As of today, 05/13/23  Review of Systems   Unable to perform ROS: Patient unresponsive        All pertinent negatives and positives are as above. All other systems have been reviewed and are negative unless otherwise stated.    Objective    Temp:  [97.5 °F (36.4 °C)] 97.5 °F (36.4 °C)  Heart Rate:  [83] 83  Resp:  [16] 16  BP: (111)/(59) 111/59    As of today, 05/13/23  Physical Exam  Vitals reviewed.   Constitutional:       Appearance: He is well-developed.   HENT:      Head: Normocephalic and atraumatic.   Eyes:      Pupils: Pupils are equal, round, and reactive to light.   Cardiovascular:      Rate and Rhythm: Normal rate and regular rhythm.      Heart sounds: Normal heart sounds. No murmur heard.    No friction rub. No gallop.   Pulmonary:      Effort: Pulmonary effort is normal. No respiratory distress.      Breath sounds: No wheezing or rales.      Comments: Coarse breath sounds  Chest:      Chest wall: No tenderness.   Abdominal:      General: Bowel sounds are normal. There is no distension.      Palpations: Abdomen is soft.      Tenderness: There is no abdominal tenderness.   Musculoskeletal:      Cervical back: Normal range of motion and neck supple.   Neurological:      Comments: Minimally responsive       Results Review:  I have reviewed the labs, radiology results, and diagnostic studies.    Laboratory Data:   Results from last 7 days   Lab Units 05/11/23  0427 05/10/23  1908   SODIUM mmol/L 157*  157* 164*   POTASSIUM mmol/L 3.9  3.9 3.9   CHLORIDE mmol/L 127*  127* 130*   CO2 mmol/L 18.0*  18.0* 19.0*   BUN mg/dL 101*  101* 117*   CREATININE mg/dL 2.02*  2.02* 2.38*   GLUCOSE mg/dL 96  96 287*    CALCIUM mg/dL 8.5*  8.5* 9.9   BILIRUBIN mg/dL 0.3 <0.2   ALK PHOS U/L 78 111   ALT (SGPT) U/L 33 44*   AST (SGOT) U/L 32 38   ANION GAP mmol/L 12.0  12.0 15.0     Estimated Creatinine Clearance: 20.4 mL/min (A) (by C-G formula based on SCr of 2.02 mg/dL (H)).  Results from last 7 days   Lab Units 05/11/23  0427 05/10/23  1921   MAGNESIUM mg/dL 2.3 2.9*   PHOSPHORUS mg/dL 3.1  --          Results from last 7 days   Lab Units 05/11/23  0427 05/10/23  1908   WBC 10*3/mm3 15.33* 12.11*   HEMOGLOBIN g/dL 9.5* 12.3*   HEMATOCRIT % 33.2* 43.8   PLATELETS 10*3/mm3 183 240     Results from last 7 days   Lab Units 05/11/23 0423   INR  1.61*       Culture Data:   Blood Culture   Date Value Ref Range Status   05/10/2023 No growth at 2 days  Preliminary   05/10/2023 No growth at 2 days  Preliminary     Urine Culture   Date Value Ref Range Status   05/10/2023 >100,000 CFU/mL Mixed Leann Isolated  Final     No results found for: RESPCX  No results found for: WOUNDCX  No results found for: STOOLCX  No components found for: BODYFLD    Radiology Data:   Imaging Results (Last 24 Hours)     ** No results found for the last 24 hours. **          I have utilized all available immediate resources to obtain, update, or review the patient's current medications (including all prescriptions, over-the-counter products, herbals, cannabis/cannabidiol products, and vitamin/mineral/dietary (nutritional) supplements).       Assessment/Plan     Active Hospital Problems    Diagnosis    • **Sepsis with acute renal failure without septic shock, due to unspecified organism, unspecified acute renal failure type    • Severe Malnutrition (HCC)        Plan:  1.  Sepsis with septic shock  2.  Severe hypernatremia  3.  Acute renal failure  4.  Metabolic acidosis  5.  Severe protein calorie malnutrition  6.  Decubitus ulcers present on admission    Patient appears comfortable.  Continue comfort measures.    Medical Decision Making  Number and Complexity  of problems: Multiple highly complex medical problems.    Conditions and Status:        Condition is worsening.     [facility] Data  External documents reviewed: Outpatient notes  My EKG interpretation: Sinus tachycardia, left axis deviation, left bundle branch block  My plain film interpretation: Elevated right hemidiaphragm, no acute cardiopulmonary disease     Discussed with: Family     Treatment Plan  As above    Care Planning  Shared decision making: Family agreement with plan of care  Code status and discussions: Comfort measures    Disposition  Social Determinants of Health that impact treatment or disposition: Dementia  If patient survives the next 24 to 48 hours, will consider transfer back to skilled facility.      The patient was evaluated during the global COVID-19 pandemic, and the diagnosis was suspected/considered upon their initial presentation.  Evaluation, treatment, and testing were consistent with current guidelines for patients who present with complaints or symptoms that may be related to COVID-19.    I confirmed that the patient's Advance Care Plan is present, code status is documented, or surrogate decision maker is listed in the patient's medical record.        This document has been electronically signed by Antonette Ford MD on May 13, 2023 17:03 CDT

## 2023-05-13 NOTE — NURSING NOTE
Continuing comfort care, at his time meds are adequately controlling discomfort/pain, family at bedside.

## 2023-05-13 NOTE — PLAN OF CARE
Goal Outcome Evaluation:  Plan of Care Reviewed With: spouse        Progress: no change  Outcome Evaluation: conitinuing comfort measures, family remains at BS       Comfort care continues, family at BS

## 2023-05-13 NOTE — NURSING NOTE
"Family at BS, have requested thorughout shift that staff not turn or \"disturb\" pt.  \" we want him as comfortable as possible and not get him upset\"   "

## 2023-05-14 NOTE — PLAN OF CARE
Goal Outcome Evaluation:  Plan of Care Reviewed With: spouse, family        Progress: no change  Outcome Evaluation: conitinuing comfort measures, family remains at BS       Continuing comfort care, family at BS

## 2023-05-14 NOTE — NURSING NOTE
Significant other at bedside. Pain/discomfort controlled, most agitation evident with repositions. Bm this shift.

## 2023-05-15 NOTE — PROGRESS NOTES
Psychiatric Medicine Services  INPATIENT PROGRESS NOTE    Length of Stay: 4  Date of Admission: 5/10/2023  Primary Care Physician: Radha Abraham DO    Subjective   Chief Complaint: Shortness of breath  HPI: Patient remains essentially unresponsive    As of today, 05/14/23  Review of Systems   Unable to perform ROS: Patient unresponsive        All pertinent negatives and positives are as above. All other systems have been reviewed and are negative unless otherwise stated.    Objective    Temp:  [100.4 °F (38 °C)] 100.4 °F (38 °C)  Heart Rate:  [111] 111  BP: (85)/(47) 85/47    As of today, 05/14/23  Physical Exam  Vitals reviewed.   Constitutional:       Appearance: He is well-developed.   HENT:      Head: Normocephalic and atraumatic.   Eyes:      Pupils: Pupils are equal, round, and reactive to light.   Cardiovascular:      Rate and Rhythm: Normal rate and regular rhythm.      Heart sounds: Normal heart sounds. No murmur heard.    No friction rub. No gallop.   Pulmonary:      Effort: Pulmonary effort is normal. No respiratory distress.      Breath sounds: No wheezing or rales.      Comments: Coarse breath sounds  Chest:      Chest wall: No tenderness.   Abdominal:      General: Bowel sounds are normal. There is no distension.      Palpations: Abdomen is soft.      Tenderness: There is no abdominal tenderness.   Musculoskeletal:      Cervical back: Normal range of motion and neck supple.   Neurological:      Comments: Minimally responsive       Results Review:  I have reviewed the labs, radiology results, and diagnostic studies.    Laboratory Data:   Results from last 7 days   Lab Units 05/11/23  0427 05/10/23  1908   SODIUM mmol/L 157*  157* 164*   POTASSIUM mmol/L 3.9  3.9 3.9   CHLORIDE mmol/L 127*  127* 130*   CO2 mmol/L 18.0*  18.0* 19.0*   BUN mg/dL 101*  101* 117*   CREATININE mg/dL 2.02*  2.02* 2.38*   GLUCOSE mg/dL 96  96 287*   CALCIUM mg/dL 8.5*   8.5* 9.9   BILIRUBIN mg/dL 0.3 <0.2   ALK PHOS U/L 78 111   ALT (SGPT) U/L 33 44*   AST (SGOT) U/L 32 38   ANION GAP mmol/L 12.0  12.0 15.0     Estimated Creatinine Clearance: 20.4 mL/min (A) (by C-G formula based on SCr of 2.02 mg/dL (H)).  Results from last 7 days   Lab Units 05/11/23  0427 05/10/23  1921   MAGNESIUM mg/dL 2.3 2.9*   PHOSPHORUS mg/dL 3.1  --          Results from last 7 days   Lab Units 05/11/23  0427 05/10/23  1908   WBC 10*3/mm3 15.33* 12.11*   HEMOGLOBIN g/dL 9.5* 12.3*   HEMATOCRIT % 33.2* 43.8   PLATELETS 10*3/mm3 183 240     Results from last 7 days   Lab Units 05/11/23  0423   INR  1.61*       Culture Data:   No results found for: BLOODCX  No results found for: URINECX  No results found for: RESPCX  No results found for: WOUNDCX  No results found for: STOOLCX  No components found for: BODYFLD    Radiology Data:   Imaging Results (Last 24 Hours)     ** No results found for the last 24 hours. **          I have utilized all available immediate resources to obtain, update, or review the patient's current medications (including all prescriptions, over-the-counter products, herbals, cannabis/cannabidiol products, and vitamin/mineral/dietary (nutritional) supplements).       Assessment/Plan     Active Hospital Problems    Diagnosis    • **Sepsis with acute renal failure without septic shock, due to unspecified organism, unspecified acute renal failure type    • Severe Malnutrition (HCC)        Plan:  1.  Sepsis with septic shock  2.  Severe hypernatremia  3.  Acute renal failure  4.  Metabolic acidosis  5.  Severe protein calorie malnutrition  6.  Decubitus ulcers present on admission    Patient appears comfortable.  Continue comfort measures.    Medical Decision Making  Number and Complexity of problems: Multiple highly complex medical problems.    Conditions and Status:        Condition is worsening.     MDM Data  External documents reviewed: Outpatient notes  My EKG interpretation: Sinus  tachycardia, left axis deviation, left bundle branch block  My plain film interpretation: Elevated right hemidiaphragm, no acute cardiopulmonary disease     Discussed with: Family     Treatment Plan  As above    Care Planning  Shared decision making: Family agreement with plan of care  Code status and discussions: Comfort measures    Disposition  Social Determinants of Health that impact treatment or disposition: Dementia  If patient survives the next 24 to 48 hours, will consider transfer back to skilled facility.      The patient was evaluated during the global COVID-19 pandemic, and the diagnosis was suspected/considered upon their initial presentation.  Evaluation, treatment, and testing were consistent with current guidelines for patients who present with complaints or symptoms that may be related to COVID-19.    I confirmed that the patient's Advance Care Plan is present, code status is documented, or surrogate decision maker is listed in the patient's medical record.        This document has been electronically signed by Antonette Ford MD on May 14, 2023 19:08 CDT

## 2023-05-16 LAB — BACTERIA ISLT: NORMAL

## 2023-05-22 LAB
QT INTERVAL: 358 MS
QTC INTERVAL: 475 MS

## 2023-05-23 NOTE — PROGRESS NOTES
Enter Query Response Below      Query Response:          Clinically unable to Determine       If applicable, please update the problem list.

## 2023-05-23 NOTE — DISCHARGE SUMMARY
[facility] Medicine Services  DEATH SUMMARY       Date of Admission: 5/10/2023  Date of Death:  05/15/23  at approximately 3:50 AM.  Primary Care Physician: Radha Abraham DO    Presenting Problem/History of Present Illness:  Hypernatremia [E87.0]  Acute respiratory failure with hypoxia [J96.01]  Sepsis with acute renal failure without septic shock, due to unspecified organism, unspecified acute renal failure type [A41.9, R65.20, N17.9]  Comfort measures only status [Z51.5]     Final Death Diagnoses:  Active Hospital Problems    Diagnosis    • **Sepsis with acute renal failure without septic shock, due to unspecified organism, unspecified acute renal failure type    • Severe Malnutrition (HCC)        Consults:   Consults     No orders found from 4/11/2023 to 5/11/2023.          Procedures Performed:                 Pertinent Test Results:     Hospital Course:  87-year-old gentleman resident of a nursing home, severely debilitated, normal verbal with advanced dementia, CAD, myocardial infarction, dyslipidemia, depression, hip fracture, fall and pressure wounds who presented to the hospital with shortness of breath and was subsequently found to have severe sepsis with septic shock along with acute renal failure, severe metabolic acidosis.  He was subsequently treated with IV antibiotics but he continued to deteriorate, thus the family including patient's wife, son and granddaughter made a decision to proceed with comfort care measures.  He subsequently passed away peacefully on 5/15/2023 at 3:50 AM.          Antonette Ford MD    Time: <30 mins

## 2025-07-08 NOTE — ED NOTES
Nursing report ED to floor  Teofilo Brown  87 y.o.  male    HPI:   Chief Complaint   Patient presents with    Respiratory Distress       Admitting doctor:   Saeid Behroozi, MD    Consulting provider(s):  Consults       No orders found from 4/11/2023 to 5/11/2023.             Admitting diagnosis:   The primary encounter diagnosis was Sepsis with acute renal failure without septic shock, due to unspecified organism, unspecified acute renal failure type. Diagnoses of Acute respiratory failure with hypoxia and Hypernatremia were also pertinent to this visit.    Code status:   Current Code Status       Date Active Code Status Order ID Comments User Context       Prior            Allergies:   Aleve [naproxen], Cefuroxime, Corticosteroids, Morphine, and Tramadol    Intake and Output    Intake/Output Summary (Last 24 hours) at 5/10/2023 2226  Last data filed at 5/10/2023 2212  Gross per 24 hour   Intake 100 ml   Output --   Net 100 ml       Weight:       05/10/23  1918   Weight: 53.5 kg (118 lb)       Most recent vitals:   Vitals:    05/10/23 2115 05/10/23 2145 05/10/23 2150 05/10/23 2211   BP: 110/55 99/54  97/52   BP Location:    Left arm   Patient Position:    Lying   Pulse: 98 96  107   Resp:    24   Temp:   99.9 °F (37.7 °C)    TempSrc:   Rectal    SpO2: 96% 97%  98%   Weight:       Height:         Oxygen Therapy: 12L humidified high flow    Active LDAs/IV Access:   Lines, Drains & Airways       Active LDAs       Name Placement date Placement time Site Days    Peripheral IV 05/10/23 1920 Left Antecubital 05/10/23  1920  Antecubital  less than 1    Peripheral IV 05/10/23 1921 Left;Posterior Hand 05/10/23  1921  Hand  less than 1    Suprapubic Catheter Double-lumen 16 Fr. 02/20/23  1450  -- 79                    Labs (abnormal labs have a star):   Labs Reviewed   MRSA SCREEN, PCR - Abnormal; Notable for the following components:       Result Value    MRSA, PCR Positive (*)     All other components within normal  Please change pharmacy from [facility] to [facility]   limits    Narrative:     Performed by real-time polymerase chain reaction (qPCR).  The negative predictive value of this diagnostic test is high and should only be used to consider de-escalating anti-MRSA therapy. A positive result may indicate colonization with MRSA and must be correlated clinically.   COMPREHENSIVE METABOLIC PANEL - Abnormal; Notable for the following components:    Glucose 287 (*)      (*)     Creatinine 2.38 (*)     Sodium 164 (*)     Chloride 130 (*)     CO2 19.0 (*)     Albumin 2.9 (*)     ALT (SGPT) 44 (*)     BUN/Creatinine Ratio 49.2 (*)     eGFR 25.7 (*)     All other components within normal limits    Narrative:     GFR Normal >60  Chronic Kidney Disease <60  Kidney Failure <15    The GFR formula is only valid for adults with stable renal function between ages 18 and 70.   CBC WITH AUTO DIFFERENTIAL - Abnormal; Notable for the following components:    WBC 12.11 (*)     Hemoglobin 12.3 (*)     MCH 25.9 (*)     MCHC 28.1 (*)     RDW 19.2 (*)     RDW-SD 64.5 (*)     Lymphocyte % 18.2 (*)     Immature Grans % 0.8 (*)     Neutrophils, Absolute 9.01 (*)     Immature Grans, Absolute 0.10 (*)     All other components within normal limits   LACTIC ACID, PLASMA - Abnormal; Notable for the following components:    Lactate 2.7 (*)     All other components within normal limits   MAGNESIUM - Abnormal; Notable for the following components:    Magnesium 2.9 (*)     All other components within normal limits   PROCALCITONIN - Abnormal; Notable for the following components:    Procalcitonin 0.34 (*)     All other components within normal limits    Narrative:     As a Marker for Sepsis (Non-Neonates):    1. <0.5 ng/mL represents a low risk of severe sepsis and/or septic shock.  2. >2 ng/mL represents a high risk of severe sepsis and/or septic shock.    As a Marker for Lower Respiratory Tract Infections that require antibiotic therapy:    PCT on Admission    Antibiotic Therapy       6-12 Hrs  "later    >0.5                Strongly Recommended  >0.25 - <0.5        Recommended   0.1 - 0.25          Discouraged              Remeasure/reassess PCT  <0.1                Strongly Discouraged     Remeasure/reassess PCT    As 28 day mortality risk marker: \"Change in Procalcitonin Result\" (>80% or <=80%) if Day 0 (or Day 1) and Day 4 values are available. Refer to http://www.[email or web address]-pct-calculator.[email or web address]    Change in PCT <=80%  A decrease of PCT levels below or equal to 80% defines a positive change in PCT test result representing a higher risk for 28-day all-cause mortality of patients diagnosed with severe sepsis for septic shock.    Change in PCT >80%  A decrease of PCT levels of more than 80% defines a negative change in PCT result representing a lower risk for 28-day all-cause mortality of patients diagnosed with severe sepsis or septic shock.      POCT GLUCOSE FINGERSTICK - Abnormal; Notable for the following components:    Glucose 168 (*)     All other components within normal limits   POCT GLUCOSE FINGERSTICK - Abnormal; Notable for the following components:    Glucose 259 (*)     All other components within normal limits   COVID-19 AND FLU A/B, NP SWAB IN TRANSPORT MEDIA 8-12 HR TAT - Normal    Narrative:     Fact sheet for providers: https://www.fda.gov/media/817381/download    Fact sheet for patients: https://www.fda.gov/media/843763/download    Test performed by PCR.   BLOOD CULTURE   BLOOD CULTURE   LACTIC ACID, REFLEX   URINALYSIS W/ MICROSCOPIC IF INDICATED (NO CULTURE)   POCT GLUCOSE FINGERSTICK   POCT GLUCOSE FINGERSTICK   POCT GLUCOSE FINGERSTICK   POCT GLUCOSE FINGERSTICK   CBC AND DIFFERENTIAL    Narrative:     The following orders were created for panel order CBC & Differential.  Procedure                               Abnormality         Status                     ---------                               -----------         ------                     CBC Auto Differential[[id number]]        Abnormal "            Final result                 Please view results for these tests on the individual orders.   EXTRA TUBES    Narrative:     The following orders were created for panel order Extra Tubes.  Procedure                               Abnormality         Status                     ---------                               -----------         ------                     Gold Top - SST[[id number]]                                   Final result               Light Blue Top[[id number]]                                   Final result                 Please view results for these tests on the individual orders.   GOLD TOP - SST   LIGHT BLUE TOP   EXTRA TUBES    Narrative:     The following orders were created for panel order Extra Tubes.  Procedure                               Abnormality         Status                     ---------                               -----------         ------                     Lavender Top[[id number]]                                     Final result               Gold Top - SST[[id number]]                                   Final result               Green Top (Gel)[[id number]]                                  Final result               Light Blue Top[[id number]]                                   Final result                 Please view results for these tests on the individual orders.   LAVENDER TOP   GOLD TOP - SST   GREEN TOP   LIGHT BLUE TOP       Meds given in ED:   Medications   dextrose (GLUTOSE) oral gel 15 g (has no administration in time range)   dextrose (D50W) (25 g/50 mL) IV injection 25 g (has no administration in time range)   glucagon HCl (Diagnostic) injection 1 mg (has no administration in time range)   Insulin Aspart (novoLOG) injection 0-7 Units (0 Units Subcutaneous Not Given 5/10/23 2030)   Pharmacy to Dose LevoFLOXacin (LEVAQUIN) (has no administration in time range)   levoFLOXacin (LEVAQUIN) 500 mg/100 mL D5W (premix) (LEVAQUIN) 500 mg (0 mg Intravenous Stopped 5/10/23  2212)   sodium chloride 0.9 % bolus 802.5 mL (802.5 mL Intravenous New Bag 5/10/23 2206)   acetaminophen (TYLENOL) suppository 650 mg (650 mg Rectal Given 5/10/23 2017)   dextrose (D5W) 5 % infusion (250 mL/hr Intravenous Currently Infusing 5/10/23 2212)   insulin regular (humuLIN R,novoLIN R) injection 2 Units (2 Units Subcutaneous Given 5/10/23 2209)     Pharmacy to Dose LevoFLOXacin (LEVAQUIN),          NIH Stroke Scale:       Isolation/Infection(s):  No active isolations   COVID (rule out), MRSA     COVID Testing  Collected yes  Resulted neg    Nursing report ED to floor:  Mental status: confused responds to pain   Ambulatory status: NA  Precautions: fall    ED nurse phone [phone]- [phone]

## (undated) DEVICE — DRSNG GZ CURAD XEROFORM NONADHS 5X9IN STRL

## (undated) DEVICE — STERILE POLYISOPRENE POWDER-FREE SURGICAL GLOVES: Brand: PROTEXIS

## (undated) DEVICE — PK CYSTO LF 60

## (undated) DEVICE — DRSNG SURESITE WNDW 4X4.5

## (undated) DEVICE — SPNG GZ WOVN 4X4IN 12PLY 10/BX STRL

## (undated) DEVICE — PROXIMATE SKIN STAPLERS (35 WIDE) CONTAINS 35 STAINLESS STEEL STAPLES (FIXED HEAD): Brand: PROXIMATE

## (undated) DEVICE — DRAPE,U/ SHT,SPLIT,PLAS,STERIL: Brand: MEDLINE

## (undated) DEVICE — CONTAINER,SPECIMEN,OR STERILE,4OZ: Brand: MEDLINE

## (undated) DEVICE — SAFESECURE,SECUREMENT,FOLEY CATH,STERILE: Brand: MEDLINE

## (undated) DEVICE — PAD GRND REM POLYHESIVE A/ DISP

## (undated) DEVICE — GLV SURG NEOLON 2G PF LF 6.5 STRL

## (undated) DEVICE — SOL PVPI SPRY BETADINE 3OZ

## (undated) DEVICE — SOL IRRG H2O PL/BG 1000ML STRL

## (undated) DEVICE — GUIDE WIRE, BALL-TIPPED, STERILE

## (undated) DEVICE — PK HIP LF 60

## (undated) DEVICE — 3M™ IOBAN™ 2 ANTIMICROBIAL INCISE DRAPE 6651EZ: Brand: IOBAN™ 2

## (undated) DEVICE — DRAINBAG,ANTI-REFLUX TOWER,L/F,2000ML,LL: Brand: MEDLINE

## (undated) DEVICE — SOL IRR NACL 0.9PCT 3000ML

## (undated) DEVICE — GLV SURG SENSICARE PI ORTHO SZ7.5 LF STRL

## (undated) DEVICE — STERILE POLYISOPRENE POWDER-FREE SURGICAL GLOVES WITH EMOLLIENT COATING: Brand: PROTEXIS

## (undated) DEVICE — SOL IRR NACL 0.9PCT BT 1000ML

## (undated) DEVICE — PENCL E/S HNDSWCH PUSHBTN HOLSTR 10FT

## (undated) DEVICE — GLV SURG SENSICARE LT W/ALOE PF LF 6 STRL

## (undated) DEVICE — SOL IRR H2O BTL 1000ML STRL

## (undated) DEVICE — K-WIRE
Type: IMPLANTABLE DEVICE | Site: HIP | Status: NON-FUNCTIONAL
Removed: 2022-12-02

## (undated) DEVICE — CATHETER,FOLEY,100%SILICONE,16FR,10ML,LF: Brand: MEDLINE

## (undated) DEVICE — SPNG DRN AMD EXCILON 6PLY 4X4IN PK/2

## (undated) DEVICE — TBG PENCL TELESCP MEGADYNE SMOKE EVAC 10FT

## (undated) DEVICE — INTENDED FOR TISSUE SEPARATION, AND OTHER PROCEDURES THAT REQUIRE A SHARP SURGICAL BLADE TO PUNCTURE OR CUT.: Brand: BARD-PARKER ® CARBON RIB-BACK BLADES

## (undated) DEVICE — CATHETER,FOLEY,COUDE,LATEX,18FR,10ML: Brand: MEDLINE

## (undated) DEVICE — INTRO CATH SUPRAPUB LAWRENCE 16FORNG

## (undated) DEVICE — GOWN,AURORA,NOREINF,RAGLAN,XL,STERILE: Brand: MEDLINE

## (undated) DEVICE — GAUZE,SPONGE,FLUFF,6"X6.75",STRL,5/TRAY: Brand: MEDLINE

## (undated) DEVICE — SUT MNCRYL 2/0 RB1 27IN UD Y266H

## (undated) DEVICE — SYR LL TP 10ML STRL

## (undated) DEVICE — DRILL, AO, STERILE